# Patient Record
Sex: MALE | Race: WHITE | NOT HISPANIC OR LATINO | Employment: FULL TIME | URBAN - METROPOLITAN AREA
[De-identification: names, ages, dates, MRNs, and addresses within clinical notes are randomized per-mention and may not be internally consistent; named-entity substitution may affect disease eponyms.]

---

## 2017-01-03 ENCOUNTER — APPOINTMENT (OUTPATIENT)
Dept: PHYSICAL THERAPY | Facility: CLINIC | Age: 67
End: 2017-01-03
Payer: COMMERCIAL

## 2017-01-03 PROCEDURE — 97140 MANUAL THERAPY 1/> REGIONS: CPT

## 2017-01-03 PROCEDURE — 97110 THERAPEUTIC EXERCISES: CPT

## 2017-01-10 ENCOUNTER — APPOINTMENT (OUTPATIENT)
Dept: PHYSICAL THERAPY | Facility: CLINIC | Age: 67
End: 2017-01-10
Payer: COMMERCIAL

## 2017-01-10 PROCEDURE — 97110 THERAPEUTIC EXERCISES: CPT

## 2017-01-10 PROCEDURE — 97140 MANUAL THERAPY 1/> REGIONS: CPT

## 2017-01-12 ENCOUNTER — APPOINTMENT (OUTPATIENT)
Dept: PHYSICAL THERAPY | Facility: CLINIC | Age: 67
End: 2017-01-12
Payer: COMMERCIAL

## 2017-01-12 PROCEDURE — 97140 MANUAL THERAPY 1/> REGIONS: CPT

## 2017-01-12 PROCEDURE — 97110 THERAPEUTIC EXERCISES: CPT

## 2017-01-17 ENCOUNTER — APPOINTMENT (OUTPATIENT)
Dept: PHYSICAL THERAPY | Facility: CLINIC | Age: 67
End: 2017-01-17
Payer: COMMERCIAL

## 2017-01-19 ENCOUNTER — APPOINTMENT (OUTPATIENT)
Dept: PHYSICAL THERAPY | Facility: CLINIC | Age: 67
End: 2017-01-19
Payer: COMMERCIAL

## 2017-01-19 PROCEDURE — 97110 THERAPEUTIC EXERCISES: CPT

## 2017-01-19 PROCEDURE — 97140 MANUAL THERAPY 1/> REGIONS: CPT

## 2017-01-24 ENCOUNTER — APPOINTMENT (OUTPATIENT)
Dept: PHYSICAL THERAPY | Facility: CLINIC | Age: 67
End: 2017-01-24
Payer: COMMERCIAL

## 2017-01-26 ENCOUNTER — APPOINTMENT (OUTPATIENT)
Dept: PHYSICAL THERAPY | Facility: CLINIC | Age: 67
End: 2017-01-26
Payer: COMMERCIAL

## 2017-01-31 ENCOUNTER — APPOINTMENT (OUTPATIENT)
Dept: PHYSICAL THERAPY | Facility: CLINIC | Age: 67
End: 2017-01-31
Payer: COMMERCIAL

## 2017-01-31 ENCOUNTER — GENERIC CONVERSION - ENCOUNTER (OUTPATIENT)
Dept: OTHER | Facility: OTHER | Age: 67
End: 2017-01-31

## 2017-01-31 PROCEDURE — 97110 THERAPEUTIC EXERCISES: CPT

## 2017-01-31 PROCEDURE — 97140 MANUAL THERAPY 1/> REGIONS: CPT

## 2017-02-09 ENCOUNTER — APPOINTMENT (OUTPATIENT)
Dept: PHYSICAL THERAPY | Facility: CLINIC | Age: 67
End: 2017-02-09
Payer: COMMERCIAL

## 2017-02-14 ENCOUNTER — APPOINTMENT (OUTPATIENT)
Dept: PHYSICAL THERAPY | Facility: CLINIC | Age: 67
End: 2017-02-14
Payer: COMMERCIAL

## 2017-02-16 ENCOUNTER — APPOINTMENT (OUTPATIENT)
Dept: PHYSICAL THERAPY | Facility: CLINIC | Age: 67
End: 2017-02-16
Payer: COMMERCIAL

## 2017-02-16 PROCEDURE — 97110 THERAPEUTIC EXERCISES: CPT

## 2017-02-16 PROCEDURE — 97140 MANUAL THERAPY 1/> REGIONS: CPT

## 2017-02-21 ENCOUNTER — APPOINTMENT (OUTPATIENT)
Dept: PHYSICAL THERAPY | Facility: CLINIC | Age: 67
End: 2017-02-21
Payer: COMMERCIAL

## 2017-02-23 ENCOUNTER — APPOINTMENT (OUTPATIENT)
Dept: PHYSICAL THERAPY | Facility: CLINIC | Age: 67
End: 2017-02-23
Payer: COMMERCIAL

## 2017-02-23 PROCEDURE — 97110 THERAPEUTIC EXERCISES: CPT

## 2017-02-23 PROCEDURE — 97140 MANUAL THERAPY 1/> REGIONS: CPT

## 2017-02-28 ENCOUNTER — APPOINTMENT (OUTPATIENT)
Dept: PHYSICAL THERAPY | Facility: CLINIC | Age: 67
End: 2017-02-28
Payer: COMMERCIAL

## 2017-02-28 PROCEDURE — 97110 THERAPEUTIC EXERCISES: CPT

## 2017-02-28 PROCEDURE — 97140 MANUAL THERAPY 1/> REGIONS: CPT

## 2017-03-23 ENCOUNTER — ALLSCRIPTS OFFICE VISIT (OUTPATIENT)
Dept: OTHER | Facility: OTHER | Age: 67
End: 2017-03-23

## 2017-03-23 DIAGNOSIS — M75.21 BICIPITAL TENDINITIS OF RIGHT SHOULDER: ICD-10-CM

## 2017-03-28 ENCOUNTER — TRANSCRIBE ORDERS (OUTPATIENT)
Dept: MRI IMAGING | Facility: CLINIC | Age: 67
End: 2017-03-28

## 2017-04-05 ENCOUNTER — HOSPITAL ENCOUNTER (OUTPATIENT)
Dept: MRI IMAGING | Facility: CLINIC | Age: 67
Discharge: HOME | End: 2017-04-05
Payer: COMMERCIAL

## 2017-04-05 DIAGNOSIS — M75.21 BICIPITAL TENDINITIS OF RIGHT SHOULDER: ICD-10-CM

## 2017-04-05 PROCEDURE — 73221 MRI JOINT UPR EXTREM W/O DYE: CPT

## 2017-04-20 ENCOUNTER — ALLSCRIPTS OFFICE VISIT (OUTPATIENT)
Dept: OTHER | Facility: OTHER | Age: 67
End: 2017-04-20

## 2017-04-27 ENCOUNTER — APPOINTMENT (OUTPATIENT)
Dept: PHYSICAL THERAPY | Facility: CLINIC | Age: 67
End: 2017-04-27
Payer: COMMERCIAL

## 2017-04-27 ENCOUNTER — GENERIC CONVERSION - ENCOUNTER (OUTPATIENT)
Dept: OTHER | Facility: OTHER | Age: 67
End: 2017-04-27

## 2017-04-27 PROCEDURE — 97161 PT EVAL LOW COMPLEX 20 MIN: CPT

## 2017-04-27 PROCEDURE — 97110 THERAPEUTIC EXERCISES: CPT

## 2017-05-04 ENCOUNTER — APPOINTMENT (OUTPATIENT)
Dept: PHYSICAL THERAPY | Facility: CLINIC | Age: 67
End: 2017-05-04
Payer: COMMERCIAL

## 2017-05-04 PROCEDURE — 97110 THERAPEUTIC EXERCISES: CPT

## 2017-05-04 PROCEDURE — 97140 MANUAL THERAPY 1/> REGIONS: CPT

## 2017-05-11 ENCOUNTER — APPOINTMENT (OUTPATIENT)
Dept: PHYSICAL THERAPY | Facility: CLINIC | Age: 67
End: 2017-05-11
Payer: COMMERCIAL

## 2017-05-18 ENCOUNTER — APPOINTMENT (OUTPATIENT)
Dept: PHYSICAL THERAPY | Facility: CLINIC | Age: 67
End: 2017-05-18
Payer: COMMERCIAL

## 2017-05-18 PROCEDURE — 97140 MANUAL THERAPY 1/> REGIONS: CPT

## 2017-05-18 PROCEDURE — 97110 THERAPEUTIC EXERCISES: CPT

## 2017-05-25 ENCOUNTER — APPOINTMENT (OUTPATIENT)
Dept: PHYSICAL THERAPY | Facility: CLINIC | Age: 67
End: 2017-05-25
Payer: COMMERCIAL

## 2017-07-10 ENCOUNTER — ALLSCRIPTS OFFICE VISIT (OUTPATIENT)
Dept: OTHER | Facility: OTHER | Age: 67
End: 2017-07-10

## 2017-10-23 ENCOUNTER — ALLSCRIPTS OFFICE VISIT (OUTPATIENT)
Dept: OTHER | Facility: OTHER | Age: 67
End: 2017-10-23
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
## 2018-01-12 VITALS
DIASTOLIC BLOOD PRESSURE: 90 MMHG | HEIGHT: 73 IN | SYSTOLIC BLOOD PRESSURE: 151 MMHG | HEART RATE: 72 BPM | BODY MASS INDEX: 24.93 KG/M2 | WEIGHT: 188.13 LBS

## 2018-01-12 VITALS
BODY MASS INDEX: 24.92 KG/M2 | WEIGHT: 188 LBS | SYSTOLIC BLOOD PRESSURE: 167 MMHG | DIASTOLIC BLOOD PRESSURE: 64 MMHG | HEIGHT: 73 IN | HEART RATE: 82 BPM

## 2018-01-13 VITALS
BODY MASS INDEX: 25.05 KG/M2 | HEIGHT: 73 IN | HEART RATE: 69 BPM | RESPIRATION RATE: 14 BRPM | DIASTOLIC BLOOD PRESSURE: 80 MMHG | OXYGEN SATURATION: 97 % | SYSTOLIC BLOOD PRESSURE: 124 MMHG | WEIGHT: 189 LBS

## 2018-01-14 VITALS
OXYGEN SATURATION: 98 % | HEIGHT: 73 IN | WEIGHT: 189.13 LBS | HEART RATE: 85 BPM | DIASTOLIC BLOOD PRESSURE: 82 MMHG | RESPIRATION RATE: 15 BRPM | SYSTOLIC BLOOD PRESSURE: 130 MMHG | BODY MASS INDEX: 25.07 KG/M2

## 2018-01-17 NOTE — RESULT NOTES
Verified Results  * MRI SHOULDER RIGHT WO CONTRAST [id number] 07:22PM Markie Holloway Order Number: [id number]    - Patient Instructions: To schedule this appointment, please contact Central Scheduling at [phone]   Order Number: [id number]    - Patient Instructions: To schedule this appointment, please contact Central Scheduling at [phone]  Test Name Result Flag Reference   MRI SHOULDER RIGHT WO CONTRAST (Report)     MRI RIGHT SHOULDER     INDICATION: Extreme right shoulder pain and decreased range of motion  Prior trauma  COMPARISON: None  TECHNIQUE:  The following MR sequences were obtained of the right shoulder: Localizer, axial GRE/PD fat sat, oblique coronal T2 fat sat, oblique sagittal T1/T2 fat sat  Images were acquired on a 1 5 Lurdes unit  Gadolinium was not used  FINDINGS:     SUBCUTANEOUS TISSUES: Normal     JOINT EFFUSION: Moderate effusion  ACROMION PROCESS: Broad-based subacromial spur evident  ROTATOR CUFF: Subscapularis, supraspinatus and infraspinatus tendinosis with a full-thickness complete supraspinatus tendon tear with retraction to the level of the acromion process measuring up to 2 4 cm  Slightly diminutive appearance of the    supraspinatus muscle without fatty muscular infiltration indicate chronicity  SUBACROMIAL/SUBDELTOID BURSA: Normal       LONG HEAD OF BICEPS TENDON: Proximal biceps tendinosis without tear  GLENOID LABRUM: Intact  GLENOHUMERAL JOINT: Intact  ACROMIOCLAVICULAR JOINT: Mild degenerative change noted  BONE MARROW SIGNAL: Normal        IMPRESSION:   1  Prominent subacromial spur with subscapularis, supraspinatus and infraspinatus tendinosis including a full-thickness complete supraspinatus tendon tear exhibiting tendon retraction to the level of the acromion process  No findings to indicate    chronicity at this time  2  Mild proximal biceps tendinosis without tear  3  Intact glenoid labrum  Workstation performed: [id number]     Signed by:   Candelario Correa MD   9/6/16       Plan  Chronic right shoulder pain    · 1 - Durga CHEN, Daniela Nicholas (Orthopedic Surgery) Physician Referral  Consult  Status: Active   Requested for: [id number]  Care Summary provided  : Yes

## 2018-02-05 ENCOUNTER — TELEPHONE (OUTPATIENT)
Dept: FAMILY MEDICINE CLINIC | Facility: CLINIC | Age: 68
End: 2018-02-05

## 2018-02-05 ENCOUNTER — HOSPITAL ENCOUNTER (OUTPATIENT)
Dept: RADIOLOGY | Facility: HOSPITAL | Age: 68
Discharge: HOME | End: 2018-02-05
Attending: FAMILY MEDICINE
Payer: COMMERCIAL

## 2018-02-05 ENCOUNTER — OFFICE VISIT (OUTPATIENT)
Dept: FAMILY MEDICINE CLINIC | Facility: CLINIC | Age: 68
End: 2018-02-05
Payer: COMMERCIAL

## 2018-02-05 VITALS
SYSTOLIC BLOOD PRESSURE: 142 MMHG | HEART RATE: 77 BPM | OXYGEN SATURATION: 98 % | DIASTOLIC BLOOD PRESSURE: 78 MMHG | BODY MASS INDEX: 25.45 KG/M2 | WEIGHT: 192 LBS | HEIGHT: 73 IN

## 2018-02-05 DIAGNOSIS — S20.211A CONTUSION OF RIGHT CHEST WALL, INITIAL ENCOUNTER: Primary | ICD-10-CM

## 2018-02-05 PROCEDURE — 3008F BODY MASS INDEX DOCD: CPT | Performed by: FAMILY MEDICINE

## 2018-02-05 PROCEDURE — 1160F RVW MEDS BY RX/DR IN RCRD: CPT | Performed by: FAMILY MEDICINE

## 2018-02-05 PROCEDURE — 71101 X-RAY EXAM UNILAT RIBS/CHEST: CPT

## 2018-02-05 PROCEDURE — 99213 OFFICE O/P EST LOW 20 MIN: CPT | Performed by: FAMILY MEDICINE

## 2018-02-05 RX ORDER — MELOXICAM 15 MG/1
1 TABLET ORAL DAILY
COMMUNITY
Start: 2016-09-14 | End: 2018-04-06 | Stop reason: SDUPTHER

## 2018-02-05 RX ORDER — PROMETHAZINE HYDROCHLORIDE 25 MG/1
25 TABLET ORAL EVERY 6 HOURS PRN
Qty: 30 TABLET | Refills: 0 | Status: SHIPPED | OUTPATIENT
Start: 2018-02-05 | End: 2019-01-03

## 2018-02-05 RX ORDER — OXYCODONE HYDROCHLORIDE AND ACETAMINOPHEN 5; 325 MG/1; MG/1
1-2 TABLET ORAL EVERY 4 HOURS PRN
Qty: 50 TABLET | Refills: 0 | Status: SHIPPED | OUTPATIENT
Start: 2018-02-05 | End: 2018-03-01 | Stop reason: SDUPTHER

## 2018-02-05 NOTE — PROGRESS NOTES
Assessment/Plan:           Problem List Items Addressed This Visit     Contusion of right chest wall - Primary    Relevant Medications    oxyCODONE-acetaminophen (PERCOCET) 5-325 mg per tablet    promethazine (PHENERGAN) 25 mg tablet    Other Relevant Orders    XR ribs 2 vw right            Subjective:      Patient ID: Jodene Severance  is a 78 y o  male  Patient comes in 2 days after falling in the snow with his  falling on top of him injuring his right chest         The following portions of the patient's history were reviewed and updated as appropriate: allergies, current medications, past family history, past medical history, past social history, past surgical history and problem list     Review of Systems   Constitutional: Negative  HENT: Negative  Respiratory: Positive for shortness of breath  Cardiovascular: Positive for chest pain  Objective:     Physical Exam   Constitutional: He is oriented to person, place, and time  He appears well-developed and well-nourished  HENT:   Head: Normocephalic and atraumatic  Eyes: EOM are normal  Pupils are equal, round, and reactive to light  Neck: Neck supple  Cardiovascular: Normal rate, regular rhythm and normal heart sounds  Tenderness right chest wall   Pulmonary/Chest: Effort normal and breath sounds normal  Tenderness:   Right chest wall  Neurological: He is oriented to person, place, and time  Psychiatric: He has a normal mood and affect  Nursing note and vitals reviewed

## 2018-02-06 ENCOUNTER — TELEPHONE (OUTPATIENT)
Dept: FAMILY MEDICINE CLINIC | Facility: CLINIC | Age: 68
End: 2018-02-06

## 2018-02-06 DIAGNOSIS — S20.211A CONTUSION OF RIGHT CHEST WALL, INITIAL ENCOUNTER: Primary | ICD-10-CM

## 2018-02-06 RX ORDER — IBUPROFEN 800 MG/1
800 TABLET ORAL EVERY 8 HOURS PRN
Qty: 90 TABLET | Refills: 0 | Status: SHIPPED | OUTPATIENT
Start: 2018-02-06 | End: 2018-03-19 | Stop reason: SDUPTHER

## 2018-02-09 ENCOUNTER — TELEPHONE (OUTPATIENT)
Dept: FAMILY MEDICINE CLINIC | Facility: CLINIC | Age: 68
End: 2018-02-09

## 2018-03-01 DIAGNOSIS — S20.211A CONTUSION OF RIGHT CHEST WALL, INITIAL ENCOUNTER: ICD-10-CM

## 2018-03-01 RX ORDER — OXYCODONE HYDROCHLORIDE AND ACETAMINOPHEN 5; 325 MG/1; MG/1
1-2 TABLET ORAL EVERY 4 HOURS PRN
Qty: 50 TABLET | Refills: 0 | Status: SHIPPED | OUTPATIENT
Start: 2018-03-01 | End: 2019-01-11

## 2018-03-19 DIAGNOSIS — S20.211A CONTUSION OF RIGHT CHEST WALL, INITIAL ENCOUNTER: ICD-10-CM

## 2018-03-19 RX ORDER — IBUPROFEN 800 MG/1
800 TABLET ORAL EVERY 8 HOURS PRN
Qty: 90 TABLET | Refills: 0 | Status: SHIPPED | OUTPATIENT
Start: 2018-03-19 | End: 2019-03-19 | Stop reason: ALTCHOICE

## 2018-04-06 DIAGNOSIS — M25.50 ARTHRALGIA, UNSPECIFIED JOINT: Primary | ICD-10-CM

## 2018-04-06 RX ORDER — MELOXICAM 15 MG/1
15 TABLET ORAL DAILY
Qty: 30 TABLET | Refills: 3 | Status: SHIPPED | OUTPATIENT
Start: 2018-04-06 | End: 2018-08-08 | Stop reason: SDUPTHER

## 2018-04-16 ENCOUNTER — TELEPHONE (OUTPATIENT)
Dept: FAMILY MEDICINE CLINIC | Facility: CLINIC | Age: 68
End: 2018-04-16

## 2018-04-16 DIAGNOSIS — G89.29 CHRONIC HIP PAIN, UNSPECIFIED LATERALITY: Primary | ICD-10-CM

## 2018-04-16 DIAGNOSIS — M25.559 CHRONIC HIP PAIN, UNSPECIFIED LATERALITY: Primary | ICD-10-CM

## 2018-04-16 RX ORDER — METHYLPREDNISOLONE 4 MG/1
TABLET ORAL
Qty: 21 TABLET | Refills: 0 | Status: SHIPPED | OUTPATIENT
Start: 2018-04-16 | End: 2019-01-03

## 2018-04-16 NOTE — TELEPHONE ENCOUNTER
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

## 2018-08-08 DIAGNOSIS — M25.50 ARTHRALGIA, UNSPECIFIED JOINT: ICD-10-CM

## 2018-08-09 RX ORDER — MELOXICAM 15 MG/1
15 TABLET ORAL DAILY
Qty: 30 TABLET | Refills: 3 | Status: SHIPPED | OUTPATIENT
Start: 2018-08-09 | End: 2018-10-23 | Stop reason: SDUPTHER

## 2018-10-23 DIAGNOSIS — M25.50 ARTHRALGIA, UNSPECIFIED JOINT: ICD-10-CM

## 2018-10-23 RX ORDER — MELOXICAM 15 MG/1
15 TABLET ORAL DAILY
Qty: 30 TABLET | Refills: 0 | Status: SHIPPED | OUTPATIENT
Start: 2018-10-23 | End: 2018-11-19 | Stop reason: SDUPTHER

## 2018-11-19 DIAGNOSIS — M25.50 ARTHRALGIA, UNSPECIFIED JOINT: ICD-10-CM

## 2018-11-19 RX ORDER — MELOXICAM 15 MG/1
15 TABLET ORAL DAILY
Qty: 90 TABLET | Refills: 0 | Status: SHIPPED | OUTPATIENT
Start: 2018-11-19 | End: 2019-02-21 | Stop reason: SDUPTHER

## 2019-01-03 ENCOUNTER — OFFICE VISIT (OUTPATIENT)
Dept: FAMILY MEDICINE CLINIC | Facility: CLINIC | Age: 69
End: 2019-01-03
Payer: COMMERCIAL

## 2019-01-03 VITALS
WEIGHT: 184 LBS | DIASTOLIC BLOOD PRESSURE: 70 MMHG | HEART RATE: 71 BPM | SYSTOLIC BLOOD PRESSURE: 136 MMHG | HEIGHT: 73 IN | BODY MASS INDEX: 24.39 KG/M2 | TEMPERATURE: 98.3 F | OXYGEN SATURATION: 94 % | RESPIRATION RATE: 17 BRPM

## 2019-01-03 DIAGNOSIS — L98.9 SKIN LESION: Primary | ICD-10-CM

## 2019-01-03 PROCEDURE — 99213 OFFICE O/P EST LOW 20 MIN: CPT | Performed by: NURSE PRACTITIONER

## 2019-01-03 RX ORDER — CYCLOBENZAPRINE HCL 10 MG
10 TABLET ORAL
Refills: 1 | COMMUNITY
Start: 2018-12-31 | End: 2019-03-19 | Stop reason: ALTCHOICE

## 2019-01-03 NOTE — ASSESSMENT & PLAN NOTE
Provided referral to dermatologist   Appointment made with [facility] for January 8th  Patient made aware of appointment time and location

## 2019-01-03 NOTE — PROGRESS NOTES
Assessment/Plan:    Skin lesion  Provided referral to dermatologist   Appointment made with [facility] for January 8th  Patient made aware of appointment time and location  Diagnoses and all orders for this visit:    Skin lesion  -     Ambulatory referral to Dermatology; Future    Other orders  -     cyclobenzaprine (FLEXERIL) 10 mg tablet; Take 10 mg by mouth daily at bedtime          Subjective:      Patient ID: Lor Araujo  is a 80 y o  male  [facility] reporting a nonhealing lesion on his nose for the past 2 months  He reports it initially was pearly in appearance  He applied Neosporin and tried to pop it  Since this time, there has been a scab formation which will fall off in bleed, but never fully heal   Denies family history of skin cancer or personal history of skin cancer  The following portions of the patient's history were reviewed and updated as appropriate: He   Patient Active Problem List    Diagnosis Date Noted    Skin lesion 01/03/2019    Contusion of right chest wall 02/05/2018     Current Outpatient Prescriptions   Medication Sig Dispense Refill    cyclobenzaprine (FLEXERIL) 10 mg tablet Take 10 mg by mouth daily at bedtime  1    ibuprofen (MOTRIN) 800 mg tablet TAKE 1 TABLET (800 MG TOTAL) BY MOUTH EVERY 8 (EIGHT) HOURS AS NEEDED FOR MILD PAIN OR MODERATE PAIN 90 tablet 0    meloxicam (MOBIC) 15 mg tablet Take 1 tablet (15 mg total) by mouth daily 90 tablet 0    oxyCODONE-acetaminophen (PERCOCET) 5-325 mg per tablet Take 1-2 tablets by mouth every 4 (four) hours as needed for moderate pain Earliest Fill Date: 3/1/18 Max Daily Amount: 12 tablets 50 tablet 0     No current facility-administered medications for this visit  He is allergic to tetracyclines & related and demerol [meperidine]       Review of Systems   Constitutional: Negative  Respiratory: Negative  Cardiovascular: Negative      Skin:        Skin lesion   Psychiatric/Behavioral: Negative  /70   Pulse 71   Temp 98 3 °F (36 8 °C)   Resp 17   Ht 6' 1" (1 854 m)   Wt 83 5 kg (184 lb)   SpO2 94%   BMI 24 28 kg/m²     Objective:     Physical Exam   Constitutional: He is oriented to person, place, and time  He appears well-developed and well-nourished  HENT:   Head: Normocephalic and atraumatic  Eyes: Conjunctivae are normal    Neck: Neck supple  Cardiovascular: Normal rate, regular rhythm and normal heart sounds  No murmur heard  Pulmonary/Chest: Effort normal and breath sounds normal  No respiratory distress  He has no wheezes  He has no rales  He exhibits no tenderness  Neurological: He is alert and oriented to person, place, and time  Skin:   Presence of a 4 mm rounded lesion with a pearly base located on the tip of nose  Presence of scabbing and dried blood surrounding lesion  Psychiatric: He has a normal mood and affect  His behavior is normal  Judgment and thought content normal    Nursing note and vitals reviewed

## 2019-01-11 VITALS
DIASTOLIC BLOOD PRESSURE: 76 MMHG | SYSTOLIC BLOOD PRESSURE: 165 MMHG | HEIGHT: 73 IN | WEIGHT: 176 LBS | HEART RATE: 94 BPM | BODY MASS INDEX: 23.33 KG/M2

## 2019-01-11 DIAGNOSIS — M75.121 COMPLETE TEAR OF RIGHT ROTATOR CUFF: Primary | ICD-10-CM

## 2019-01-11 PROCEDURE — 99214 OFFICE O/P EST MOD 30 MIN: CPT | Performed by: ORTHOPAEDIC SURGERY

## 2019-01-11 NOTE — PROGRESS NOTES
Patient Name:  Shamika Rae  MRN:  [id number]    Assessment     1  Complete tear of right rotator cuff  Ambulatory referral to Physical Therapy       Plan     1  Reviewed treatment options, including arthroscopic rotator cuff repair  Patient wants to try a course of nonsurgical treatment, which worked well for him in the past   We discussed the potential that his tear is irreparable based on the degree of retraction and chronicity  Wants to reserve surgery as a last resort  2  Unable to work until next evaluation  3  Referred to physical therapy  4  NSAIDs as needed  Avoid opiate medication  Return in about 1 month (around 2/11/2019)  Chief Complaint     Right shoulder pain      History of the Present Illness     Elenita Gonzales Sr  is a 80 y o  male with right shoulder pain after an injury at work on 12/31/18  He states that he slipped and fell onto his right shoulder  He had significant pain and weakness after the injury with inability to actively raise his arm away from his body  He reports moderate improvement since the injury occurred but continues to have pain and weakness with reaching overhead localized to the anterolateral aspect of his shoulder  He initially had pain in the trapezius and neck region that has since resolved  He reports previous problem with his right shoulder approximately 2 years ago treated with physical therapy and 2 steroid injections  MRI in April 2017 showed a rotator cuff tear  A more recent MRI after his work injury also showed this rotator cuff tear  Physical Exam     /76   Pulse 94   Ht 6' 1" (1 854 m)   Wt 79 8 kg (176 lb)   BMI 23 22 kg/m²     Right shoulder:  Nontender to palpation  No gross deformity  Full range of motion  Impingement signs are positive  Empty can test is positive  Speed's test is positive  Cross-body adduction test is negative  4/5 strength forward flexion  Eyes:  Anicteric sclerae  Neck:  Supple    Lungs: Unlabored breathing  Cardiovascular:  Capillary refill is less than 2 seconds  Skin:  Intact without erythema  Neurologic:  Sensation intact to light touch  Psychiatric:  Mood and affect are appropriate  Data Review     I have personally reviewed pertinent films in PACS, and my interpretation follows:    MRI right shoulder 1/4/19:  Complete tear of the supraspinatus tendon with retraction to the humeral head apex unchanged from 4/5/17  Radiology report describes moderate supraspinatus muscle atrophy, which is less apparent on my read  Moderate acromioclavicular joint degenerative changes  Mild glenohumeral joint degenerative changes  History reviewed  No pertinent past medical history  Past Surgical History:   Procedure Laterality Date    WRIST SURGERY         Allergies   Allergen Reactions    Tetracyclines & Related     Demerol [Meperidine] Vomiting       Current Outpatient Prescriptions on File Prior to Visit   Medication Sig Dispense Refill    cyclobenzaprine (FLEXERIL) 10 mg tablet Take 10 mg by mouth daily at bedtime  1    ibuprofen (MOTRIN) 800 mg tablet TAKE 1 TABLET (800 MG TOTAL) BY MOUTH EVERY 8 (EIGHT) HOURS AS NEEDED FOR MILD PAIN OR MODERATE PAIN 90 tablet 0    meloxicam (MOBIC) 15 mg tablet Take 1 tablet (15 mg total) by mouth daily 90 tablet 0    [DISCONTINUED] oxyCODONE-acetaminophen (PERCOCET) 5-325 mg per tablet Take 1-2 tablets by mouth every 4 (four) hours as needed for moderate pain Earliest Fill Date: 3/1/18 Max Daily Amount: 12 tablets 50 tablet 0     No current facility-administered medications on file prior to visit  Social History   Substance Use Topics    Smoking status: Never Smoker    Smokeless tobacco: Never Used    Alcohol use Not on file      Comment: special occasions       Family History   Problem Relation Age of Onset    Diabetes Mother     Cancer Other          Review of Systems     As stated in the HPI   All other systems were reviewed and are negative

## 2019-01-11 NOTE — LETTER
January 11, 2019     Patient: Gemma Moore Sr  YOB: 1950   Date of Visit: 1/11/2019       To Whom it May Concern:    Urban Castellon is under my professional care  He was seen in my office on 1/11/2019  He is unable to work until the next office evaluation in 1 month  If you have any questions or concerns, please don't hesitate to call           Sincerely,          Bernabe Ramirez MD        CC: No Recipients

## 2019-01-16 ENCOUNTER — EVALUATION (OUTPATIENT)
Dept: PHYSICAL THERAPY | Facility: CLINIC | Age: 69
End: 2019-01-16
Payer: OTHER MISCELLANEOUS

## 2019-01-16 DIAGNOSIS — M75.121 COMPLETE TEAR OF RIGHT ROTATOR CUFF: ICD-10-CM

## 2019-01-16 PROCEDURE — 97161 PT EVAL LOW COMPLEX 20 MIN: CPT | Performed by: PHYSICAL THERAPIST

## 2019-01-16 PROCEDURE — 97110 THERAPEUTIC EXERCISES: CPT | Performed by: PHYSICAL THERAPIST

## 2019-01-16 NOTE — PROGRESS NOTES
PT Evaluation     Today's date: 2019  Patient name: Morgan Ge  : 1950  MRN: [id number]  Referring provider: Lottie Seymour MD  Dx:   Encounter Diagnosis     ICD-10-CM    1  Complete tear of right rotator cuff M75 121 Ambulatory referral to Physical Therapy                  Assessment  Assessment details: Ana Maria Oconnor Sr  is a 80 y o  male who presents with pain, decreased strength, and decreased ROM  Due to these impairments, patient has difficulty performing a/iadls, recreational activities, and is currently unable to work  Patient's clinical presentation is consistent with their referring diagnosis of right RTC tear  Patient would benefit from skilled physical therapy to address their aforementioned impairments, improve their level of function and to improve their overall quality of life  Impairments: abnormal or restricted ROM, impaired physical strength and pain with function    Symptom irritability: moderateUnderstanding of Dx/Px/POC: good   Prognosis: good    Goals  Short term goals  to be achieved in 4 weeks:    Decrease pain 20-50%  Increase strength by 1/2 grade  Improve range of motion by 25%  Long term goals  to be achieved by discharge:    Overhead reaching is improved to maximal level of function  Lifting is improved to maximal level of function  IADL performance in related activities is improved to maximal level of function  Performance in related work activities is improved to maximal level of function       Plan  Planned therapy interventions: manual therapy, neuromuscular re-education, patient education, strengthening, stretching, therapeutic exercise and home exercise program  Frequency: 3x week  Duration in visits: 18  Duration in weeks: 6  Plan of Care beginning date: 2019  Plan of Care expiration date: 2019  Treatment plan discussed with: patient        Subjective Evaluation    History of Present Illness  Date of onset: 2018  Mechanism of injury: Patient refers to PT with c/o pain in his right shoulder which began on 18 when he slipped on ice and fell outside of his truck while working  Patient sustained initial right RTC from [address] in 2017  Patient received an MRI of his right shoulder in 2017 which revealed complete tear of the Supraspinatus with torn tendon edge retracted to the level of the Humeral head apex and moderate AC joint OA  Patient states he received an MRI of his right shoulder on 19; patient unsure of specifics of test; report not available at this time  Patient works full time as a ; is currently out of work secondary to right shoulder injury  Patient states pain is decreasing and ROM is improving since the injury on   Pain  Current pain ratin  At best pain ratin  At worst pain ratin  Quality: sharp and dull ache  Relieving factors: heat and medications (BioFreeze)  Exacerbated by: Washing, dressing, reaching    Progression: improved          Objective     Active Range of Motion     Right Shoulder   Flexion: 90 degrees   Abduction: 90 degrees   External rotation BTH: T1   Internal rotation BTB: T11     Passive Range of Motion     Right Shoulder   Flexion: 150 degrees   Abduction: 145 degrees   External rotation 45°: 65 degrees   Internal rotation 45°: 75 degrees     Strength/Myotome Testing     Right Shoulder     Planes of Motion   Flexion: 3- (pain)   Abduction: 3- (pain)   External rotation at 0°: 4- (pain)   Internal rotation at 0°: 4+     Isolated Muscles   Lower trapezius: 4- (pain)   Middle trapezius: 4- (pain)       Flowsheet Rows      Most Recent Value   PT/OT G-Codes   Current Score  45   Projected Score  62   FOTO information reviewed  Yes   Assessment Type  Evaluation          Precautions: Right RTC tear, Right hip OA    Daily Treatment Diary     Manual              Right shoulder PROM Exercise Diary  1/16            Pulleys             TB ER             TB IR             TB shoulder ext             TB rows             Sup wand flex             Sup wand ER             Sidelying ER             Prone T's             Prone I's             Prone Y's                                                                                                                                      Modalities              CP prn

## 2019-01-18 ENCOUNTER — OFFICE VISIT (OUTPATIENT)
Dept: PHYSICAL THERAPY | Facility: CLINIC | Age: 69
End: 2019-01-18
Payer: OTHER MISCELLANEOUS

## 2019-01-18 DIAGNOSIS — M75.121 COMPLETE TEAR OF RIGHT ROTATOR CUFF: Primary | ICD-10-CM

## 2019-01-18 PROCEDURE — 97140 MANUAL THERAPY 1/> REGIONS: CPT | Performed by: PHYSICAL THERAPIST

## 2019-01-18 PROCEDURE — 97110 THERAPEUTIC EXERCISES: CPT | Performed by: PHYSICAL THERAPIST

## 2019-01-18 NOTE — PROGRESS NOTES
Daily Note      Today's date: 2019  Patient name: Delmi Royal  : 1950  MRN: [id number]  Referring provider: Isha Bueno MD  Dx:   Encounter Diagnosis     ICD-10-CM    1  Complete tear of right rotator cuff M75 121                   Subjective: Patient stated moderate difficulty with HEP activities  Objective: See treatment diary below  Precautions: Right RTC tear, Right hip OA     Daily Treatment Diary      Manual                     Right shoulder PROM    KK                                                                                                                         Exercise Diary                     Pulleys    5 min                   TB ER    GTB 25x                   TB IR    GTB                    TB shoulder ext    nv                   TB rows    GTB 30x                   Sup wand flex   10x10"                   Sup wand ER   10x10"                   Sidelying ER    2x10                   Prone T's                       Prone I's                       Prone Y's                                                                                                                                                                                                                                                     Modalities                        CP prn                                                                               Assessment: Patient demonstrated moderate difficulty with supine wand flex and sidelying shoulder ER; moderate pain with manual shoulder PROM  Plan: Continue per plan of care

## 2019-01-21 ENCOUNTER — OFFICE VISIT (OUTPATIENT)
Dept: PHYSICAL THERAPY | Facility: CLINIC | Age: 69
End: 2019-01-21
Payer: OTHER MISCELLANEOUS

## 2019-01-21 DIAGNOSIS — M75.121 COMPLETE TEAR OF RIGHT ROTATOR CUFF: Primary | ICD-10-CM

## 2019-01-21 PROCEDURE — 97140 MANUAL THERAPY 1/> REGIONS: CPT

## 2019-01-21 PROCEDURE — 97110 THERAPEUTIC EXERCISES: CPT

## 2019-01-21 PROCEDURE — 97112 NEUROMUSCULAR REEDUCATION: CPT

## 2019-01-21 NOTE — PROGRESS NOTES
Daily Note     Today's date: 2019  Patient name: Anna Banks  : 1950  MRN: [id number]  Referring provider: Author Clarisa MD  Dx:   Encounter Diagnosis     ICD-10-CM    1  Complete tear of right rotator cuff M75 121                   Subjective: Pt c/o 5/10 pain in R shoulder today  He states that his pain varies between 4-8  Objective: See treatment diary below  Precautions: Right RTC tear, Right hip OA     Daily Treatment Diary      Manual                   Right shoulder PROM    KK  SA                                                                                                                       Exercise Diary                   Pulleys    5 min  5'                 TB ER    GTB 25x  GTB 25x                 TB IR    GTB   GTB 25x                 TB shoulder ext    nv  GTB 30x                 TB rows    GTB 30x  GTB 30x                 Sup wand flex   10x10"  10" 10x                 Sup wand ER   10x10"  10" 10x                 Sidelying ER    2x10  2x10                 Prone T's      10x                 Prone I's      10x                 Prone Y's      10x                                                                                                                                                                                                                                               Modalities                        CP prn  10'                                                                           Assessment: Pt had little to no pain post treatment today despite noting some ms soreness  He was educated on DOMS due to increase in intensity  He noted that the ice helps to decrease his soreness  He was advised to continue with HEP including ROM to decrease tension in R shoulder  Plan: Progress treatment as tolerated

## 2019-01-23 ENCOUNTER — OFFICE VISIT (OUTPATIENT)
Dept: PHYSICAL THERAPY | Facility: CLINIC | Age: 69
End: 2019-01-23
Payer: OTHER MISCELLANEOUS

## 2019-01-23 DIAGNOSIS — M75.121 COMPLETE TEAR OF RIGHT ROTATOR CUFF: Primary | ICD-10-CM

## 2019-01-23 PROCEDURE — 97110 THERAPEUTIC EXERCISES: CPT

## 2019-01-23 PROCEDURE — 97140 MANUAL THERAPY 1/> REGIONS: CPT

## 2019-01-23 PROCEDURE — 97112 NEUROMUSCULAR REEDUCATION: CPT

## 2019-01-23 NOTE — PROGRESS NOTES
Daily Note     Today's date: 2019  Patient name: Julian Hopkins  : 1950  MRN: [id number]  Referring provider: Kimberli Flores MD  Dx:   Encounter Diagnosis     ICD-10-CM    1  Complete tear of right rotator cuff M75 121                   Subjective: Pt c/o 6/10 pain in his R shoulder today  He states that his pain comes and goes and the evening is the worst        Objective: See treatment diary below  Precautions: Right RTC tear, Right hip OA     Daily Treatment Diary      Manual                 Right shoulder PROM    KK  SA  SA                                                                                                                     Exercise Diary                 Pulleys    5 min  5'  5'               TB ER    GTB 25x  GTB 25x  GTB 25x               TB IR    GTB   GTB 25x  GTB 25x               TB shoulder ext    nv  GTB 30x  GTB 30x               TB rows    GTB 30x  GTB 30x  GTB 30x               Sup wand flex   10x10"  10" 10x  10" 10x               Sup wand ER   10x10"  10" 10x  10" 10x               Sidelying ER    2x10  2x10  2x10               Prone T's      10x  10x               Prone I's      10x  10x               Prone Y's      10x  10x                                                                                                                                                                                                                                             Modalities                      CP prn  10'  10'                                                                         Assessment: Pt had no pain post treatment today  He noted feeling better after manual  He was educated on HEP including prone Supriya to improve ROM and decrease pain  Plan: Progress treatment as tolerated

## 2019-01-25 ENCOUNTER — OFFICE VISIT (OUTPATIENT)
Dept: PHYSICAL THERAPY | Facility: CLINIC | Age: 69
End: 2019-01-25
Payer: OTHER MISCELLANEOUS

## 2019-01-25 DIAGNOSIS — M75.121 COMPLETE TEAR OF RIGHT ROTATOR CUFF: Primary | ICD-10-CM

## 2019-01-25 PROCEDURE — 97110 THERAPEUTIC EXERCISES: CPT | Performed by: PHYSICAL THERAPIST

## 2019-01-25 PROCEDURE — 97140 MANUAL THERAPY 1/> REGIONS: CPT | Performed by: PHYSICAL THERAPIST

## 2019-01-25 PROCEDURE — 97112 NEUROMUSCULAR REEDUCATION: CPT | Performed by: PHYSICAL THERAPIST

## 2019-01-25 PROCEDURE — 97010 HOT OR COLD PACKS THERAPY: CPT | Performed by: PHYSICAL THERAPIST

## 2019-01-25 NOTE — PROGRESS NOTES
Daily Note     Today's date: 2019  Patient name: Mary Sadler  : 1950  MRN: [id number]  Referring provider: Edmar Wynn MD  Dx:   Encounter Diagnosis     ICD-10-CM    1  Complete tear of right rotator cuff M75 121                   Subjective: Patient states he was not able to complete his HEP yesterday secondary to increased pain in his shoulder  Objective: See treatment diary below  Precautions: Right RTC tear, Right hip OA     Daily Treatment Diary      Manual               Right shoulder PROM    KK  SA  SA  KK                                                                                                                   Exercise Diary               Pulleys    5 min  5'  5'  5 min             TB ER    GTB 25x  GTB 25x  GTB 25x  GTB 30x             TB IR    GTB   GTB 25x  GTB 25x  GTB 30x             TB shoulder ext    nv  GTB 30x  GTB 30x  GTB 30x             TB rows    GTB 30x  GTB 30x  GTB 30x  GTB 30x             Sup wand flex   10x10"  10" 10x  10" 10x  10" 10x            Sup wand ER   10x10"  10" 10x  10" 10x  10" 10x             Sidelying ER    2x10  2x10  2x10  2x10             Prone T's      10x  10x  2x10             Prone I's      10x  10x  2x10             Prone Y's      10x  10x  2x10                                                                                                                                                                                                                                           Modalities                    CP prn  10'  10'  10'                                                                         Assessment: Patient demonstrated greatest difficulty with sidelying ER and supine wand flexion; minimal pain with manual shoulder PROM  Plan: Continue per plan of care

## 2019-01-28 ENCOUNTER — OFFICE VISIT (OUTPATIENT)
Dept: PHYSICAL THERAPY | Facility: CLINIC | Age: 69
End: 2019-01-28
Payer: OTHER MISCELLANEOUS

## 2019-01-28 DIAGNOSIS — M75.121 COMPLETE TEAR OF RIGHT ROTATOR CUFF: Primary | ICD-10-CM

## 2019-01-28 PROCEDURE — 97140 MANUAL THERAPY 1/> REGIONS: CPT | Performed by: PHYSICAL THERAPIST

## 2019-01-28 PROCEDURE — 97110 THERAPEUTIC EXERCISES: CPT | Performed by: PHYSICAL THERAPIST

## 2019-01-28 NOTE — PROGRESS NOTES
Daily Note     Today's date: 2019  Patient name: Francis Suarez  : 1950  MRN: [id number]  Referring provider: Lynn Escalera MD  Dx:   Encounter Diagnosis     ICD-10-CM    1  Complete tear of right rotator cuff M75 121                   Subjective: Patient stated moderate stiffness in his shoulder prior to treatment session        Objective: See treatment diary below  Precautions: Right RTC tear, Right hip OA     Daily Treatment Diary      Manual             Right shoulder PROM    KK  SA  SA  KK  KK                                                                                                                 Exercise Diary             Pulleys    5 min  5'  5'  5 min  5 min           TB ER    GTB 25x  GTB 25x  GTB 25x  GTB 30x  GTB 30x           TB IR    GTB   GTB 25x  GTB 25x  GTB 30x  GTB 30x           TB shoulder ext    nv  GTB 30x  GTB 30x  GTB 30x  GTB 30x           TB rows    GTB 30x  GTB 30x  GTB 30x  GTB 30x  GTB 30x           Sup wand flex   10x10"  10" 10x  10" 10x  10" 10x 10" 10x           Sup wand ER   10x10"  10" 10x  10" 10x  10" 10x  10" 10x           Sidelying ER    2x10  2x10  2x10  2x10  3x10           Prone T's      10x  10x  2x10  3x10           Prone I's      10x  10x  2x10  3x10           Prone Y's      10x  10x  2x10  3x10           Stand shld flex            2x10                                                                                                                                                                                                                 Modalities                    CP prn  10'  10'  10'                                                                         Assessment: Patient required verbal cueing to avoid UT compensation during standing shoulder flexion; patient performed increased reps with exercises as listed without significant c/o pain; moderate pain with manual shoulder PROM  Plan: Continue per plan of care

## 2019-01-30 ENCOUNTER — APPOINTMENT (OUTPATIENT)
Dept: PHYSICAL THERAPY | Facility: CLINIC | Age: 69
End: 2019-01-30
Payer: OTHER MISCELLANEOUS

## 2019-02-01 ENCOUNTER — OFFICE VISIT (OUTPATIENT)
Dept: PHYSICAL THERAPY | Facility: CLINIC | Age: 69
End: 2019-02-01
Payer: OTHER MISCELLANEOUS

## 2019-02-01 DIAGNOSIS — M75.121 COMPLETE TEAR OF RIGHT ROTATOR CUFF: Primary | ICD-10-CM

## 2019-02-01 PROCEDURE — 97140 MANUAL THERAPY 1/> REGIONS: CPT | Performed by: PHYSICAL THERAPIST

## 2019-02-01 PROCEDURE — 97010 HOT OR COLD PACKS THERAPY: CPT | Performed by: PHYSICAL THERAPIST

## 2019-02-01 PROCEDURE — 97112 NEUROMUSCULAR REEDUCATION: CPT | Performed by: PHYSICAL THERAPIST

## 2019-02-01 PROCEDURE — 97110 THERAPEUTIC EXERCISES: CPT | Performed by: PHYSICAL THERAPIST

## 2019-02-01 NOTE — PROGRESS NOTES
Daily Note     Today's date: 2019  Patient name: Sukh Kearns  : 1950  MRN: [id number]  Referring provider: Antonio Steen MD  Dx:   Encounter Diagnosis     ICD-10-CM    1  Complete tear of right rotator cuff M75 121                   Subjective: Patient stated moderate stiffness in his shoulder prior to treatment session        Objective: See treatment diary below  Precautions: Right RTC tear, Right hip OA     Daily Treatment Diary      Manual           Right shoulder PROM    KK  SA  SA  KK  KK  KK                                                                                                               Exercise Diary           Pulleys    5 min  5'  5'  5 min  5 min  5 min         TB ER    GTB 25x  GTB 25x  GTB 25x  GTB 30x  GTB 30x  BTB 30x         TB IR    GTB   GTB 25x  GTB 25x  GTB 30x  GTB 30x  BTB 30x         TB shoulder ext    nv  GTB 30x  GTB 30x  GTB 30x  GTB 30x  BTB 30x         TB rows    GTB 30x  GTB 30x  GTB 30x  GTB 30x  GTB 30x  BTB 30x        Sup wand flex   10x10"  10" 10x  10" 10x  10" 10x 10" 10x  10" 10x          Sup wand ER   10x10"  10" 10x  10" 10x  10" 10x  10" 10x  10" 10x          Sidelying ER    2x10  2x10  2x10  2x10  3x10  2x10        Prone T's      10x  10x  2x10  3x10  3x10         Prone I's      10x  10x  2x10  3x10  3x10         Prone Y's      10x  10x  2x10  3x10  3x10         Stand shld flex            2x10  2x10                                                                                                                                                                                                               Modalities                    CP prn  10'  10'  10'                                                                         Assessment:  Patient demonstrated moderate difficulty with progression to BTB with TB ER exercise; patient continues to demonstrate moderate difficulty with sidelying ER; moderate pain with manual PROM  Plan: Continue per plan of care

## 2019-02-04 ENCOUNTER — EVALUATION (OUTPATIENT)
Dept: PHYSICAL THERAPY | Facility: CLINIC | Age: 69
End: 2019-02-04
Payer: OTHER MISCELLANEOUS

## 2019-02-04 DIAGNOSIS — M75.121 COMPLETE TEAR OF RIGHT ROTATOR CUFF: Primary | ICD-10-CM

## 2019-02-04 PROCEDURE — 97140 MANUAL THERAPY 1/> REGIONS: CPT | Performed by: PHYSICAL THERAPIST

## 2019-02-04 PROCEDURE — 97112 NEUROMUSCULAR REEDUCATION: CPT | Performed by: PHYSICAL THERAPIST

## 2019-02-04 PROCEDURE — 97010 HOT OR COLD PACKS THERAPY: CPT | Performed by: PHYSICAL THERAPIST

## 2019-02-04 PROCEDURE — 97110 THERAPEUTIC EXERCISES: CPT | Performed by: PHYSICAL THERAPIST

## 2019-02-04 NOTE — PROGRESS NOTES
PT Re-Evaluation     Today's date: 2019  Patient name: Merlene Chavarria  : 1950  MRN: [id number]  Referring provider: Vern Ramirez MD  Dx:   Encounter Diagnosis     ICD-10-CM    1  Complete tear of right rotator cuff M75 121                   Assessment  Assessment details: Patient demonstrates improved ROM, improved strength, and improved functional ability with his right UE since beginning PT treatment  Patient would benefit from continued skilled PT services to address remaining deficits and to facilitate return to prior level of function  Thank you for the referral of your patient  Impairments: abnormal or restricted ROM, impaired physical strength and pain with function  Understanding of Dx/Px/POC: good   Prognosis: good    Goals  Short term goals - to be achieved in 4 weeks:    Decrease pain 20-50% - not met   Increase strength by 1/2 grade - met   Improve range of motion by 25% - partially met  Long term goals - to be achieved by discharge:    Overhead reaching is improved to maximal level of function - partially met   Lifting is improved to maximal level of function - partially met    IADL performance in related activities is improved to maximal level of function - partially met    Performance in related work activities is improved to maximal level of function - not met (Patient out of work)    Plan  Planned therapy interventions: manual therapy, neuromuscular re-education, patient education, stretching, strengthening, therapeutic activities, therapeutic exercise and home exercise program  Frequency: 2-3 times per week  Duration in visits: 18  Duration in weeks: 6  Plan of Care beginning date: 2019  Plan of Care expiration date: 3/18/2019  Treatment plan discussed with: patient        Subjective Evaluation    History of Present Illness  Mechanism of injury: Patient states he feels that his shoulder is approximately 50-60% improved since beginning PT treatment    Patient states continued pain with reaching overhead  Patient states some improvement with performance of ADL's and IADL's; but has continued pain and difficulty with functional activities      Pain  Current pain ratin  At best pain ratin  At worst pain ratin          Objective     General Comments:      Shoulder Comments   Active Range of Motion     Right Shoulder   Flexion: 125 degrees   Abduction: 125 degrees   External rotation BTH: T1   Internal rotation BTB: T9     Passive Range of Motion      Right Shoulder   Flexion: 155 degrees   Abduction: 150 degrees   External rotation 45°: 65 degrees   Internal rotation 45°: 75 degrees      Strength/Myotome Testing     Right Shoulder      Planes of Motion   Flexion: 3+ (pain)   Abduction: 3+ (pain)   External rotation at 0°: 4 (pain)   Internal rotation at 0°: 5      Isolated Muscles   Lower trapezius: 4 (pain)   Middle trapezius: 4 (pain)       Flowsheet Rows      Most Recent Value   PT/OT G-Codes   Current Score  48   Projected Score  62   FOTO information reviewed  Yes   Assessment Type  Re-evaluation       Precautions: Right RTC tear, Right hip OA     Daily Treatment Diary      Manual    2  2/4       Right shoulder PROM    KK  SA  SA  KK  KK  KK  KK                               Re-eval                KK                                                             Exercise Diary    2/1  2/4       Pulleys    5 min  5'  5'  5 min  5 min  5 min  np       TB ER    GTB 25x  GTB 25x  GTB 25x  GTB 30x  GTB 30x  BTB 30x  BTB 30x       TB IR    GTB   GTB 25x  GTB 25x  GTB 30x  GTB 30x  BTB 30x  BTB 30x       TB shoulder ext    nv  GTB 30x  GTB 30x  GTB 30x  GTB 30x  BTB 30x  BTB 30x       TB rows    GTB 30x  GTB 30x  GTB 30x  GTB 30x  GTB 30x  BTB 30x  BTB 30x       Sup wand flex   10x10"  10" 10x  10" 10x  10" 10x 10" 10x  10" 10x   np       Sup wand ER   10x10"  10" 10x  10" 10x  10" 10x  10" 10x  10" 10x  np       Sidelying ER    2x10  2x10  2x10  2x10  3x10  2x10  np       Prone T's      10x  10x  2x10  3x10  3x10  np       Prone I's      10x  10x  2x10  3x10  3x10  np       Prone Y's      10x  10x  2x10  3x10  3x10  np       Stand shld flex            2x10  2x10  np       UBE                4'f/4'b lvl 4       Doorway ER str                 4x20"                                                                                                                                                             Modalities   1/21 1/23 1/25  2/4               CP prn  10'  10'  10'  10'

## 2019-02-06 ENCOUNTER — OFFICE VISIT (OUTPATIENT)
Dept: PHYSICAL THERAPY | Facility: CLINIC | Age: 69
End: 2019-02-06
Payer: OTHER MISCELLANEOUS

## 2019-02-06 DIAGNOSIS — M75.121 COMPLETE TEAR OF RIGHT ROTATOR CUFF: Primary | ICD-10-CM

## 2019-02-06 PROCEDURE — 97140 MANUAL THERAPY 1/> REGIONS: CPT

## 2019-02-06 PROCEDURE — 97112 NEUROMUSCULAR REEDUCATION: CPT

## 2019-02-06 PROCEDURE — 97110 THERAPEUTIC EXERCISES: CPT

## 2019-02-06 NOTE — PROGRESS NOTES
Daily Note     Today's date: 2019  Patient name: Morgan Ge  : 1950  MRN: [id number]  Referring provider: Lottie Seymour MD  Dx:   Encounter Diagnosis     ICD-10-CM    1  Complete tear of right rotator cuff M75 121                   Subjective: Pt c/o 4/10 pain in R shoulder today  He states that he over did it yesterday         Objective: See treatment diary below   Precautions: Right RTC tear, Right hip OA     Daily Treatment Diary      Manual    2/  2/6     Right shoulder PROM    KK  SA  SA  KK  KK  KK  KK  SA                             Re-eval                KK                                                             Exercise Diary    2  2/6     Pulleys    5 min  5'  5'  5 min  5 min  5 min  np  5'     TB ER    GTB 25x  GTB 25x  GTB 25x  GTB 30x  GTB 30x  BTB 30x  BTB 30x  BTB 30x     TB IR    GTB   GTB 25x  GTB 25x  GTB 30x  GTB 30x  BTB 30x  BTB 30x  BTB 30x     TB shoulder ext    nv  GTB 30x  GTB 30x  GTB 30x  GTB 30x  BTB 30x  BTB 30x  BTB 30x     TB rows    GTB 30x  GTB 30x  GTB 30x  GTB 30x  GTB 30x  BTB 30x  BTB 30x  BTB 30x     Sup wand flex   10x10"  10" 10x  10" 10x  10" 10x 10" 10x  10" 10x   np  10" 10x     Sup wand ER   10x10"  10" 10x  10" 10x  10" 10x  10" 10x  10" 10x   np  10" 10x     Sidelying ER    2x10  2x10  2x10  2x10  3x10  2x10  np  2x10     Prone T's      10x  10x  2x10  3x10  3x10  np  3x10     Prone I's      10x  10x  2x10  3x10  3x10  np  3x10     Prone Y's      10x  10x  2x10  3x10  3x10  np  3x10     Stand shld flex            2x10  2x10  np  2x10     UBE                4'f/4'b lvl 4  NP     Doorway ER str                 4x20"  20" 4x                                                                                                                                                           Modalities     2/  2/6             CP prn  10'  10'  10'  10'  10'                                                                     Assessment: Pt had no pain post treatment today  He noted feeling as if his ROM is improving and the Supriya are helping with his strength  He required vcs to perform doorway stretch correctly  He was advised to continue with HEP including R shoulder AROM flexion  Plan: Progress treatment as tolerated

## 2019-02-08 ENCOUNTER — OFFICE VISIT (OUTPATIENT)
Dept: PHYSICAL THERAPY | Facility: CLINIC | Age: 69
End: 2019-02-08
Payer: OTHER MISCELLANEOUS

## 2019-02-08 ENCOUNTER — OFFICE VISIT (OUTPATIENT)
Dept: OBGYN CLINIC | Facility: CLINIC | Age: 69
End: 2019-02-08
Payer: OTHER MISCELLANEOUS

## 2019-02-08 VITALS — SYSTOLIC BLOOD PRESSURE: 156 MMHG | DIASTOLIC BLOOD PRESSURE: 76 MMHG | HEART RATE: 85 BPM

## 2019-02-08 DIAGNOSIS — M75.121 COMPLETE TEAR OF RIGHT ROTATOR CUFF: Primary | ICD-10-CM

## 2019-02-08 PROCEDURE — 97112 NEUROMUSCULAR REEDUCATION: CPT

## 2019-02-08 PROCEDURE — 97140 MANUAL THERAPY 1/> REGIONS: CPT

## 2019-02-08 PROCEDURE — 20610 DRAIN/INJ JOINT/BURSA W/O US: CPT | Performed by: ORTHOPAEDIC SURGERY

## 2019-02-08 PROCEDURE — 99213 OFFICE O/P EST LOW 20 MIN: CPT | Performed by: ORTHOPAEDIC SURGERY

## 2019-02-08 PROCEDURE — 97110 THERAPEUTIC EXERCISES: CPT

## 2019-02-08 RX ORDER — METHYLPREDNISOLONE ACETATE 40 MG/ML
1 INJECTION, SUSPENSION INTRA-ARTICULAR; INTRALESIONAL; INTRAMUSCULAR; SOFT TISSUE
Status: COMPLETED | OUTPATIENT
Start: 2019-02-08 | End: 2019-02-08

## 2019-02-08 RX ORDER — OXYCODONE HYDROCHLORIDE 5 MG/1
5 TABLET ORAL DAILY PRN
Qty: 20 TABLET | Refills: 0 | Status: SHIPPED | OUTPATIENT
Start: 2019-02-08 | End: 2019-04-12 | Stop reason: SDUPTHER

## 2019-02-08 RX ADMIN — METHYLPREDNISOLONE ACETATE 1 ML: 40 INJECTION, SUSPENSION INTRA-ARTICULAR; INTRALESIONAL; INTRAMUSCULAR; SOFT TISSUE at 15:04

## 2019-02-08 NOTE — PROGRESS NOTES
Daily Note     Today's date: 2019  Patient name: Negar Yousif  : 1950  MRN: [id number]  Referring provider: Mikey Meyers MD  Dx:   Encounter Diagnosis     ICD-10-CM    1  Complete tear of right rotator cuff M75 121                   Subjective: Pt c/o 5/10 pain in R shoulder today  He states that today is not so bad         Objective: See treatment diary below   Precautions: Right RTC tear, Right hip OA     Daily Treatment Diary      Manual    2/  2  2   Right shoulder PROM    KK  SA  SA  KK  KK  KK  KK  SA  SA                           Re-eval                KK                                                             Exercise Diary    2  2  2   Jewell Beady    5 min  5'  5'  5 min  5 min  5 min  np  5'  5'   TB ER    GTB 25x  GTB 25x  GTB 25x  GTB 30x  GTB 30x  BTB 30x  BTB 30x  BTB 30x  BTB 30x   TB IR    GTB   GTB 25x  GTB 25x  GTB 30x  GTB 30x  BTB 30x  BTB 30x  BTB 30x  BTB 30x   TB shoulder ext    nv  GTB 30x  GTB 30x  GTB 30x  GTB 30x  BTB 30x  BTB 30x  BTB 30x  BTB 30x   TB rows    GTB 30x  GTB 30x  GTB 30x  GTB 30x  GTB 30x  BTB 30x  BTB 30x  BTB 30x  BTB 30x   Sup wand flex   10x10"  10" 10x  10" 10x  10" 10x 10" 10x  10" 10x   np  10" 10x  10" 10x   Sup wand ER   10x10"  10" 10x  10" 10x  10" 10x  10" 10x  10" 10x   np  10" 10x  10" 10x   Sidelying ER    2x10  2x10  2x10  2x10  3x10  2x10  np  2x10  2x10   Prone T's      10x  10x  2x10  3x10  3x10  np  3x10  3x10   Prone I's      10x  10x  2x10  3x10  3x10  np  3x10  3x10   Prone Y's      10x  10x  2x10  3x10  3x10  np  3x10  3x10   Stand shld flex            2x10  2x10  np  2x10  2x10   UBE                4'f/4'b lvl 4  NP  NP   Doorway ER str                 4x20"  20" 4x  20" 4x                                                                                                                                                         Modalities     1/23 1/25 2/4 2/6 2/8           CP prn  10'  10'  10'  10'  10'  10'                                                                  Assessment: Pt had little to no pain post treatment today  hE noted feeling as if his ROM is improving and is he able to move much more as of late  He was educated on HEP including ER doorway stretch to decrease tension outside of therapy  Treatment concluded with CP  Plan: Progress treatment as tolerated

## 2019-02-08 NOTE — PROGRESS NOTES
Assessment:   1  Complete tear of right rotator cuff  oxyCODONE (ROXICODONE) 5 mg immediate release tablet         Plan:   1  Continue PT as prescribed  2  Provided refill of oxycodone 5 mg at patient request to be used only as needed for moderate to severe pain  3  Administered a corticosteroid injection into the R subacromial bursa today (see procedure note below  4  Patient states there is no modified duty available at his job  He will therefore remain out of work until his next evaluation  5  Follow up in 6 weeks for reevaluation     Subjective:  Soo Yao is a 80year old male who presents to clinic for follow up of complete rotator cuff tear of the right shoulder  Patient is progressing well with therapy and feels that his range of motion and strength are improving  He does have residual pain and weakness, especially with external rotation  He would like to have a steroid injection today as he feels it would help him with physical therapy  Objective:  Right Shoulder Examination:   Right shoulder:  Deformity:  None  Tenderness to palpation:  None  Range of motion:   FF:  160 active, 170 passive with pain   ER in abduction:  80 with pain   IR in abduction:  70  Neer impingement sign:  Postive  Root impingement sign:  Negative  Empty can test:  Positive  Speed's test:  Positive  Cross-body adduction test:  Negative  Strength   FF:  4-/5   ER:  4/5    Constitutional:  Well-developed and well-nourished  Eyes:  Anicteric sclerae  Lungs:  Unlabored breathing  Psychiatric:  Mood and affect are appropriate          Large joint arthrocentesis  Date/Time: 2/8/2019 3:04 PM  Consent given by: patient  Site marked: site marked  Timeout: Immediately prior to procedure a time out was called to verify the correct patient, procedure, equipment, support staff and site/side marked as required   Supporting Documentation  Indications: pain   Procedure Details  Location: shoulder - R subacromial bursa  Needle size: 25 G  Approach: anterolateral  Medications administered: 1 mL methylPREDNISolone acetate 40 mg/mL    Patient tolerance: patient tolerated the procedure well with no immediate complications  Dressing:  Sterile dressing applied

## 2019-02-11 ENCOUNTER — OFFICE VISIT (OUTPATIENT)
Dept: PHYSICAL THERAPY | Facility: CLINIC | Age: 69
End: 2019-02-11
Payer: OTHER MISCELLANEOUS

## 2019-02-11 DIAGNOSIS — M75.121 COMPLETE TEAR OF RIGHT ROTATOR CUFF: Primary | ICD-10-CM

## 2019-02-11 PROCEDURE — 97110 THERAPEUTIC EXERCISES: CPT | Performed by: PHYSICAL THERAPIST

## 2019-02-11 PROCEDURE — 97010 HOT OR COLD PACKS THERAPY: CPT | Performed by: PHYSICAL THERAPIST

## 2019-02-11 PROCEDURE — 97112 NEUROMUSCULAR REEDUCATION: CPT | Performed by: PHYSICAL THERAPIST

## 2019-02-11 PROCEDURE — 97140 MANUAL THERAPY 1/> REGIONS: CPT | Performed by: PHYSICAL THERAPIST

## 2019-02-11 NOTE — PROGRESS NOTES
Daily Note     Today's date: 2019  Patient name: Rolando Johnson  : 1950  MRN: [id number]  Referring provider: Christine Wyatt MD  Dx:   Encounter Diagnosis     ICD-10-CM    1  Complete tear of right rotator cuff M75 121                   Subjective: Patient stated his shoulder was sore prior to treatment session secondary to receiving injection on   Objective: See treatment diary below   Precautions: Right RTC tear, Right hip OA     Daily Treatment Diary      Manual    2   Right shoulder PROM  KK  KK  SA  SA  KK  KK  KK  KK  SA  SA                           Re-eval                KK                                                             Exercise Diary     Pulleys  np  5 min  5'  5'  5 min  5 min  5 min  np  5'  5'   TB ER  BTB 30x  GTB 25x  GTB 25x  GTB 25x  GTB 30x  GTB 30x  BTB 30x  BTB 30x  BTB 30x  BTB 30x   TB IR  BTB 30x  GTB   GTB 25x  GTB 25x  GTB 30x  GTB 30x  BTB 30x  BTB 30x  BTB 30x  BTB 30x   TB shoulder ext  BTB 30x  nv  GTB 30x  GTB 30x  GTB 30x  GTB 30x  BTB 30x  BTB 30x  BTB 30x  BTB 30x   TB rows  BTB 30x  GTB 30x  GTB 30x  GTB 30x  GTB 30x  GTB 30x  BTB 30x  BTB 30x  BTB 30x  BTB 30x   Sup wand flex  10x10" 10x10"  10" 10x  10" 10x  10" 10x 10" 10x  10" 10x   np  10" 10x  10" 10x   Sup wand ER  10x10" 10x10"  10" 10x  10" 10x  10" 10x  10" 10x  10" 10x   np  10" 10x  10" 10x   Sidelying ER  1# 2x10  2x10  2x10  2x10  2x10  3x10  2x10  np  2x10  2x10   Prone T's  1# 2x10   10x  10x  2x10  3x10  3x10  np  3x10  3x10   Prone I's  1# 2x10    10x  10x  2x10  3x10  3x10  np  3x10  3x10   Prone Y's  1# 2x10   10x  10x  2x10  3x10  3x10  np  3x10  3x10   Stand shld flex  2x10          2x10  2x10  np  2x10  2x10   UBE 4'f/4' b              4'f/4'b lvl 4  NP  NP   Doorway ER str   20" x 4             4x20"  20" 4x  20" 4x                                                                                                                                                         Modalities   1/21 1/23 1/25 2/4 2/6 2/8           CP prn  10'  10'  10'  10'  10'  10'                                                                  Assessment: Patient performed progression to 1 lb  weight with exercises as listed without c/o pain; minimal pain with manual shoulder PROM  Plan: Progress treatment as tolerated

## 2019-02-13 ENCOUNTER — OFFICE VISIT (OUTPATIENT)
Dept: PHYSICAL THERAPY | Facility: CLINIC | Age: 69
End: 2019-02-13
Payer: OTHER MISCELLANEOUS

## 2019-02-13 DIAGNOSIS — M75.121 COMPLETE TEAR OF RIGHT ROTATOR CUFF: Primary | ICD-10-CM

## 2019-02-13 PROCEDURE — 97112 NEUROMUSCULAR REEDUCATION: CPT | Performed by: PHYSICAL THERAPIST

## 2019-02-13 PROCEDURE — 97010 HOT OR COLD PACKS THERAPY: CPT | Performed by: PHYSICAL THERAPIST

## 2019-02-13 PROCEDURE — 97140 MANUAL THERAPY 1/> REGIONS: CPT | Performed by: PHYSICAL THERAPIST

## 2019-02-13 PROCEDURE — 97110 THERAPEUTIC EXERCISES: CPT | Performed by: PHYSICAL THERAPIST

## 2019-02-13 NOTE — PROGRESS NOTES
Daily Note     Today's date: 2019  Patient name: Negar Yousif  : 1950  MRN: [id number]  Referring provider: Mikey Meyers MD  Dx:   Encounter Diagnosis     ICD-10-CM    1  Complete tear of right rotator cuff M75 121                   Subjective: Patient stated moderate soreness in his shoulder prior to treatment session attributed to shoveling snow this morning  Objective: See treatment diary below   Precautions: Right RTC tear, Right hip OA     Daily Treatment Diary      Manual    2  2  2   Right shoulder PROM  KK  KK  SA  SA  KK  KK  KK  KK  SA  SA                           Re-eval                KK                                                             Exercise Diary    2  28   Pulleys  np  5 min  5'  5'  5 min  5 min  5 min  np  5'  5'   TB ER  BTB 30x  BTB 30x  GTB 25x  GTB 25x  GTB 30x  GTB 30x  BTB 30x  BTB 30x  BTB 30x  BTB 30x   TB IR  BTB 30x   BTB 30x  GTB 25x  GTB 25x  GTB 30x  GTB 30x  BTB 30x  BTB 30x  BTB 30x  BTB 30x   TB shoulder ext  BTB 30x  BTB 30x  GTB 30x  GTB 30x  GTB 30x  GTB 30x  BTB 30x  BTB 30x  BTB 30x  BTB 30x   TB rows  BTB 30x  BTB 30x  GTB 30x  GTB 30x  GTB 30x  GTB 30x  BTB 30x  BTB 30x  BTB 30x  BTB 30x   Sup wand flex  10x10" np  10" 10x  10" 10x  10" 10x 10" 10x  10" 10x   np  10" 10x  10" 10x   Sup wand ER  10x10" np  10" 10x  10" 10x  10" 10x  10" 10x  10" 10x   np  10" 10x  10" 10x   Sidelying ER  1# 2x10 1# 2x10  2x10  2x10  2x10  3x10  2x10  np  2x10  2x10   Prone T's  1# 2x10 1# 2x10  10x  10x  2x10  3x10  3x10  np  3x10  3x10   Prone I's  1# 2x10 1# 3x10  10x  10x  2x10  3x10  3x10  np  3x10  3x10   Prone Y's  1# 2x10 1# 2x10  10x  10x  2x10  3x10  3x10  np  3x10  3x10   Stand shld flex  2x10  2x10       2x10  2x10  np  2x10  2x10   UBE 4'f/4' b  np            4'f/4'b lvl 4  NP  NP   Doorway ER str   20" x 4 np            4x20"  20" 4x  20" 4x                                                                                                                                                         Modalities   1/21 1/23 1/25 2/4 2/6 2/8           CP prn  10'  10'  10'  10'  10'  10'                                                                  Assessment: Patient unable to perform doorway ER stretch this visit secondary to pain; exercises np as listed secondary to pain from snow shoveling; moderate pain with manual shoulder PROM  Plan: Progress treatment as tolerated

## 2019-02-15 ENCOUNTER — OFFICE VISIT (OUTPATIENT)
Dept: PHYSICAL THERAPY | Facility: CLINIC | Age: 69
End: 2019-02-15
Payer: OTHER MISCELLANEOUS

## 2019-02-15 DIAGNOSIS — M75.121 COMPLETE TEAR OF RIGHT ROTATOR CUFF: Primary | ICD-10-CM

## 2019-02-15 PROCEDURE — 97110 THERAPEUTIC EXERCISES: CPT | Performed by: PHYSICAL THERAPIST

## 2019-02-15 PROCEDURE — 97140 MANUAL THERAPY 1/> REGIONS: CPT | Performed by: PHYSICAL THERAPIST

## 2019-02-15 PROCEDURE — 97112 NEUROMUSCULAR REEDUCATION: CPT | Performed by: PHYSICAL THERAPIST

## 2019-02-15 PROCEDURE — 97010 HOT OR COLD PACKS THERAPY: CPT | Performed by: PHYSICAL THERAPIST

## 2019-02-15 NOTE — PROGRESS NOTES
Daily Note     Today's date: 2/15/2019  Patient name: Cecilia Mukherjee  : 1950  MRN: [id number]  Referring provider: Laura Jackson MD  Dx:   Encounter Diagnosis     ICD-10-CM    1  Complete tear of right rotator cuff M75 121                   Subjective: Patient stated decreased soreness in his shoulder this visit as compared to last treatment session  Objective: See treatment diary below   Precautions: Right RTC tear, Right hip OA     Daily Treatment Diary      Manual  2/11  2/13  2/15  1/23  1/25  1/28  2/1  2  2  2   Right shoulder PROM  KK  KK  KK  SA  KK  KK  KK  KK  SA  SA                           Re-eval                KK                                                             Exercise Diary  2/11  2/15  1/21  1/23  1/25  1/28  2/1  2/4  2/6  28   Pulleys  np  5 min  5'  5'  5 min  5 min  5 min  np  5'  5'   TB ER  BTB 30x  BTB 30x  GTB 25x  GTB 25x  GTB 30x  GTB 30x  BTB 30x  BTB 30x  BTB 30x  BTB 30x   TB IR  BTB 30x   BTB 30x  GTB 25x  GTB 25x  GTB 30x  GTB 30x  BTB 30x  BTB 30x  BTB 30x  BTB 30x   TB shoulder ext  BTB 30x  BTB 30x  GTB 30x  GTB 30x  GTB 30x  GTB 30x  BTB 30x  BTB 30x  BTB 30x  BTB 30x   TB rows  BTB 30x  BTB 30x  GTB 30x  GTB 30x  GTB 30x  GTB 30x  BTB 30x  BTB 30x  BTB 30x  BTB 30x   Sup wand flex  10x10" 10x10"  10" 10x  10" 10x  10" 10x 10" 10x  10" 10x   np  10" 10x  10" 10x   Sup wand ER  10x10" 10x10"  10" 10x  10" 10x  10" 10x  10" 10x  10" 10x   np  10" 10x  10" 10x   Sidelying ER  1# 2x10 np  2x10  2x10  2x10  3x10  2x10  np  2x10  2x10   Prone T's  1# 2x10 np  10x  10x  2x10  3x10  3x10  np  3x10  3x10   Prone I's  1# 2x10 np  10x  10x  2x10  3x10  3x10  np  3x10  3x10   Prone Y's  1# 2x10 np  10x  10x  2x10  3x10  3x10  np  3x10  3x10   Stand shld flex  2x10  2x10       2x10  2x10  np  2x10  2x10   UBE 4'f/4' b 4'f/4'b            4'f/4'b lvl 4  NP  NP   Doorway ER str   20" x 4 20"x4            4x20"  20" 4x  20" 4x                                                                                                                                                         Modalities   1/21  1/23  1/25  2/4  2/6  2/8  2/15         CP prn  10'  10'  10'  10'  10'  10'  10'                                                                Assessment: Patient able to resume exercises as listed this visit; patient declined performing exercises as listed secondary to soreness in shoulder; minimal pain with manual shoulder PROM  Plan: Progress treatment as tolerated

## 2019-02-18 ENCOUNTER — OFFICE VISIT (OUTPATIENT)
Dept: PHYSICAL THERAPY | Facility: CLINIC | Age: 69
End: 2019-02-18
Payer: OTHER MISCELLANEOUS

## 2019-02-18 DIAGNOSIS — M75.121 COMPLETE TEAR OF RIGHT ROTATOR CUFF: Primary | ICD-10-CM

## 2019-02-18 PROCEDURE — 97112 NEUROMUSCULAR REEDUCATION: CPT | Performed by: PHYSICAL THERAPIST

## 2019-02-18 PROCEDURE — 97140 MANUAL THERAPY 1/> REGIONS: CPT | Performed by: PHYSICAL THERAPIST

## 2019-02-18 PROCEDURE — 97010 HOT OR COLD PACKS THERAPY: CPT | Performed by: PHYSICAL THERAPIST

## 2019-02-18 PROCEDURE — 97110 THERAPEUTIC EXERCISES: CPT | Performed by: PHYSICAL THERAPIST

## 2019-02-18 NOTE — PROGRESS NOTES
Daily Note     Today's date: 2019  Patient name: Jimi Waggoner  : 1950  MRN: [id number]  Referring provider: Adrianna Walter MD  Dx:   Encounter Diagnosis     ICD-10-CM    1  Complete tear of right rotator cuff M75 121                   Subjective: Patient stated no significant pain prior to treatment session this visit  Objective: See treatment diary below   Precautions: Right RTC tear, Right hip OA     Daily Treatment Diary      Manual  2/11  2/13  2/15  2/18  1/25  1/28  2/1  2/4  2/6  2   Right shoulder PROM  KK  KK  KK  KK  KK  KK  KK  KK  SA  SA                           Re-eval                KK                                                             Exercise Diary  2/11  2/15  2/18  1/23  1/25  1/28  2/1  2/4  2/6  2/8   Pulleys  np  5 min  5'  5'  5 min  5 min  5 min  np  5'  5'   TB ER  BTB 30x  BTB 30x  BTB 30x  GTB 25x  GTB 30x  GTB 30x  BTB 30x  BTB 30x  BTB 30x  BTB 30x   TB IR  BTB 30x   BTB 30x BTB 30x  GTB 25x  GTB 30x  GTB 30x  BTB 30x  BTB 30x  BTB 30x  BTB 30x   TB shoulder ext  BTB 30x  BTB 30x BTB 30x  GTB 30x  GTB 30x  GTB 30x  BTB 30x  BTB 30x  BTB 30x  BTB 30x   TB rows  BTB 30x  BTB 30x BTB 30x  GTB 30x  GTB 30x  GTB 30x  BTB 30x  BTB 30x  BTB 30x  BTB 30x   Sup wand flex  10x10" 10x10"  10" 10x  10" 10x  10" 10x 10" 10x  10" 10x   np  10" 10x  10" 10x   Sup wand ER  10x10" 10x10"  10" 10x  10" 10x  10" 10x  10" 10x  10" 10x   np  10" 10x  10" 10x   Sidelying ER  1# 2x10 np 2# 2x10  2x10  2x10  3x10  2x10  np  2x10  2x10   Prone T's  1# 2x10 np  2# 2x10  10x  2x10  3x10  3x10  np  3x10  3x10   Prone I's  1# 2x10 np 2# 2x10  10x  2x10  3x10  3x10  np  3x10  3x10   Prone Y's  1# 2x10 np  2# 2x10  10x  2x10  3x10  3x10  np  3x10  3x10   Stand shld flex  2x10  2x10 2x10      2x10  2x10  np  2x10  2x10   UBE 4'f/4' b 4'f/4'b 4'f/4'b          4'f/4'b lvl 4  NP  NP   Doorway ER str   20" x 4 20"x4  20"x4        4x20"  20" 4x  20" 4x                                                                                                                                                         Modalities   1/21  1/23  1/25  2/4  2/6  2/8  2/15  2/18       CP prn  10'  10'  10'  10'  10'  10'  10'  10'                                                              Assessment: Patient performed progression to 2 lb  with exercises as listed without c/o pain; moderate pain with manual shoulder PROM  Plan: Progress treatment as tolerated

## 2019-02-20 ENCOUNTER — APPOINTMENT (OUTPATIENT)
Dept: PHYSICAL THERAPY | Facility: CLINIC | Age: 69
End: 2019-02-20
Payer: OTHER MISCELLANEOUS

## 2019-02-21 DIAGNOSIS — M25.50 ARTHRALGIA, UNSPECIFIED JOINT: ICD-10-CM

## 2019-02-21 RX ORDER — MELOXICAM 15 MG/1
15 TABLET ORAL DAILY
Qty: 90 TABLET | Refills: 0 | Status: SHIPPED | OUTPATIENT
Start: 2019-02-21 | End: 2019-05-20 | Stop reason: SDUPTHER

## 2019-02-22 ENCOUNTER — OFFICE VISIT (OUTPATIENT)
Dept: PHYSICAL THERAPY | Facility: CLINIC | Age: 69
End: 2019-02-22
Payer: OTHER MISCELLANEOUS

## 2019-02-22 DIAGNOSIS — M75.121 COMPLETE TEAR OF RIGHT ROTATOR CUFF: Primary | ICD-10-CM

## 2019-02-22 PROCEDURE — 97110 THERAPEUTIC EXERCISES: CPT | Performed by: PHYSICAL THERAPIST

## 2019-02-22 PROCEDURE — 97140 MANUAL THERAPY 1/> REGIONS: CPT | Performed by: PHYSICAL THERAPIST

## 2019-02-22 NOTE — PROGRESS NOTES
Daily Note     Today's date: 2019  Patient name: Citlali Bull  : 1950  MRN: [id number]  Referring provider: Idalia Bernal MD  Dx:   Encounter Diagnosis     ICD-10-CM    1  Complete tear of right rotator cuff M75 121                   Subjective: Reports having some pain today, which he believes is from being sick and doing his exercises for a few days  Objective: See treatment diary below   Precautions: Right RTC tear, Right hip OA     Daily Treatment Diary      Manual  2/11  2/13  2/15  2/18 2/22    2/4  2/6  2/8   Right shoulder PROM  KK  KK  KK  KK DD    KK  SA  SA                        Re-eval             KK                                                          Exercise Diary  2/11  2/15  2/18 2/22    2/1  2/4  2/6  2/8   Pulleys  np  5 min  5' 5'    5 min  np  5'  5'   TB ER  BTB 30x  BTB 30x  BTB 30x  BTB 30x    BTB 30x  BTB 30x  BTB 30x  BTB 30x   TB IR  BTB 30x   BTB 30x BTB 30x BTB 30x    BTB 30x  BTB 30x  BTB 30x  BTB 30x   TB shoulder ext  BTB 30x  BTB 30x BTB 30x PTB 30x    BTB 30x  BTB 30x  BTB 30x  BTB 30x   TB rows  BTB 30x  BTB 30x BTB 30x BTB 30x    BTB 30x  BTB 30x  BTB 30x  BTB 30x   Sup wand flex  10x10" 10x10"  10" 10x 10" 10x    10" 10x   np  10" 10x  10" 10x   Sup wand ER  10x10" 10x10"  10" 10x 10" 10x    10" 10x   np  10" 10x  10" 10x   Sidelying ER  1# 2x10 np 2# 2x10 2# 2x10    2x10  np  2x10  2x10   Prone T's  1# 2x10 np  2# 2x10 2# 2x10    3x10  np  3x10  3x10   Prone I's  1# 2x10 np 2# 2x10 2# 2x10    3x10  np  3x10  3x10   Prone Y's  1# 2x10 np  2# 2x10 2# 2x10    3x10  np  3x10  3x10   Stand shld flex  2x10  2x10 2x10 2x10    2x10  np  2x10  2x10   UBE 4'f/4' b 4'f/4'b 4'f/4'b  4'f/4'b      4'f/4'b lvl 4  NP  NP   Doorway ER str   20" x 4 20"x4  20"x4 20"x4      4x20"  20" 4x  20" 4x                                                                                                                                          Modalities     2/6  2/8  2/15  2/18       CP prn  10'  10'  10'  10'  10'  10'  10'  10'                                                          Pt was 1 on 1 from 1 - 951    Assessment: Pt performed well during session  Able to finish exercises with no complaints of pain increase  Able to tolerate purple TB for rows today with only mod fatigue after  Occasional verbal cues needed to perform exercises properly  Would benefit from continued PT  Plan: Progress treatment as tolerated

## 2019-02-25 ENCOUNTER — OFFICE VISIT (OUTPATIENT)
Dept: PHYSICAL THERAPY | Facility: CLINIC | Age: 69
End: 2019-02-25
Payer: OTHER MISCELLANEOUS

## 2019-02-25 DIAGNOSIS — M75.121 COMPLETE TEAR OF RIGHT ROTATOR CUFF: Primary | ICD-10-CM

## 2019-02-25 PROCEDURE — 97010 HOT OR COLD PACKS THERAPY: CPT

## 2019-02-25 PROCEDURE — 97140 MANUAL THERAPY 1/> REGIONS: CPT

## 2019-02-25 PROCEDURE — 97110 THERAPEUTIC EXERCISES: CPT

## 2019-02-25 PROCEDURE — 97112 NEUROMUSCULAR REEDUCATION: CPT

## 2019-02-25 NOTE — PROGRESS NOTES
Daily Note     Today's date: 2019  Patient name: Ashley Johnson  : 1950  MRN: [id number]  Referring provider: Sanjay Rod MD  Dx:   Encounter Diagnosis     ICD-10-CM    1  Complete tear of right rotator cuff M75 121                   Subjective: Pt reports that he is in some pain today 4/10 at times throughout the day  Notes that he is feeling better compared to last week and has been compliant with his HEP  Objective: See treatment diary below   Precautions: Right RTC tear, Right hip OA     Daily Treatment Diary      Manual  2/11  2/13  2/15  2/18 2/22 2/25   2/4  2/6  2/8   Right shoulder PROM  KK  KK  KK  KK DD MM   KK  SA  SA                        Re-eval             KK                                                          Exercise Diary  2/11  2/15  2/18 2/22 2/25   2/1  2/4  2/6  2/8   Pulleys  np  5 min  5' 5' 5'   5 min  np  5'  5'   TB ER  BTB 30x  BTB 30x  BTB 30x  BTB 30x BTB 30x   BTB 30x  BTB 30x  BTB 30x  BTB 30x   TB IR  BTB 30x   BTB 30x BTB 30x BTB 30x BTB 30x   BTB 30x  BTB 30x  BTB 30x  BTB 30x   TB shoulder ext  BTB 30x  BTB 30x BTB 30x PTB 30x PTB 30x   BTB 30x  BTB 30x  BTB 30x  BTB 30x   TB rows  BTB 30x  BTB 30x BTB 30x BTB 30x BTB 30x   BTB 30x  BTB 30x  BTB 30x  BTB 30x   Sup wand flex  10x10" 10x10"  10" 10x 10" 10x 10"x10   10" 10x   np  10" 10x  10" 10x   Sup wand ER  10x10" 10x10"  10" 10x 10" 10x 10"x10   10" 10x   np  10" 10x  10" 10x   Sidelying ER  1# 2x10 np 2# 2x10 2# 2x10 2# 2x10   2x10  np  2x10  2x10   Prone T's  1# 2x10 np  2# 2x10 2# 2x10 2# 2x10   3x10  np  3x10  3x10   Prone I's  1# 2x10 np 2# 2x10 2# 2x10 2# 2x10   3x10  np  3x10  3x10   Prone Y's  1# 2x10 np  2# 2x10 2# 2x10 2# 2x10   3x10  np  3x10  3x10   Stand shld flex  2x10  2x10 2x10 2x10 2x10   2x10  np  2x10  2x10   UBE 4'f/4' b 4'f/4'b 4'f/4'b  4'f/4'b 4'f/4'b     4'f/4'b lvl 4  NP  NP   Doorway ER str   20" x 4 20"x4  20"x4 20"x4 20"x4     4x20"  20" 4x  20" 4x                                                                                                                                        Modalities   1/21  1/23  1/25  2/4  2/6  2/8  2/15  2/18  2/25     CP prn  10'  10'  10'  10'  10'  10'  10'  10'  10'                                                          Assessment: Pt continued to perform all TE in a pain free range  Continued to focus on ROM, and strengthening  Minimal discomfort with doorway stretch into ER  Pt demonstrated both competence and compliance with all other TE performed  Plan: Progress treatment as tolerated

## 2019-02-27 ENCOUNTER — OFFICE VISIT (OUTPATIENT)
Dept: PHYSICAL THERAPY | Facility: CLINIC | Age: 69
End: 2019-02-27
Payer: OTHER MISCELLANEOUS

## 2019-02-27 DIAGNOSIS — M75.121 COMPLETE TEAR OF RIGHT ROTATOR CUFF: Primary | ICD-10-CM

## 2019-02-27 PROCEDURE — 97110 THERAPEUTIC EXERCISES: CPT | Performed by: PHYSICAL THERAPIST

## 2019-02-27 PROCEDURE — 97140 MANUAL THERAPY 1/> REGIONS: CPT | Performed by: PHYSICAL THERAPIST

## 2019-02-27 NOTE — PROGRESS NOTES
Daily Note     Today's date: 2019  Patient name: Jax Hamilton  : 1950  MRN: [id number]  Referring provider: Ish Azul MD  Dx:   Encounter Diagnosis     ICD-10-CM    1  Complete tear of right rotator cuff M75 121                   Subjective: Reports continued pain, but feels his ROM has improved  Arrived late for appointment but was accommodated  Objective: See treatment diary below   Precautions: Right RTC tear, Right hip OA     Daily Treatment Diary      Manual  2/11  2/13  2/15  2/18 2/22 2/25 2/27  2/  2  2   Right shoulder PROM  KK  KK  KK  KK DD MM DD  KK  SA  SA                        Re-eval             KK                                                          Exercise Diary  2/11  2/15  2/18 2/22 2/25 2/27   2/4  2/6  2/8   Pulleys  np  5 min  5' 5' 5' 5'   np  5'  5'   TB ER  BTB 30x  BTB 30x  BTB 30x  BTB 30x BTB 30x BTB 30x   BTB 30x  BTB 30x  BTB 30x   TB IR  BTB 30x   BTB 30x BTB 30x BTB 30x BTB 30x BTB 30x   BTB 30x  BTB 30x  BTB 30x   TB shoulder ext  BTB 30x  BTB 30x BTB 30x PTB 30x PTB 30x PTB 30x   BTB 30x  BTB 30x  BTB 30x   TB rows  BTB 30x  BTB 30x BTB 30x BTB 30x BTB 30x PTB 30x   BTB 30x  BTB 30x  BTB 30x   Sup wand flex  10x10" 10x10"  10" 10x 10" 10x 10"x10 D/c   np  10" 10x  10" 10x   Sup wand ER  10x10" 10x10"  10" 10x 10" 10x 10"x10 10"x10   np  10" 10x  10" 10x   Sidelying ER  1# 2x10 np 2# 2x10 2# 2x10 2# 2x10 2# 2x10   np  2x10  2x10   Prone T's  1# 2x10 np  2# 2x10 2# 2x10 2# 2x10 2# 2x10   np  3x10  3x10   Prone I's  1# 2x10 np 2# 2x10 2# 2x10 2# 2x10 2# 2x10   np  3x10  3x10   Prone Y's  1# 2x10 np  2# 2x10 2# 2x10 2# 2x10 2# 2x10   np  3x10  3x10   Stand shld flex  2x10  2x10 2x10 2x10 2x10 2x10   np  2x10  2x10   UBE 4'f/4' b 4'f/4'b 4'f/4'b  4'f/4'b 4'f/4'b 4'f/4'b   4'f/4'b lvl 4  NP  NP   Doorway ER str   20" x 4 20"x4  20"x4 20"x4 20"x4 20"x4   4x20"  20" 4x  20" 4x                                                                                                                                       Modalities   1/21  1/23  1/25  2/4  2/6  2/8  2/15  2/18  2/25  2/27   CP prn  10'  10'  10'  10'  10'  10'  10'  10'  10'  10'                                                        Pt 1 on 1 from 950 to 1028    Assessment: Pt performed well during session  Able to perform exercises with minimal fatigue, but continues to be limited by pain with arm overhead or when increasing resistance  Occasional cues needed for technique  Would benefit from continued PT  Plan: Progress treatment as tolerated

## 2019-03-01 ENCOUNTER — OFFICE VISIT (OUTPATIENT)
Dept: PHYSICAL THERAPY | Facility: CLINIC | Age: 69
End: 2019-03-01
Payer: OTHER MISCELLANEOUS

## 2019-03-01 DIAGNOSIS — M75.121 COMPLETE TEAR OF RIGHT ROTATOR CUFF: Primary | ICD-10-CM

## 2019-03-01 PROCEDURE — 97140 MANUAL THERAPY 1/> REGIONS: CPT | Performed by: PHYSICAL THERAPIST

## 2019-03-01 PROCEDURE — 97110 THERAPEUTIC EXERCISES: CPT | Performed by: PHYSICAL THERAPIST

## 2019-03-01 NOTE — PROGRESS NOTES
Daily Note     Today's date: 3/1/2019  Patient name: Ashley Johnson  : 1950  MRN: [id number]  Referring provider: Sanjay Rod MD  Dx:   Encounter Diagnosis     ICD-10-CM    1  Complete tear of right rotator cuff M75 121                   Subjective: Pt arrived late for appt today, but was accommodated and agreed to do shorter session today  Reports feeling well with no changes in pain     Objective: See treatment diary below   Precautions: Right RTC tear, Right hip OA     Daily Treatment Diary      Manual  2/11  2/13  2/15  2/18 2/22 2/25 2/27 3/1     Right shoulder PROM  KK  KK  KK  KK DD MM DD DD      Shoulder inf mobs            DD     Re-eval                                                                 Exercise Diary  2/11  2/15  2/18 2/22 2/25 2/27 3/1      Pulleys  np  5 min  5' 5' 5' 5' 5'      TB ER  BTB 30x  BTB 30x  BTB 30x  BTB 30x BTB 30x BTB 30x Held for today      TB IR  BTB 30x   BTB 30x BTB 30x BTB 30x BTB 30x BTB 30x BTB 30x      TB shoulder ext  BTB 30x  BTB 30x BTB 30x PTB 30x PTB 30x PTB 30x PTB 30x      TB rows  BTB 30x  BTB 30x BTB 30x BTB 30x BTB 30x PTB 30x Held for today      Sup wand flex  10x10" 10x10"  10" 10x 10" 10x 10"x10 D/c       Sup wand ER  10x10" 10x10"  10" 10x 10" 10x 10"x10 10"x10 10"x10      Sidelying ER  1# 2x10 np 2# 2x10 2# 2x10 2# 2x10 2# 2x10       Prone T's  1# 2x10 np  2# 2x10 2# 2x10 2# 2x10 2# 2x10 2# 2x10      Prone I's  1# 2x10 np 2# 2x10 2# 2x10 2# 2x10 2# 2x10 2# 2x10      Prone Y's  1# 2x10 np  2# 2x10 2# 2x10 2# 2x10 2# 2x10 2# 2x10      Stand shld flex  2x10  2x10 2x10 2x10 2x10 2x10 2x10      UBE 4'f/4' b 4'f/4'b 4'f/4'b  4'f/4'b 4'f/4'b 4'f/4'b       Doorway ER str   20" x 4 20"x4  20"x4 20"x4 20"x4 20"x4                          Body blade                  Stand scap                     ER/IR at 80                                                                       Modalities  3/            CP prn 10'                                                               Pt 1 on 1 from 1003 to 1030  Assessment: Pt was performing well with exercises, but at end of session while doing shoulder ER with band reported feeling a click in his shoulder and having pain  Exercise was being performed with proper technique with no change in resistance used  Rest of exercises were held due to this sudden increase in pain  Pt was given ice and instructed not to perform any other exercises today  Told he may continue HEP if his pain improves, but if persistent or worse should hold HEP until he can call or come for next visit  Would benefit from continued PT  Plan: Progress treatment as tolerated

## 2019-03-04 ENCOUNTER — APPOINTMENT (OUTPATIENT)
Dept: PHYSICAL THERAPY | Facility: CLINIC | Age: 69
End: 2019-03-04
Payer: OTHER MISCELLANEOUS

## 2019-03-05 ENCOUNTER — EVALUATION (OUTPATIENT)
Dept: PHYSICAL THERAPY | Facility: CLINIC | Age: 69
End: 2019-03-05
Payer: OTHER MISCELLANEOUS

## 2019-03-05 DIAGNOSIS — M75.121 COMPLETE TEAR OF RIGHT ROTATOR CUFF: Primary | ICD-10-CM

## 2019-03-05 PROCEDURE — 97110 THERAPEUTIC EXERCISES: CPT | Performed by: PHYSICAL THERAPIST

## 2019-03-05 PROCEDURE — 97164 PT RE-EVAL EST PLAN CARE: CPT | Performed by: PHYSICAL THERAPIST

## 2019-03-05 NOTE — PROGRESS NOTES
PT Re-Evaluation     Today's date: 3/5/2019  Patient name: Fuad Maldonado  : 1950  MRN: [id number]  Referring provider: Brandy Natarajan MD  Dx:   Encounter Diagnosis     ICD-10-CM    1  Complete tear of right rotator cuff M75 121                   Assessment  Assessment details: Pt has demonstrated consistent improvement over past 4 weeks despite recent set back which resulted in greater than normal shoulder pain and tightness today  Special tests today were NOT indicative of significant rotator cuff tear  Pt appears to be experiencing increased irritation to biceps tendon or supraspinatus muscle  Reviewed with pt proper technique and safety when performing exercises, however has been consistently demonstrating good safe technique with exercises and has been able to perform exercises w/o difficulty previously with no recent changes to exercise type, volume, or intensity for past several sessions  Will slowly reintegrate pt into exercises as tolerated and continue progressing while continuing to monitor pain  Despite recent increase in pain, demonstrated improved AROM today compared to previous assessment along with small improvements in strength  Pt expected to recover to baseline strength and pain levels and then continue progressing with skilled PT     Impairments: abnormal or restricted ROM, impaired physical strength and pain with function  Understanding of Dx/Px/POC: good   Prognosis: good    Goals  Short term goals - to be achieved in 4 weeks:    Decrease pain 20-50% - not met   Increase strength by 1/2 grade - met   Improve range of motion by 25% - partially met  Long term goals - to be achieved by discharge:    Overhead reaching is improved to maximal level of function - partially met   Lifting is improved to maximal level of function - partially met    IADL performance in related activities is improved to maximal level of function - partially met    Performance in related work activities is improved to maximal level of function - not met (Patient out of work)    Plan  Plan details: Continue POC for 4 more weeks  Refer back to ortho if pain and function do not continue to improve  Decrease intensity of exercise intensity NV and progress as tolerated  Planned therapy interventions: manual therapy, neuromuscular re-education, patient education, stretching, strengthening, therapeutic activities, therapeutic exercise and home exercise program  Frequency: 2-3 times per week  Duration in weeks: 4  Plan of Care beginning date: 2019  Plan of Care expiration date: 5/3/2019  Treatment plan discussed with: patient        Subjective Evaluation    History of Present Illness  Mechanism of injury: Since last session pt reports having some increased pain and difficulty lifting arm overhead  Iced his shoulder for a few day and used OTC pain medications which he states were helpful  Still not feels he has returned to his baseline  As instructed, pt did not perform HEP for past few days due to pain  States his pain has been steadily improving along with his AROM over head in the past few days  Before having set back this past Friday, pt reports progressing well with PT noticiting great improvement with pain, strength, and ROM     Pain  Current pain ratin  At best pain ratin  At worst pain ratin          Posture: slight forward head posture     TTP: Biceps groove       Shoulder MMT's  Right  Left   Forward Flexion 3+ pain 5   Extension 4+    Abduction 4 pain  5   Ext Rot 4 5   Int Rot 5 5        Mid trap 4 5   Lower trap 4 5        Elbow flex 5 5   Elbow ext 5 5     Shoulder ROM Right  Left   Flexion 170 180   Extension     Abduction 170 180   Ext Rot 65 90   Int Rot 70 70       Scapular Kinematics: Slight scapular winging on R side     Drop arm test: neg   Lateral rotation Lag sign: neg    Speed's test: pos  Yergason's test: neg     Precautions: Right RTC tear, Right hip OA     Daily Treatment Diary    Manual  2/11  2/13  2/15  2/18 2/22 2/25 2/27 3/1     Right shoulder PROM  KK  KK  KK  KK DD MM DD DD      Shoulder inf mobs            DD     Re-eval                                                                 Exercise Diary  2/11  2/15  2/18 2/22 2/25 2/27 3/1 3/4     Pulleys  np  5 min  5' 5' 5' 5' 5' 5'     TB ER  BTB 30x  BTB 30x  BTB 30x  BTB 30x BTB 30x BTB 30x Held for today Lighter band NV     TB IR  BTB 30x   BTB 30x BTB 30x BTB 30x BTB 30x BTB 30x BTB 30x Lighter band NV     TB shoulder ext  BTB 30x  BTB 30x BTB 30x PTB 30x PTB 30x PTB 30x PTB 30x Lighter band NV     TB rows  BTB 30x  BTB 30x BTB 30x BTB 30x BTB 30x PTB 30x Held for today Lighter band NV     Sup wand flex  10x10" 10x10"  10" 10x 10" 10x 10"x10 D/c       Sup wand ER  10x10" 10x10"  10" 10x 10" 10x 10"x10 10"x10 10"x10      Sidelying ER  1# 2x10 np 2# 2x10 2# 2x10 2# 2x10 2# 2x10       Prone T's  1# 2x10 np  2# 2x10 2# 2x10 2# 2x10 2# 2x10 2# 2x10 Less # NV     Prone I's  1# 2x10 np 2# 2x10 2# 2x10 2# 2x10 2# 2x10 2# 2x10 Less # NV     Prone Y's  1# 2x10 np  2# 2x10 2# 2x10 2# 2x10 2# 2x10 2# 2x10 Less # NV     Stand shld flex  2x10  2x10 2x10 2x10 2x10 2x10 2x10      UBE 4'f/4' b 4'f/4'b 4'f/4'b  4'f/4'b 4'f/4'b 4'f/4'b  4'f/4'b     Doorway ER str  20" x 4 20"x4  20"x4 20"x4 20"x4 20"x4                          Body blade            hold      Stand scap                     ER/IR at 90             hold                                                           Modalities  3/1            CP prn 10'                                                             *spent time reviewing HEP and performing exercises with good technique

## 2019-03-06 ENCOUNTER — OFFICE VISIT (OUTPATIENT)
Dept: PHYSICAL THERAPY | Facility: CLINIC | Age: 69
End: 2019-03-06
Payer: OTHER MISCELLANEOUS

## 2019-03-06 DIAGNOSIS — M75.121 COMPLETE TEAR OF RIGHT ROTATOR CUFF: Primary | ICD-10-CM

## 2019-03-06 PROCEDURE — 97010 HOT OR COLD PACKS THERAPY: CPT

## 2019-03-06 PROCEDURE — 97110 THERAPEUTIC EXERCISES: CPT

## 2019-03-06 PROCEDURE — 97140 MANUAL THERAPY 1/> REGIONS: CPT

## 2019-03-06 NOTE — PROGRESS NOTES
Daily Note     Today's date: 3/6/2019  Patient name: Jax Hamilton  : 1950  MRN: [id number]  Referring provider: Ish Azul MD  Dx:   Encounter Diagnosis     ICD-10-CM    1  Complete tear of right rotator cuff M75 121                   Subjective: Patient reported a 4/10 on R RTC today  Objective: See treatPrecautions: Right RTC tear, Right hip OA     Daily Treatment Diary      Manual  2/11  2/13  2/15  2/18 2/22 2/25 2/27 3/1 3/    Right shoulder PROM  KK  KK  KK  KK DD MM DD DD SC     Shoulder inf mobs            DD nt    Re-eval                                                                 Exercise Diary  2/11  2/15  2/18 2/22 2/25 2/27 3/1 3 3/6    Pulleys  np  5 min  5' 5' 5' 5' 5' 5' 5'    TB ER  BTB 30x  BTB 30x  BTB 30x  BTB 30x BTB 30x BTB 30x Held for today Lighter band NV BTB  30x    TB IR  BTB 30x   BTB 30x BTB 30x BTB 30x BTB 30x BTB 30x BTB 30x Lighter band NV BTB 30x    TB shoulder ext  BTB 30x  BTB 30x BTB 30x PTB 30x PTB 30x PTB 30x PTB 30x Lighter band NV PTB 30x     TB rows  BTB 30x  BTB 30x BTB 30x BTB 30x BTB 30x PTB 30x Held for today Lighter band NV PTB  30x    Sup wand flex  10x10" 10x10"  10" 10x 10" 10x 10"x10 D/c       Sup wand ER  10x10" 10x10"  10" 10x 10" 10x 10"x10 10"x10 10"x10  nt    Sidelying ER  1# 2x10 np 2# 2x10 2# 2x10 2# 2x10 2# 2x10   1# 2x10    Prone T's  1# 2x10 np  2# 2x10 2# 2x10 2# 2x10 2# 2x10 2# 2x10 Less # NV 2# 2x10    Prone I's  1# 2x10 np 2# 2x10 2# 2x10 2# 2x10 2# 2x10 2# 2x10 Less # NV 2# 2x10    Prone Y's  1# 2x10 np  2# 2x10 2# 2x10 2# 2x10 2# 2x10 2# 2x10 Less # NV 2# 2x10    Stand shld flex  2x10  2x10 2x10 2x10 2x10 2x10 2x10  2x10    UBE 4'f/4' b 4'f/4'b 4'f/4'b  4'f/4'b 4'f/4'b 4'f/4'b  4'f/4'b 4'f/4'b    Doorway ER str   20" x 4 20"x4  20"x4 20"x4 20"x4 20"x4   20" x 4                       Body blade            hold      Stand scap                     ER/IR at 90             hold                                                           Modalities  3/1 3/6           CP prn 10' 9'                                                                Assessment: Tolerated treatment well  Patient demonstrated fatigue post treatment, exhibited good technique with therapeutic exercises and would benefit from continued PT, Some R RTC soreness post treatment session  Pt reported at home he felt good with the PTB with / and Rows but lowered it down for IR and ER to the BTB  Pt determined to trial the 2# with prone exercises and refused to lower to 1# during todays session to see if he would feel better and not have another set back  Denied increased pain with all exercises performed today  Plan: Continue per plan of care

## 2019-03-08 ENCOUNTER — OFFICE VISIT (OUTPATIENT)
Dept: PHYSICAL THERAPY | Facility: CLINIC | Age: 69
End: 2019-03-08
Payer: OTHER MISCELLANEOUS

## 2019-03-08 DIAGNOSIS — M75.121 COMPLETE TEAR OF RIGHT ROTATOR CUFF: Primary | ICD-10-CM

## 2019-03-08 PROCEDURE — 97140 MANUAL THERAPY 1/> REGIONS: CPT | Performed by: PHYSICAL THERAPIST

## 2019-03-08 PROCEDURE — 97110 THERAPEUTIC EXERCISES: CPT | Performed by: PHYSICAL THERAPIST

## 2019-03-08 NOTE — PROGRESS NOTES
Daily Note     Today's date: 3/8/2019  Patient name: Lucy Atkins  : 1950  MRN: [id number]  Referring provider: Casey Degroot MD  Dx:   Encounter Diagnosis     ICD-10-CM    1  Complete tear of right rotator cuff M75 121                   Subjective: Patient reported a 5/10 on R RTC today  States he felt more pain than usual yesterday, is not sure why though  Arrived 5min late for appointment today         Objective: See treatPrecautions: Right RTC tear, Right hip OA     Daily Treatment Diary      Manual  2/11  2/13  2/15  2/18 2/22 2/25 2/27 3/1 3/ 3   Right shoulder PROM  KK  KK  KK  KK DD MM DD DD SC DD    Shoulder inf mobs            DD nt DD, G3   Re-eval                                                                 Exercise Diary  2/11  2/15  2/18 2/22 2/25 2/27 3/1 3 3 38   Pulleys  np  5 min  5' 5' 5' 5' 5' 5' 5' 5'   TB ER  BTB 30x  BTB 30x  BTB 30x  BTB 30x BTB 30x BTB 30x Held for today Lighter band NV BTB  30x GTB, 30x   TB IR  BTB 30x   BTB 30x BTB 30x BTB 30x BTB 30x BTB 30x BTB 30x Lighter band NV BTB 30x GTB, 30x   TB shoulder ext  BTB 30x  BTB 30x BTB 30x PTB 30x PTB 30x PTB 30x PTB 30x Lighter band NV PTB 30x  BTB, 30x   TB rows  BTB 30x  BTB 30x BTB 30x BTB 30x BTB 30x PTB 30x Held for today Lighter band NV PTB  30x BTB, 30x   Sup wand flex  10x10" 10x10"  10" 10x 10" 10x 10"x10 D/c       Sup wand ER  10x10" 10x10"  10" 10x 10" 10x 10"x10 10"x10 10"x10  nt 10"x10   Sidelying ER  1# 2x10 np 2# 2x10 2# 2x10 2# 2x10 2# 2x10   1# 2x10 1# 2x10   Prone T's  1# 2x10 np  2# 2x10 2# 2x10 2# 2x10 2# 2x10 2# 2x10 Less # NV 2# 2x10 1#, 2x10   Prone I's  1# 2x10 np 2# 2x10 2# 2x10 2# 2x10 2# 2x10 2# 2x10 Less # NV 2# 2x10 1#, 2x10   Prone Y's  1# 2x10 np  2# 2x10 2# 2x10 2# 2x10 2# 2x10 2# 2x10 Less # NV 2# 2x10 1#, 2x10   Stand shld flex  2x10  2x10 2x10 2x10 2x10 2x10 2x10  2x10 With cane 2x10   UBE 4'f/4' b 4'f/4'b 4'f/4'b  4'f/4'b 4'f/4'b 4'f/4'b  4'f/4'b 4'f/4'b 4'f/4'b   Doorway ER str  20" x 4 20"x4  20"x4 20"x4 20"x4 20"x4   20" x 4 Hold                      Body blade            hold      Stand scap                     ER/IR at 90             hold                                                           Modalities  3/1 3/6 3/8          CP prn 10' 9'                                                              Pt one on one from 950 to 1035, perform UBE and ice during time not 1 on 1  Assessment: Tolerated treatment well  Decreased intensity of exercises in response to recent increase in pain  Performed well with exercises with only minor pain with flexion with cane, and prone Y's  Had pt perform UBE with decreased speed today  Reported feeling good at end of session with no significant pain increase, demonstrated good technique  Instructed pt to perform HEP with TB levels used in PT today until NV  Would benefit from continued PT  Plan: Continue per plan of care  Only progress exercises intensity if pt demonstrates good response to session with no significant increase in pain following day

## 2019-03-11 ENCOUNTER — OFFICE VISIT (OUTPATIENT)
Dept: PHYSICAL THERAPY | Facility: CLINIC | Age: 69
End: 2019-03-11
Payer: OTHER MISCELLANEOUS

## 2019-03-11 DIAGNOSIS — M75.121 COMPLETE TEAR OF RIGHT ROTATOR CUFF: Primary | ICD-10-CM

## 2019-03-11 PROCEDURE — 97110 THERAPEUTIC EXERCISES: CPT | Performed by: PHYSICAL THERAPIST

## 2019-03-11 PROCEDURE — 97140 MANUAL THERAPY 1/> REGIONS: CPT | Performed by: PHYSICAL THERAPIST

## 2019-03-11 PROCEDURE — 97112 NEUROMUSCULAR REEDUCATION: CPT | Performed by: PHYSICAL THERAPIST

## 2019-03-11 NOTE — PROGRESS NOTES
Daily Note     Today's date: 3/11/2019  Patient name: Jax Hamilton  : 1950  MRN: [id number]  Referring provider: Ish Azul MD  Dx:   Encounter Diagnosis     ICD-10-CM    1  Complete tear of right rotator cuff M75 121                   Subjective: Patient states his shoulder hurts at night when lying down  Feels that his shoulder pain and ROM have regressed slightly recently about 2 weeks ago  Objective: See treatment diary below    Precautions: Right RTC tear, Right hip OA     Daily Treatment Diary      Manual  3/11  2/13  2/15  2/18 2/22 2/25 2/27 3/1 3/6 3/8   Right shoulder PROM JF    Full PROM  KK  KK  KK DD MM DD DD SC DD    Shoulder inf mobs hold             DD nt DD, G3   Re-eval                                                                             Exercise Diary  3/11  2/15  2/18 2/22 2/25 2/27 3/1 3/4 3/6 3/8   Pulleys 5'  5 min  5' 5' 5' 5' 5' 5' 5' 5'   TB ER  BTB 30x  BTB 30x  BTB 30x  BTB 30x BTB 30x BTB 30x Held for today Lighter band NV BTB  30x GTB, 30x   TB IR  BTB 30x   BTB 30x BTB 30x BTB 30x BTB 30x BTB 30x BTB 30x Lighter band NV BTB 30x GTB, 30x   TB shoulder ext  PTB 30x pt  BTB 30x BTB 30x PTB 30x PTB 30x PTB 30x PTB 30x Lighter band NV PTB 30x  BTB, 30x   TB rows  PTB 30x  BTB 30x BTB 30x BTB 30x BTB 30x PTB 30x Held for today Lighter band NV PTB  30x BTB, 30x   Sup wand flex   10x10"  10" 10x 10" 10x 10"x10 D/c           Sup wand ER   10x10"  10" 10x 10" 10x 10"x10 10"x10 10"x10   nt 10"x10   Sidelying ER  2# 2x10 np 2# 2x10 2# 2x10 2# 2x10 2# 2x10     1# 2x10 1# 2x10   Prone T's  2# 2x10 np  2# 2x10 2# 2x10 2# 2x10 2# 2x10 2# 2x10 Less # NV 2# 2x10 1#, 2x10   Prone I's  2# 2x10 np 2# 2x10 2# 2x10 2# 2x10 2# 2x10 2# 2x10 Less # NV 2# 2x10 1#, 2x10   Prone Y's  2# 2x10 np  2# 2x10 2# 2x10 2# 2x10 2# 2x10 2# 2x10 Less # NV 2# 2x10 1#, 2x10   Stand shld flex  2x10  2x10 2x10 2x10 2x10 2x10 2x10   2x10 With cane 2x10   UBE 4'f/4' b 4'f/4'b 4'f/4'b  4'f/4'b 4'f/4'b 4'f/4'b   4'f/4'b 4'f/4'b 4'f/4'b   Doorway ER str  hold 20"x4  20"x4 20"x4 20"x4 20"x4     20" x 4 Hold                            Body blade               hold        Stand scap                        ER/IR at 80                hold                                                              Modalities  3/1 3/6 3/8  3/11               CP prn 10' 9'    10'                                                                                   Assessment: Tolerated treatment well  Patient would benefit from continued PT  Full PROM right shoulder  Pain with ER PROM decreased with posterior relocation of humeral head  Patient requested to increase resistance with TE today  No pain during or after session  Plan: Continue per plan of care

## 2019-03-13 ENCOUNTER — OFFICE VISIT (OUTPATIENT)
Dept: PHYSICAL THERAPY | Facility: CLINIC | Age: 69
End: 2019-03-13
Payer: OTHER MISCELLANEOUS

## 2019-03-13 DIAGNOSIS — M75.121 COMPLETE TEAR OF RIGHT ROTATOR CUFF: Primary | ICD-10-CM

## 2019-03-13 PROCEDURE — 97110 THERAPEUTIC EXERCISES: CPT | Performed by: PHYSICAL THERAPIST

## 2019-03-13 PROCEDURE — 97010 HOT OR COLD PACKS THERAPY: CPT | Performed by: PHYSICAL THERAPIST

## 2019-03-13 NOTE — PROGRESS NOTES
Daily Note     Today's date: 3/13/2019  Patient name: Vinicius Phillips  : 1950  MRN: [id number]  Referring provider: Michelle Coffey MD  Dx:   Encounter Diagnosis     ICD-10-CM    1  Complete tear of right rotator cuff M75 121                   Subjective: Patient reports no increase in pain following previous session  Pt reports he has been compliant with HEP  Objective: See treatment diary below      Assessment: Pt was able to demonstrate AROM this date very close to left upper extremity; however, continues to present with poor scapulohumeral rhythm  Initiated gentle biceps stretch in doorway due to reports of pain along length of bicep  Pt reported mild increase in discomfort following biceps stretch  Plan: Progress treatment as tolerated  Consider adding in scapular PNF and RTC RS and AI  Precautions: Right RTC tear, Right hip OA     Daily Treatment Diary      Manual  3/11 3-13   2/22 2/25 2/27 3/1 3/6 3/8   Right shoulder PROM JF    Full PROM NP- full ROM   DD MM DD DD SC DD    Shoulder inf mobs hold             DD nt DD, G3   Re-eval                                                                             Exercise Diary  3/11 3-13   2/25 2/27 3/1 3/4 3/6 3/8   Pulleys 5' 5'   5' 5' 5' 5' 5' 5'   TB ER  BTB 30x BTB 30 x   BTB 30x BTB 30x Held for today Lighter band NV BTB  30x GTB, 30x   TB IR  BTB 30x BTB 30 x   BTB 30x BTB 30x BTB 30x Lighter band NV BTB 30x GTB, 30x   TB shoulder ext  PTB 30x pt PTB 30 x   PTB 30x PTB 30x PTB 30x Lighter band NV PTB 30x  BTB, 30x   TB rows  PTB 30x PTB 30 x   BTB 30x PTB 30x Held for today Lighter band NV PTB  30x BTB, 30x   Sup wand flex      10"x10 D/c           Sup wand ER      10"x10 10"x10 10"x10   nt 10"x10   Sidelying ER  2# 2x10    2# 2x10 2# 2x10     1# 2x10 1# 2x10   Prone T's  2# 2x10 2# 2 x 10   2# 2x10 2# 2x10 2# 2x10 Less # NV 2# 2x10 1#, 2x10   Prone I's  2# 2x10 2# 3 x 10   2# 2x10 2# 2x10 2# 2x10 Less # NV 2# 2x10 1#, 2x10   Prone Y's  2# 2x10 2# 2 x 10   2# 2x10 2# 2x10 2# 2x10 Less # NV 2# 2x10 1#, 2x10   Stand shld flex  2x10    2x10 2x10 2x10   2x10 With cane 2x10   UBE 4'f/4' b 4'f/4' b   4'f/4'b 4'f/4'b   4'f/4'b 4'f/4'b 4'f/4'b   Doorway ER str  hold    20"x4 20"x4     20" x 4 Hold    Doorway biceps stretch    3 x 30"                  Body blade             hold        Stand scap                        ER/IR at 80                hold                                                              Modalities  3/11 3-13                 CP prn 10' 10'

## 2019-03-15 ENCOUNTER — OFFICE VISIT (OUTPATIENT)
Dept: PHYSICAL THERAPY | Facility: CLINIC | Age: 69
End: 2019-03-15
Payer: OTHER MISCELLANEOUS

## 2019-03-15 DIAGNOSIS — M75.121 COMPLETE TEAR OF RIGHT ROTATOR CUFF: Primary | ICD-10-CM

## 2019-03-15 PROCEDURE — 97110 THERAPEUTIC EXERCISES: CPT | Performed by: PHYSICAL THERAPIST

## 2019-03-15 NOTE — PROGRESS NOTES
Daily Note     Today's date: 3/15/2019  Patient name: Eri Rogers  : 1950  MRN: [id number]  Referring provider: Zenobia Coombs MD  Dx:   Encounter Diagnosis     ICD-10-CM    1  Complete tear of right rotator cuff M75 121                   Subjective: Patient reports no increase in pain following previous session  Pt states he feels as though his muscles were worked more last session during each exercise  Objective: See treatment diary below      Assessment: Progressed resistance in shoulder TB exercises this date with shoulder IR with no pain  TB resistance reduced with ER due to pt not being able to move through full available ROM with previous resistance level  Pt required minimal verbal cues to perform TB exercises maintaining wrist in neutral and avoiding extension  Plan: Progress treatment as tolerated        Precautions: Right RTC tear, Right hip OA     Daily Treatment Diary      Manual  3/11 3-13  3-15   2/22 2/25 2/27 3/1 3/6 3/8   Right shoulder PROM JF   Full PROM NP- full ROM     DD MM DD DD SC DD    Shoulder inf mobs hold             DD nt DD, G3   Re-eval                        AI/RS in 45, 90, 145 and 90/90                                                     Exercise Diary  3/11 3-13  3-15   2/25 2/27 3/1 3/4 3/6 3/8   Pulleys 5' 5'  5'   5' 5' 5' 5' 5' 5'   TB ER  BTB 30x BTB 30 x GTB 3 x 10   BTB 30x BTB 30x Held for today Lighter band NV BTB  30x GTB, 30x   TB IR  BTB 30x BTB 30 x  PTB 30 x   BTB 30x BTB 30x BTB 30x Lighter band NV BTB 30x GTB, 30x   TB shoulder ext  PTB 30x pt PTB 30 x  PTB 30 x   PTB 30x PTB 30x PTB 30x Lighter band NV PTB 30x  BTB, 30x   TB rows  PTB 30x PTB 30 x  PTB 30 x   BTB 30x PTB 30x Held for today Lighter band NV PTB  30x BTB, 30x   Sup wand flex         10"x10 D/c           Sup wand ER         10"x10 10"x10 10"x10   nt 10"x10   Sidelying ER  2# 2x10       2# 2x10 2# 2x10     1# 2x10 1# 2x10   Prone T's  2# 2x10 2# 2 x 10  2# 2 x 10   2# 2x10 2# 2x10 2# 2x10 Less # NV 2# 2x10 1#, 2x10   Prone I's  2# 2x10 2# 3 x 10  2# 3 x 10   2# 2x10 2# 2x10 2# 2x10 Less # NV 2# 2x10 1#, 2x10   Prone Y's  2# 2x10 2# 2 x 10  2# 3 x 10   2# 2x10 2# 2x10 2# 2x10 Less # NV 2# 2x10 1#, 2x10   Supine serratus punches   NV          MB on wall CW/CCW   NV                       Stand shld flex  2x10    2 x 10   2x10 2x10 2x10   2x10 With cane 2x10   Stand shld scap   2 x 10          UBE 4'f/4' b 4'f/4' b  4'f/4' b   4'f/4'b 4'f/4'b   4'f/4'b 4'f/4'b 4'f/4'b    Doorway biceps stretch    3 x 30"  3 x 30"                                                                       Modalities  3/11 3-13                   CP prn 10' 10'

## 2019-03-18 ENCOUNTER — OFFICE VISIT (OUTPATIENT)
Dept: PHYSICAL THERAPY | Facility: CLINIC | Age: 69
End: 2019-03-18
Payer: OTHER MISCELLANEOUS

## 2019-03-18 DIAGNOSIS — M75.121 COMPLETE TEAR OF RIGHT ROTATOR CUFF: Primary | ICD-10-CM

## 2019-03-18 PROCEDURE — 97140 MANUAL THERAPY 1/> REGIONS: CPT

## 2019-03-18 PROCEDURE — 97530 THERAPEUTIC ACTIVITIES: CPT

## 2019-03-18 PROCEDURE — 97110 THERAPEUTIC EXERCISES: CPT

## 2019-03-18 NOTE — PROGRESS NOTES
Daily Note     Today's date: 3/18/2019  Patient name: Cecilia Mukherjee  : 1950  MRN: [id number]  Referring provider: Laura Jackson MD  Dx:   Encounter Diagnosis     ICD-10-CM    1  Complete tear of right rotator cuff M75 121                   Subjective: Pt c/o 4/10 pain in R shoulder today  He states that his pain is always around a 3         Objective: See treatment diary below  Precautions: Right RTC tear, Right hip OA     Daily Treatment Diary      Manual  3/11 3-13  3-15  3/18 2/22 2/25 2/27 3/1 3/6 3/8   Right shoulder PROM JF   Full PROM NP- full ROM    SA DD MM DD DD SC DD    Shoulder inf mobs hold             DD nt DD, G3   Re-eval                        AI/RS in 45, 90, 145 and 90/90                                                     Exercise Diary  3/11 3-13  3-15  3/18 2/25 2/27 3/1 3/4 3/6 3/8   Pulleys 5' 5'  5'  5' 5' 5' 5' 5' 5' 5'   TB ER  BTB 30x BTB 30 x GTB 3 x 10  BTB 30x BTB 30x BTB 30x Held for today Lighter band NV BTB  30x GTB, 30x   TB IR  BTB 30x BTB 30 x  PTB 30 x  PTB 30x BTB 30x BTB 30x BTB 30x Lighter band NV BTB 30x GTB, 30x   TB shoulder ext  PTB 30x pt PTB 30 x  PTB 30 x  PTB 30x PTB 30x PTB 30x PTB 30x Lighter band NV PTB 30x  BTB, 30x   TB rows  PTB 30x PTB 30 x  PTB 30 x  PTB 30x BTB 30x PTB 30x Held for today Lighter band NV PTB  30x BTB, 30x   Sup wand flex         10"x10 D/c           Sup wand ER         10"x10 10"x10 10"x10   nt 10"x10   Sidelying ER  2# 2x10      2# 20x 2# 2x10 2# 2x10     1# 2x10 1# 2x10   Prone T's  2# 2x10 2# 2 x 10  2# 2 x 10  2# 20x 2# 2x10 2# 2x10 2# 2x10 Less # NV 2# 2x10 1#, 2x10   Prone I's  2# 2x10 2# 3 x 10  2# 3 x 10  2# 30x 2# 2x10 2# 2x10 2# 2x10 Less # NV 2# 2x10 1#, 2x10   Prone Y's  2# 2x10 2# 2 x 10  2# 3 x 10  2# 30x 2# 2x10 2# 2x10 2# 2x10 Less # NV 2# 2x10 1#, 2x10   Supine serratus punches     NV  2# 30x               MB on wall CW/CCW     NV  yellow 20x                                       Stand shld flex  2x10    2 x 10  20x 2x10 2x10 2x10   2x10 With cane 2x10   Stand shld scap     2 x 10  20x               UBE 4'f/4' b 4'f/4' b  4'f/4' b  4'f/4'b 4'f/4'b 4'f/4'b   4'f/4'b 4'f/4'b 4'f/4'b    Doorway biceps stretch    3 x 30"  3 x 30"  30" 3x                                                                     Modalities  3/11 3-13                   CP prn 10' 10'                                                                          Assessment: Pt had no change in pain throughout nor post treatment today despite noting feeling less tension  His ROM is improving despite continuing to have discomfort during PROM  He tolerated increase in intensity well without an increase in pain  He was advised to continue with HEP including AROM to decrease pain with ROM  Plan: Progress treatment as tolerated

## 2019-03-19 ENCOUNTER — OFFICE VISIT (OUTPATIENT)
Dept: FAMILY MEDICINE CLINIC | Facility: CLINIC | Age: 69
End: 2019-03-19
Payer: COMMERCIAL

## 2019-03-19 ENCOUNTER — TELEPHONE (OUTPATIENT)
Dept: FAMILY MEDICINE CLINIC | Facility: CLINIC | Age: 69
End: 2019-03-19

## 2019-03-19 VITALS
TEMPERATURE: 98.5 F | SYSTOLIC BLOOD PRESSURE: 162 MMHG | HEART RATE: 98 BPM | OXYGEN SATURATION: 99 % | BODY MASS INDEX: 24.18 KG/M2 | HEIGHT: 73 IN | DIASTOLIC BLOOD PRESSURE: 84 MMHG | WEIGHT: 182.4 LBS

## 2019-03-19 DIAGNOSIS — R73.01 IMPAIRED FASTING GLUCOSE: ICD-10-CM

## 2019-03-19 DIAGNOSIS — I15.8 OTHER SECONDARY HYPERTENSION: ICD-10-CM

## 2019-03-19 DIAGNOSIS — M75.121 COMPLETE TEAR OF RIGHT ROTATOR CUFF: Primary | ICD-10-CM

## 2019-03-19 PROCEDURE — 99214 OFFICE O/P EST MOD 30 MIN: CPT | Performed by: NURSE PRACTITIONER

## 2019-03-19 RX ORDER — AMLODIPINE BESYLATE 5 MG/1
5 TABLET ORAL DAILY
Qty: 30 TABLET | Refills: 0 | Status: SHIPPED | OUTPATIENT
Start: 2019-03-19 | End: 2019-03-27 | Stop reason: ALTCHOICE

## 2019-03-19 RX ORDER — METHOCARBAMOL 500 MG/1
500 TABLET, FILM COATED ORAL 4 TIMES DAILY
Qty: 30 TABLET | Refills: 0 | Status: SHIPPED | OUTPATIENT
Start: 2019-03-19 | End: 2019-03-27 | Stop reason: SDUPTHER

## 2019-03-19 NOTE — TELEPHONE ENCOUNTER
Patient had an appointment and upon checking out  He requested if you can please fill out another handicap placard for him and his wife Sarah Mcgovern  He stated that he had lost the original forms that were given to him

## 2019-03-19 NOTE — PROGRESS NOTES
Assessment/Plan:    Impaired fasting glucose  Will check a fasting glucose level    Other secondary hypertension  Norvasc 5 mg daily monitor blood pressure when resting maintain low-salt diet    Complete tear of right rotator cuff  Continue physical therapy treatment and follow-up with Orthopedics  Take Robaxin as needed  Use heat and ice as needed  Problem List Items Addressed This Visit        Endocrine    Impaired fasting glucose     Will check a fasting glucose level            Cardiovascular and Mediastinum    Other secondary hypertension     Norvasc 5 mg daily monitor blood pressure when resting maintain low-salt diet         Relevant Medications    amLODIPine (NORVASC) 5 mg tablet    Other Relevant Orders    CBC and differential    Lipid panel    Comprehensive metabolic panel    TSH, 3rd generation with Free T4 reflex    Microalbumin / creatinine urine ratio       Musculoskeletal and Integument    Complete tear of right rotator cuff - Primary     Continue physical therapy treatment and follow-up with Orthopedics  Take Robaxin as needed  Use heat and ice as needed  Relevant Medications    methocarbamol (ROBAXIN) 500 mg tablet            Subjective:      Patient ID: Jamey Woodard  is a 80 y o  male  Patient is here with complaints of elevated blood pressure readings  States he has multiple stresses and when he checks his pressures sometimes it is 170/80 I neck changes with each arm  Patient is currently a   He said he is also taking care of a 80year-old p r n     And his wife is also ill  His also have scheduled to have a skin biopsy o off a lesion on his nose on Tuesday  Has a rotator cuff tear that has been problematic for him  He is currently getting physical therapy  Pain sometimes is very difficult to manage  He takes oxycodone as needed he also uses Mobic, heat and ice  Patient states he is unable to sleep at times because of the pain        The following portions of the patient's history were reviewed and updated as appropriate: allergies, current medications, past family history, past medical history, past social history, past surgical history and problem list     Review of Systems   Constitutional: Negative  HENT: Negative  Eyes: Negative  Respiratory: Negative  Cardiovascular: Negative  Gastrointestinal: Negative  Endocrine: Negative  Genitourinary: Negative  Musculoskeletal: Negative  Skin: Negative  Allergic/Immunologic: Negative  Neurological: Negative  Hematological: Negative  Psychiatric/Behavioral: Negative  Objective:      /84   Pulse 98   Temp 98 5 °F (36 9 °C) (Tympanic)   Ht 6' 1" (1 854 m)   Wt 82 7 kg (182 lb 6 4 oz)   SpO2 99%   BMI 24 06 kg/m²          Physical Exam   Constitutional: He is oriented to person, place, and time  He appears well-developed and well-nourished  HENT:   Head: Normocephalic and atraumatic  Eyes: Pupils are equal, round, and reactive to light  Neck: Normal range of motion  Cardiovascular: Normal rate and regular rhythm  Pulmonary/Chest: Effort normal and breath sounds normal    Musculoskeletal: Normal range of motion  Neurological: He is oriented to person, place, and time  Skin: Skin is warm and dry  Psychiatric: He has a normal mood and affect  His behavior is normal  Judgment and thought content normal    Nursing note and vitals reviewed          Labs:

## 2019-03-19 NOTE — PATIENT INSTRUCTIONS
Obtain fasting labs  Do not eat anything after midnight   May drink water  Take norvasc daily  Check blood pressure daily, when relaxed  Use Robaxin as needed at night for pain

## 2019-03-20 ENCOUNTER — OFFICE VISIT (OUTPATIENT)
Dept: PHYSICAL THERAPY | Facility: CLINIC | Age: 69
End: 2019-03-20
Payer: OTHER MISCELLANEOUS

## 2019-03-20 DIAGNOSIS — M75.121 COMPLETE TEAR OF RIGHT ROTATOR CUFF: Primary | ICD-10-CM

## 2019-03-20 PROCEDURE — 97112 NEUROMUSCULAR REEDUCATION: CPT | Performed by: PHYSICAL THERAPIST

## 2019-03-20 PROCEDURE — 97010 HOT OR COLD PACKS THERAPY: CPT | Performed by: PHYSICAL THERAPIST

## 2019-03-20 PROCEDURE — 97110 THERAPEUTIC EXERCISES: CPT | Performed by: PHYSICAL THERAPIST

## 2019-03-20 NOTE — ASSESSMENT & PLAN NOTE
Continue physical therapy treatment and follow-up with Orthopedics  Take Robaxin as needed  Use heat and ice as needed

## 2019-03-20 NOTE — PROGRESS NOTES
Daily Note     Today's date: 3/20/2019  Patient name: Robert Rankin  : 1950  MRN: [id number]  Referring provider: Elvis Fajardo MD  Dx:   Encounter Diagnosis     ICD-10-CM    1  Complete tear of right rotator cuff M75 121                   Subjective: Patient reports no increased soreness following previous session  Objective: See treatment diary below      Assessment: Initiated alternating isometrics and rhythmic stabilization manually this date to enhance RTC neuromuscular control and strength  Pt demonstrated most difficulty maintaining stability in overhead positions  Pt was able to perform remainder of exercises with minimal complaints of pain; however, reports of fatigue  Mod verbal and tactile cues required to perform serratus punches with correct form this date  Plan: Progress treatment as tolerated          Precautions: Right RTC tear, Right hip OA     Daily Treatment Diary      Manual  3/11 3-  3-15  3/18 3-20        Right shoulder PROM JF   Full PROM NP- full ROM    SA          Shoulder inf mobs hold               Re-eval                  AI/RS in 45, 90, 145 and 90/90         JG 15" x 2 ea                                      Exercise Diary  3/11 3-  3-15  3/18 3-20        Pulleys 5' 5'  5'  5' 5'        TB ER  BTB 30x BTB 30 x GTB 3 x 10  BTB 30x NV        TB IR  BTB 30x BTB 30 x  PTB 30 x  PTB 30x NV        TB shoulder ext  PTB 30x pt PTB 30 x  PTB 30 x  PTB 30x NV        TB rows  PTB 30x PTB 30 x  PTB 30 x  PTB 30x NV        Sup wand flex                 Sup wand ER                 Sidelying ER  2# 2x10      2# 20x 2# 2 x 10        Prone T's  2# 2x10 2# 2 x 10  2# 2 x 10  2# 20x 2# 2 x 10        Prone I's  2# 2x10 2# 3 x 10  2# 3 x 10  2# 30x 2# 3 x 10        Prone Y's  2# 2x10 2# 2 x 10  2# 3 x 10  2# 30x 2# 3 x 10        Supine serratus punches     NV  2# 30x 2# 2 x 10        MB on wall CW/CCW     NV  yellow 20x yellow 20x                          Stand shld flex  2x10    2 x 10  20x 2 x 10        Stand shld scap     2 x 10  20x 2 x 10        UBE 4'f/4' b 4'f/4' b  4'f/4' b  4'f/4'b 4'f/4'b         Doorway biceps stretch    3 x 30"  3 x 30"  30" 3x 30" 3x                                                                Modalities  3/11 3-13    3-20               CP prn 10' 10'    10'

## 2019-03-22 ENCOUNTER — OFFICE VISIT (OUTPATIENT)
Dept: PHYSICAL THERAPY | Facility: CLINIC | Age: 69
End: 2019-03-22
Payer: OTHER MISCELLANEOUS

## 2019-03-22 ENCOUNTER — APPOINTMENT (OUTPATIENT)
Dept: LAB | Facility: HOSPITAL | Age: 69
End: 2019-03-22
Payer: COMMERCIAL

## 2019-03-22 DIAGNOSIS — M75.121 COMPLETE TEAR OF RIGHT ROTATOR CUFF: Primary | ICD-10-CM

## 2019-03-22 DIAGNOSIS — I15.8 OTHER SECONDARY HYPERTENSION: ICD-10-CM

## 2019-03-22 LAB
ALBUMIN SERPL BCP-MCNC: 3.7 G/DL (ref 3.5–5)
ALP SERPL-CCNC: 158 U/L (ref 46–116)
ALT SERPL W P-5'-P-CCNC: 61 U/L (ref 12–78)
ANION GAP SERPL CALCULATED.3IONS-SCNC: 7 MMOL/L (ref 4–13)
AST SERPL W P-5'-P-CCNC: 25 U/L (ref 5–45)
BASOPHILS # BLD AUTO: 0.05 THOUSANDS/ΜL (ref 0–0.1)
BASOPHILS NFR BLD AUTO: 1 % (ref 0–1)
BILIRUB SERPL-MCNC: 0.9 MG/DL (ref 0.2–1)
BUN SERPL-MCNC: 27 MG/DL (ref 5–25)
CALCIUM SERPL-MCNC: 9 MG/DL (ref 8.3–10.1)
CHLORIDE SERPL-SCNC: 105 MMOL/L (ref 100–108)
CHOLEST SERPL-MCNC: 159 MG/DL (ref 50–200)
CO2 SERPL-SCNC: 28 MMOL/L (ref 21–32)
CREAT SERPL-MCNC: 1.27 MG/DL (ref 0.6–1.3)
CREAT UR-MCNC: 232 MG/DL
EOSINOPHIL # BLD AUTO: 0.14 THOUSAND/ΜL (ref 0–0.61)
EOSINOPHIL NFR BLD AUTO: 2 % (ref 0–6)
ERYTHROCYTE [DISTWIDTH] IN BLOOD BY AUTOMATED COUNT: 13.1 % (ref 11.6–15.1)
GFR SERPL CREATININE-BSD FRML MDRD: 58 ML/MIN/1.73SQ M
GLUCOSE P FAST SERPL-MCNC: 96 MG/DL (ref 65–99)
HCT VFR BLD AUTO: 41.5 % (ref 36.5–49.3)
HDLC SERPL-MCNC: 58 MG/DL (ref 40–60)
HGB BLD-MCNC: 14.4 G/DL (ref 12–17)
IMM GRANULOCYTES # BLD AUTO: 0.03 THOUSAND/UL (ref 0–0.2)
IMM GRANULOCYTES NFR BLD AUTO: 0 % (ref 0–2)
LDLC SERPL CALC-MCNC: 87 MG/DL (ref 0–100)
LYMPHOCYTES # BLD AUTO: 1.49 THOUSANDS/ΜL (ref 0.6–4.47)
LYMPHOCYTES NFR BLD AUTO: 21 % (ref 14–44)
MCH RBC QN AUTO: 32.3 PG (ref 26.8–34.3)
MCHC RBC AUTO-ENTMCNC: 34.7 G/DL (ref 31.4–37.4)
MCV RBC AUTO: 93 FL (ref 82–98)
MICROALBUMIN UR-MCNC: 16.8 MG/L (ref 0–20)
MICROALBUMIN/CREAT 24H UR: 7 MG/G CREATININE (ref 0–30)
MONOCYTES # BLD AUTO: 0.85 THOUSAND/ΜL (ref 0.17–1.22)
MONOCYTES NFR BLD AUTO: 12 % (ref 4–12)
NEUTROPHILS # BLD AUTO: 4.45 THOUSANDS/ΜL (ref 1.85–7.62)
NEUTS SEG NFR BLD AUTO: 64 % (ref 43–75)
NONHDLC SERPL-MCNC: 101 MG/DL
NRBC BLD AUTO-RTO: 0 /100 WBCS
PLATELET # BLD AUTO: 186 THOUSANDS/UL (ref 149–390)
PMV BLD AUTO: 10.2 FL (ref 8.9–12.7)
POTASSIUM SERPL-SCNC: 4 MMOL/L (ref 3.5–5.3)
PROT SERPL-MCNC: 7.3 G/DL (ref 6.4–8.2)
RBC # BLD AUTO: 4.46 MILLION/UL (ref 3.88–5.62)
SODIUM SERPL-SCNC: 140 MMOL/L (ref 136–145)
TRIGL SERPL-MCNC: 71 MG/DL
TSH SERPL DL<=0.05 MIU/L-ACNC: 2.92 UIU/ML (ref 0.36–3.74)
WBC # BLD AUTO: 7.01 THOUSAND/UL (ref 4.31–10.16)

## 2019-03-22 PROCEDURE — 85025 COMPLETE CBC W/AUTO DIFF WBC: CPT

## 2019-03-22 PROCEDURE — 84443 ASSAY THYROID STIM HORMONE: CPT

## 2019-03-22 PROCEDURE — 97110 THERAPEUTIC EXERCISES: CPT | Performed by: PHYSICAL THERAPIST

## 2019-03-22 PROCEDURE — 97010 HOT OR COLD PACKS THERAPY: CPT | Performed by: PHYSICAL THERAPIST

## 2019-03-22 PROCEDURE — 80061 LIPID PANEL: CPT

## 2019-03-22 PROCEDURE — 97112 NEUROMUSCULAR REEDUCATION: CPT | Performed by: PHYSICAL THERAPIST

## 2019-03-22 PROCEDURE — 82043 UR ALBUMIN QUANTITATIVE: CPT | Performed by: NURSE PRACTITIONER

## 2019-03-22 PROCEDURE — 80053 COMPREHEN METABOLIC PANEL: CPT

## 2019-03-22 PROCEDURE — 36415 COLL VENOUS BLD VENIPUNCTURE: CPT

## 2019-03-22 PROCEDURE — 82570 ASSAY OF URINE CREATININE: CPT | Performed by: NURSE PRACTITIONER

## 2019-03-22 NOTE — PROGRESS NOTES
Daily Note     Today's date: 3/22/2019  Patient name: Julian Hopkins  : 1950  MRN: [id number]  Referring provider: Kimberli Flores MD  Dx:   Encounter Diagnosis     ICD-10-CM    1  Complete tear of right rotator cuff M75 121                   Subjective: Patient denies any increase in pain/discomfort following previous session more than usual        Objective: See treatment diary below      Assessment: Resumed full POC this date including theraband  Pt was able to tolerate all exercises this date with minimal complaints of pain/discomfort  Pt required moderate verbal cues to perform MB wall ball correctly and to maintain adan shoulder during AI and RS  Plan: Progress treatment as tolerated          Precautions: Right RTC tear, Right hip OA     Daily Treatment Diary      Manual  3/11 3-13  3-15  3/18 3-20  3-22           Right shoulder PROM JF   Full PROM NP- full ROM    SA                Shoulder inf mobs hold                     Re-eval                        AI/RS in 45, 90, 145 and 90/90         JG 15" x 2 ea  JG 15" x 2 ea                                         Exercise Diary  3/11 3-13  3-15  3/18 3-20  3-22           Pulleys 5' 5'  5'  5' 5'  DC           TB ER  BTB 30x BTB 30 x GTB 3 x 10  BTB 30x NV  PTB 30x           TB IR  BTB 30x BTB 30 x  PTB 30 x  PTB 30x NV  PTB 30x           TB shoulder ext  PTB 30x pt PTB 30 x  PTB 30 x  PTB 30x NV  PTB 30x           TB rows  PTB 30x PTB 30 x  PTB 30 x  PTB 30x NV  PTB 30x           Sup wand flex                       Sup wand ER                       Sidelying ER  2# 2x10      2# 20x 2# 2 x 10   2# 3 x 10           Prone T's  2# 2x10 2# 2 x 10  2# 2 x 10  2# 20x 2# 2 x 10  2# 3 x 10           Prone I's  2# 2x10 2# 3 x 10  2# 3 x 10  2# 30x 2# 3 x 10  2# 3 x 10           Prone Y's  2# 2x10 2# 2 x 10  2# 3 x 10  2# 30x 2# 3 x 10  2# 2 x 10           Supine serratus punches     NV  2# 30x 2# 2 x 10  2# 2 x 10           MB on wall CW/CCW     NV  yellow 20x yellow 20x  yellow 20x                                   Stand shld flex  2x10    2 x 10  20x 2 x 10  NV           Stand shld scap     2 x 10  20x 2 x 10  NV           UBE 4'f/4' b 4'f/4' b  4'f/4' b  4'f/4'b 4'f/4'b  4'f/4'b            Doorway biceps stretch    3 x 30"  3 x 30"  30" 3x 30" 3x  30" 3x                                                                 Modalities  3/11 3-13    3-20  3-22             CP prn 10' 10'    10'  10'

## 2019-03-25 ENCOUNTER — OFFICE VISIT (OUTPATIENT)
Dept: PHYSICAL THERAPY | Facility: CLINIC | Age: 69
End: 2019-03-25
Payer: OTHER MISCELLANEOUS

## 2019-03-25 DIAGNOSIS — M75.121 COMPLETE TEAR OF RIGHT ROTATOR CUFF: Primary | ICD-10-CM

## 2019-03-25 PROCEDURE — 97110 THERAPEUTIC EXERCISES: CPT

## 2019-03-25 PROCEDURE — 97112 NEUROMUSCULAR REEDUCATION: CPT

## 2019-03-25 NOTE — PROGRESS NOTES
Daily Note     Today's date: 3/25/2019  Patient name: Jax Hamilton  : 1950  MRN: [id number]  Referring provider: Ish Azul MD  Dx:   Encounter Diagnosis     ICD-10-CM    1  Complete tear of right rotator cuff M75 121                   Subjective: Pt c/o 4/10 pain in R shoulder today  He states that last night his pain was up to an 8        Objective: See treatment diary below  Precautions: Right RTC tear, Right hip OA     Daily Treatment Diary      Manual  3/11 3-13  3-15  3/18 3-20  3-22           Right shoulder PROM JF   Full PROM NP- full ROM    SA                Shoulder inf mobs hold                     Re-eval                        AI/RS in 45, 90, 145 and 90/90         JG 15" x 2 ea  JG 15" x 2 ea                                         Exercise Diary  3/11 3-13  3-15  3/18 3-20  3-22  3/24  3/25       Pulleys 5' 5'  5'  5' 5'  DC  DC  DC       TB ER  BTB 30x BTB 30 x GTB 3 x 10  BTB 30x NV  PTB 30x  PTB 30x  PTB 30x       TB IR  BTB 30x BTB 30 x  PTB 30 x  PTB 30x NV  PTB 30x  PTB 30x  PTB 30x       TB shoulder ext  PTB 30x pt PTB 30 x  PTB 30 x  PTB 30x NV  PTB 30x  PTB 30x  PTB 30x       TB rows  PTB 30x PTB 30 x  PTB 30 x  PTB 30x NV  PTB 30x  PTB 30x  PTB 30x       Sup wand flex                       Sup wand ER                       Sidelying ER  2# 2x10      2# 20x 2# 2 x 10   2# 3 x 10  2# 30x  2# 30x       Prone T's  2# 2x10 2# 2 x 10  2# 2 x 10  2# 20x 2# 2 x 10  2# 3 x 10  2# 30x  2# 30x       Prone I's  2# 2x10 2# 3 x 10  2# 3 x 10  2# 30x 2# 3 x 10  2# 3 x 10  2# 30x  2# 30x       Prone Y's  2# 2x10 2# 2 x 10  2# 3 x 10  2# 30x 2# 3 x 10  2# 2 x 10  2# 30x  2# 30x       Supine serratus punches     NV  2# 30x 2# 2 x 10  2# 2 x 10  2# 20x  2# 30x       MB on wall CW/CCW     NV  yellow 20x yellow 20x  yellow 20x  yellow 20x  yellow 20x                               Stand shld flex  2x10    2 x 10  20x 2 x 10  NV  20x  20x       Stand shld scap     2 x 10  20x 2 x 10  NV  20x  20x       UBE 4'f/4' b 4'f/4' b  4'f/4' b  4'f/4'b 4'f/4'b  4'f/4'b  4'f/4'b  4'f/4'b        Doorway biceps stretch    3 x 30"  3 x 30"  30" 3x 30" 3x  30" 3x  30" 3x  30" 3x                                                             Modalities  3/11 3-13    3-20  3-22  3/25           CP prn 10' 10'    10'  10'  10'                                                                  Assessment: Pt had no pain post treatment today despite noting some soreness on the top of his R shoulder  He was educated on anatomy and symptoms of a RTC tear  Increase weight at next visit to continue to challenge pt's shoulder and increase strength  Plan: Progress treatment as tolerated

## 2019-03-27 ENCOUNTER — OFFICE VISIT (OUTPATIENT)
Dept: FAMILY MEDICINE CLINIC | Facility: CLINIC | Age: 69
End: 2019-03-27
Payer: COMMERCIAL

## 2019-03-27 ENCOUNTER — APPOINTMENT (OUTPATIENT)
Dept: PHYSICAL THERAPY | Facility: CLINIC | Age: 69
End: 2019-03-27
Payer: OTHER MISCELLANEOUS

## 2019-03-27 VITALS
WEIGHT: 186 LBS | DIASTOLIC BLOOD PRESSURE: 80 MMHG | HEART RATE: 84 BPM | OXYGEN SATURATION: 98 % | SYSTOLIC BLOOD PRESSURE: 122 MMHG | RESPIRATION RATE: 18 BRPM | HEIGHT: 73 IN | BODY MASS INDEX: 24.65 KG/M2 | TEMPERATURE: 97.8 F

## 2019-03-27 DIAGNOSIS — M75.121 COMPLETE TEAR OF RIGHT ROTATOR CUFF: ICD-10-CM

## 2019-03-27 DIAGNOSIS — I15.8 OTHER SECONDARY HYPERTENSION: Primary | ICD-10-CM

## 2019-03-27 PROCEDURE — 99213 OFFICE O/P EST LOW 20 MIN: CPT | Performed by: NURSE PRACTITIONER

## 2019-03-27 PROCEDURE — 1160F RVW MEDS BY RX/DR IN RCRD: CPT | Performed by: NURSE PRACTITIONER

## 2019-03-27 PROCEDURE — 3008F BODY MASS INDEX DOCD: CPT | Performed by: NURSE PRACTITIONER

## 2019-03-27 RX ORDER — AMLODIPINE BESYLATE 5 MG/1
5 TABLET ORAL DAILY
Qty: 2 TABLET | Refills: 3 | Status: SHIPPED | OUTPATIENT
Start: 2019-03-27 | End: 2019-04-03 | Stop reason: DRUGHIGH

## 2019-03-27 RX ORDER — METHOCARBAMOL 500 MG/1
500 TABLET, FILM COATED ORAL 4 TIMES DAILY
Qty: 30 TABLET | Refills: 0 | Status: SHIPPED | OUTPATIENT
Start: 2019-03-27 | End: 2019-04-12 | Stop reason: SDUPTHER

## 2019-03-27 NOTE — ASSESSMENT & PLAN NOTE
Continue with physical therapy and orthopedic follow-up  Continue to use heat and ice as needed    Use Robaxin every 4 hours as needed for pain

## 2019-03-27 NOTE — PATIENT INSTRUCTIONS
Continue to maintain healthy diet  Reviewed labs  Continue all meds as ordered   Continue physical therapy  Follow up with orthopedic

## 2019-03-27 NOTE — ASSESSMENT & PLAN NOTE
Continue taking 5 mg of Norvasc  Continue heart healthy diet  Reviewed cholesterol labs with patient  Patient stated he has worked hard to get his cholesterol down  Was pleased that was within normal limits  Encouraged continued diet and exercise    He continues to report stresses of taking care of ill parent

## 2019-03-27 NOTE — PROGRESS NOTES
Assessment/Plan:    Other secondary hypertension  Continue taking 5 mg of Norvasc  Continue heart healthy diet  Reviewed cholesterol labs with patient  Patient stated he has worked hard to get his cholesterol down  Was pleased that was within normal limits  Encouraged continued diet and exercise  He continues to report stresses of taking care of ill parent      Complete tear of right rotator cuff  Continue with physical therapy and orthopedic follow-up  Continue to use heat and ice as needed  Use Robaxin every 4 hours as needed for pain         Problem List Items Addressed This Visit        Cardiovascular and Mediastinum    Other secondary hypertension - Primary     Continue taking 5 mg of Norvasc  Continue heart healthy diet  Reviewed cholesterol labs with patient  Patient stated he has worked hard to get his cholesterol down  Was pleased that was within normal limits  Encouraged continued diet and exercise  He continues to report stresses of taking care of ill parent           Relevant Medications    amLODIPine (NORVASC) 5 mg tablet       Musculoskeletal and Integument    Complete tear of right rotator cuff     Continue with physical therapy and orthopedic follow-up  Continue to use heat and ice as needed  Use Robaxin every 4 hours as needed for pain         Relevant Medications    methocarbamol (ROBAXIN) 500 mg tablet            Subjective:      Patient ID: Beatriz Sofia  is a 80 y o  male  Patient is here to review his lab work that was done  Has been taking his blood pressure pill daily and has been monitoring his blood pressure  Pressures range from 1 03E to 338X systolic and 07I to mid 89C diastolic  Patient reports feeling well  Had and dermatological procedure done yesterday, a lesion on his nose was removed and grafted  Patient reports procedure was well feels good    Patient is going to be losing his medical insurance, because he has been out sick with a rotator cuff injury, that is workman's comp   His employer told him He will lose his insurance after April 1st   So he is trying to get his visits in as to manage his care      The following portions of the patient's history were reviewed and updated as appropriate: allergies, current medications, past family history, past medical history, past social history, past surgical history and problem list     Review of Systems   Constitutional: Negative  HENT: Negative  Eyes: Negative  Respiratory: Negative  Cardiovascular: Negative  Gastrointestinal: Negative  Endocrine: Negative  Genitourinary: Negative  Musculoskeletal: Negative  Skin: Negative  Lesion removal and skin graft done yesterday  Dressing is dry clean and intact  Allergic/Immunologic: Negative  Neurological: Negative  Hematological: Negative  Psychiatric/Behavioral: Negative  Objective:      /80   Pulse 84   Temp 97 8 °F (36 6 °C)   Resp 18   Ht 6' 1" (1 854 m)   Wt 84 4 kg (186 lb)   SpO2 98%   BMI 24 54 kg/m²          Physical Exam   Constitutional: He is oriented to person, place, and time  He appears well-developed and well-nourished  HENT:   Head: Normocephalic and atraumatic  Eyes: Pupils are equal, round, and reactive to light  Neck: Normal range of motion  Cardiovascular: Normal rate and regular rhythm  Pulmonary/Chest: Effort normal and breath sounds normal    Musculoskeletal: Normal range of motion  He exhibits tenderness (Right rotator cuff, patient is receiving physical therapy, is following up with Orthopedics  )  Neurological: He is oriented to person, place, and time  Skin: Skin is warm and dry  Lesion on nose repaired, dressing dry and intact  Patient is following up with Dermatology  Psychiatric: He has a normal mood and affect  His behavior is normal  Judgment and thought content normal    Nursing note and vitals reviewed          Labs:    Lab Results   Component Value Date WBC 7 01 03/22/2019    HGB 14 4 03/22/2019    HCT 41 5 03/22/2019    MCV 93 03/22/2019     03/22/2019     Lab Results   Component Value Date    K 4 0 03/22/2019     03/22/2019    CO2 28 03/22/2019    BUN 27 (H) 03/22/2019    CREATININE 1 27 03/22/2019    GLUF 96 03/22/2019    CALCIUM 9 0 03/22/2019    AST 25 03/22/2019    ALT 61 03/22/2019    ALKPHOS 158 (H) 03/22/2019    EGFR 58 03/22/2019     Lab Results   Component Value Date    CALCIUM 9 0 03/22/2019    K 4 0 03/22/2019    CO2 28 03/22/2019     03/22/2019    BUN 27 (H) 03/22/2019    CREATININE 1 27 03/22/2019

## 2019-03-28 ENCOUNTER — OFFICE VISIT (OUTPATIENT)
Dept: PHYSICAL THERAPY | Facility: CLINIC | Age: 69
End: 2019-03-28
Payer: OTHER MISCELLANEOUS

## 2019-03-28 DIAGNOSIS — M75.121 COMPLETE TEAR OF RIGHT ROTATOR CUFF: Primary | ICD-10-CM

## 2019-03-28 PROCEDURE — 97110 THERAPEUTIC EXERCISES: CPT

## 2019-03-28 PROCEDURE — 97112 NEUROMUSCULAR REEDUCATION: CPT

## 2019-03-28 NOTE — PROGRESS NOTES
Daily Note     Today's date: 3/28/2019  Patient name: Mariel Allan  : 1950  MRN: [id number]  Referring provider: Addis Caballero MD  Dx:   Encounter Diagnosis     ICD-10-CM    1  Complete tear of right rotator cuff M75 121                   Subjective: Pt c/o 4/10 pain in R shoulder today  He states that his pain is always the same         Objective: See treatment diary below  Precautions: Right RTC tear, Right hip OA     Daily Treatment Diary      Manual  3/11 3-13  3-15  3/18 3-20  3-22           Right shoulder PROM JF   Full PROM NP- full ROM    SA                Shoulder inf mobs hold                     Re-eval                        AI/RS in 45, 90, 145 and 90/90         JG 15" x 2 ea  JG 15" x 2 ea                                         Exercise Diary  3/11 3-13  3-15  3/18 3-20  3-22  3/24  3/25  3/28     Pulleys 5' 5'  5'  5' 5' [address]  DC  DC  DC     TB ER  BTB 30x BTB 30 x GTB 3 x 10  BTB 30x NV  PTB 30x  PTB 30x  PTB 30x  PTB 30x     TB IR  BTB 30x BTB 30 x  PTB 30 x  PTB 30x NV  PTB 30x  PTB 30x  PTB 30x  PTB 30x     TB shoulder ext  PTB 30x pt PTB 30 x  PTB 30 x  PTB 30x NV  PTB 30x  PTB 30x  PTB 30x  PTB 30x     TB rows  PTB 30x PTB 30 x  PTB 30 x  PTB 30x NV  PTB 30x  PTB 30x  PTB 30x  PTB 30x     Sup wand flex                       Sup wand ER                       Sidelying ER  2# 2x10      2# 20x 2# 2 x 10   2# 3 x 10  2# 30x  2# 30x  2# 30x     Prone T's  2# 2x10 2# 2 x 10  2# 2 x 10  2# 20x 2# 2 x 10  2# 3 x 10  2# 30x  2# 30x  2# 30x     Prone I's  2# 2x10 2# 3 x 10  2# 3 x 10  2# 30x 2# 3 x 10  2# 3 x 10  2# 30x  2# 30x  2# 30x     Prone Y's  2# 2x10 2# 2 x 10  2# 3 x 10  2# 30x 2# 3 x 10  2# 2 x 10  2# 30x  2# 30x  2# 30x     Supine serratus punches     NV  2# 30x 2# 2 x 10  2# 2 x 10  2# 20x  2# 30x  2# 30x     MB on wall CW/CCW     NV  yellow 20x yellow 20x  yellow 20x  yellow 20x  yellow 20x  yellow 20x                             Stand shld flex  2x10    2 x 10  20x 2 x 10  NV  20x  20x  20x     Stand shld scap     2 x 10  20x 2 x 10  NV  20x  20x  20x     UBE 4'f/4' b 4'f/4' b  4'f/4' b  4'f/4'b 4'f/4'b  4'f/4'b  4'f/4'b  4'f/4'b  4'f/4'b      Doorway biceps stretch    3 x 30"  3 x 30"  30" 3x 30" 3x  30" 3x  30" 3x  30" 3x  30" 3x                                                           Modalities  3/11 3-13    3-20  3-22  3/25  3/28         CP prn 10' 10'    10'  10'  10'  10'                                                               Assessment: Pt had no change in pain throughout nor post treatment today  He sees his Dr Martha Min and will know how to proceed with treatment after that appointment  He was advised to continue with HEP until instructed otherwise  Plan: Progress treatment as tolerated

## 2019-03-29 ENCOUNTER — OFFICE VISIT (OUTPATIENT)
Dept: OBGYN CLINIC | Facility: OTHER | Age: 69
End: 2019-03-29
Payer: OTHER MISCELLANEOUS

## 2019-03-29 ENCOUNTER — TELEPHONE (OUTPATIENT)
Dept: FAMILY MEDICINE CLINIC | Facility: CLINIC | Age: 69
End: 2019-03-29

## 2019-03-29 ENCOUNTER — EVALUATION (OUTPATIENT)
Dept: PHYSICAL THERAPY | Facility: CLINIC | Age: 69
End: 2019-03-29
Payer: OTHER MISCELLANEOUS

## 2019-03-29 VITALS
DIASTOLIC BLOOD PRESSURE: 74 MMHG | WEIGHT: 184 LBS | SYSTOLIC BLOOD PRESSURE: 170 MMHG | HEIGHT: 73 IN | BODY MASS INDEX: 24.39 KG/M2 | HEART RATE: 102 BPM

## 2019-03-29 DIAGNOSIS — M75.121 COMPLETE TEAR OF RIGHT ROTATOR CUFF: Primary | ICD-10-CM

## 2019-03-29 DIAGNOSIS — M25.559 CHRONIC HIP PAIN, UNSPECIFIED LATERALITY: Primary | ICD-10-CM

## 2019-03-29 DIAGNOSIS — G89.29 CHRONIC HIP PAIN, UNSPECIFIED LATERALITY: Primary | ICD-10-CM

## 2019-03-29 PROCEDURE — 97110 THERAPEUTIC EXERCISES: CPT | Performed by: PHYSICAL THERAPIST

## 2019-03-29 PROCEDURE — 99213 OFFICE O/P EST LOW 20 MIN: CPT | Performed by: ORTHOPAEDIC SURGERY

## 2019-03-29 RX ORDER — METHYLPREDNISOLONE 4 MG/1
TABLET ORAL
Qty: 21 TABLET | Refills: 0 | Status: SHIPPED | OUTPATIENT
Start: 2019-03-29 | End: 2019-04-04

## 2019-03-29 NOTE — LETTER
March 29, 2019     Patient: Jeimy Estrada Sr  YOB: 1950   Date of Visit: 3/29/2019       To Whom it May Concern:    Desiree Emery is under my professional care  He was seen in my office on 3/29/2019  He remains out of work until his next evaluation in four weeks  If you have any questions or concerns, please don't hesitate to call           Sincerely,          Sharon Lin PA-C

## 2019-03-29 NOTE — PROGRESS NOTES
Patient Name:  Hilda Copeland  MRN:  [id number]    Assessment & Plan     Right shoulder rotator cuff tear  1  Patient would like to continue conservative measures at this time and would like to reserve surgery as a last resort  2  He would like to continue an additional four weeks of physical therapy  New referral provided today  3  Out of work until next evaluation  Note provided  4  Follow-up in four weeks for repeat evaluation  Briefly discussed right shoulder arthroscopic rotator cuff repair if significant pain and weakness persists  Subjective     70-year-old male returns to the office today for follow-up regarding his right shoulder rotator cuff tear  He was last seen on 2/8/19  At that time he opted to continue conservative management with physical therapy  He also received a steroid injection into the subacromial space  He does note overall improvement over the past few weeks  He still notes persistent discomfort on the lateral aspect of shoulder worse with overhead reaching as well as reaching behind his back  He also notes pain at night  He notes weakness as well  He denies instability  No numbness or tingling  No fevers or chills  General ROS:  Negative for fever or chills  Neurological ROS:  Negative for numbness or tingling  Objective     /74   Pulse 102   Ht 6' 1" (1 854 m)   Wt 83 5 kg (184 lb)   BMI 24 28 kg/m²     Right shoulder:  No gross deformity  Skin intact  No tenderness to palpation  Passive range of motion includes 160° of forward flexion, 80° of external rotation-abduction, 70° of internal rotation-abduction  Discomfort noted with terminal motion in all planes  Positive Root impingement test   Mildly positive empty can test   Mildly positive speed's test   Mildly positive cross-body adduction test   Sensation intact axillary, median, ulnar and radial nerves  2+ radial pulse  Constitutional:  Well-developed and well-nourished    Eyes: Anicteric sclerae  Lungs:  Unlabored breathing  Cardiovascular:  Capillary refill is less than 2 seconds  Skin:  Intact without erythema  Neurologic:  Sensation intact to light touch  Psychiatric:  Mood and affect are appropriate      Social History     Tobacco Use    Smoking status: Never Smoker    Smokeless tobacco: Never Used   Substance Use Topics    Alcohol use: Not on file     Comment: special occasions    Drug use: No       Scribe Attestation    I,:   Moose Ventura PA-C am acting as a scribe while in the presence of the attending physician :        I,:   Gennaro Ronquillo MD personally performed the services described in this documentation    as scribed in my presence :

## 2019-03-29 NOTE — TELEPHONE ENCOUNTER
Pt called requesting a prescription for a Prednisone pack  He said you give him that for his hip pain  Please advise

## 2019-03-29 NOTE — PROGRESS NOTES
PT Re-Evaluation     Today's date: 3/29/2019  Patient name: Fuad Maldonado  : 1950  MRN: [id number]  Referring provider: Brandy Natarajan MD  Dx:   Encounter Diagnosis     ICD-10-CM    1  Complete tear of right rotator cuff M75 121                   Assessment  Assessment details: Since time of previous re-evaluation, objectively, pt has not made any improvements in strength (or change in pain with testing) or significant changes in ROM  Pt has achieved near full PROM for past few weeks (pain at end range); however, continues to experience pain with AROM  Pt has regained functional AROM; however, experiences pain throughout motion over shoulder height and weakness  At this point in time, pt does not feel confident in his ability to perform all the tasks required 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, doors, and shifting gears)  At this point in time, pt requires only minimal verbal cues to perform exercises with correct form and technique during therapy sessions  Pt has follow-up with MD today to determine next step in care  I recommend pt continue to perform exercises to strengthen; however, I do not feel as though he needs skilled physical therapy services   Pt has been educated in the [facility]  [place]    Impairments: abnormal or restricted ROM, impaired physical strength and pain with function  Barriers to therapy: Hx of small RTC tear in same shoulder  Understanding of Dx/Px/POC: good   Prognosis: good    Goals  Short term goals - to be achieved in 2 weeks:    Decrease pain 20-50% -  met   Increase strength by 1/2 grade - met   Improve range of motion by 25% - partially met    Long term goals - to be achieved by discharge:    Overhead reaching is improved to maximal level of function - partially met   Lifting is improved to maximal level of function - partially met    IADL performance in related activities is improved to maximal level of function - partially met    Performance in related work activities is improved to maximal level of function - not met (Patient out of work)    Plan  Plan details: Continuation based on MD recommendations  At this point in time, I feel pt can safely transition to independent fitness program to continue to address strength limitations  Pt has full thickness RTC tear per MRI; therefore, full pain-free AROM and strength is not anticipated  Patient would benefit from: skilled physical therapy  Planned therapy interventions: manual therapy, neuromuscular re-education, patient education, stretching, strengthening, therapeutic activities, therapeutic exercise and home exercise program  Frequency: MD discretion  Treatment plan discussed with: patient        Subjective Evaluation    History of Present Illness  Mechanism of injury: Since time of previous re-evaluation, functionally pt reports he now experiences less pain throughout the day and more strength and mobility in overhead motions  Pt states he feels as though he is back to 85-90% of premorbid status with activities from waist height and down and back to 65-70% of premorbid status above waist height  Pt states he is a  and does not feel at this point in time, that he has the strength to be able to perform all the tasks required to fulfill his job duties    Pain  Current pain rating: 3  At best pain ratin  At worst pain ratin          Posture: slight forward head posture     TTP: Biceps groove       Shoulder MMT's  Right  Left   Forward Flexion 3+ pain 5   Extension 4+    Abduction 4 pain  5   Ext Rot 4 5   Int Rot 5 5        Mid trap 4 5   Lower trap 4 5        Elbow flex 5 5   Elbow ext 5 5     Shoulder ROM Right (AROM/PROM) Left   Flexion 155/170 180   Extension     Abduction 155/175 180   Ext Rot 60 90   Int Rot 70 (83) 70       Scapular Kinematics: Slight scapular winging on R side     Drop arm test: neg   Lateral rotation Lag sign: neg    Speed's test: pos  Charles's test: neg     Precautions: Right RTC tear, Right hip OA     Daily Treatment Diary      Manual  3/11 3-13  3-15  3/18 3-20  3-22           Right shoulder PROM JF   Full PROM NP- full ROM    SA                Shoulder inf mobs hold                     Re-eval                        AI/RS in 45, 90, 145 and 90/90         JG 15" x 2 ea  JG 15" x 2 ea                                         Exercise Diary  3/11 3-13  3-15  3/18 3-20  3-22  3/24  3/25  3/28  3-29 (RE)   Pulleys 5' 5'  5'  5' 5' [facility]     TB ER  BTB 30x BTB 30 x GTB 3 x 10  BTB 30x NV  PTB 30x  PTB 30x  PTB 30x  PTB 30x  PTB 30x   TB IR  BTB 30x BTB 30 x  PTB 30 x  PTB 30x NV  PTB 30x  PTB 30x  PTB 30x  PTB 30x  PTB 30x   TB shoulder ext  PTB 30x pt PTB 30 x  PTB 30 x  PTB 30x NV  PTB 30x  PTB 30x  PTB 30x  PTB 30x   PTB 30x   TB rows  PTB 30x PTB 30 x  PTB 30 x  PTB 30x NV  PTB 30x  PTB 30x  PTB 30x  PTB 30x   PTB 30x   Sup wand flex                       Sup wand ER                       Sidelying ER  2# 2x10      2# 20x 2# 2 x 10   2# 3 x 10  2# 30x  2# 30x  2# 30x     Prone T's  2# 2x10 2# 2 x 10  2# 2 x 10  2# 20x 2# 2 x 10  2# 3 x 10  2# 30x  2# 30x  2# 30x     Prone I's  2# 2x10 2# 3 x 10  2# 3 x 10  2# 30x 2# 3 x 10  2# 3 x 10  2# 30x  2# 30x  2# 30x     Prone Y's  2# 2x10 2# 2 x 10  2# 3 x 10  2# 30x 2# 3 x 10  2# 2 x 10  2# 30x  2# 30x  2# 30x     Supine serratus punches     NV  2# 30x 2# 2 x 10  2# 2 x 10  2# 20x  2# 30x  2# 30x     MB on wall CW/CCW     NV  yellow 20x yellow 20x  yellow 20x  yellow 20x  yellow 20x  yellow 20x  yellow 20x                           Stand shld flex  2x10    2 x 10  20x 2 x 10  NV  20x  20x  20x  20x   Stand shld scap     2 x 10  20x 2 x 10  NV  20x  20x  20x  20x   UBE 4'f/4' b 4'f/4' b  4'f/4' b  4'f/4'b 4'f/4'b  4'f/4'b  4'f/4'b  4'f/4'b  4'f/4'b  4'f/4'b    Doorway biceps stretch    3 x 30"  3 x 30"  30" 3x 30" 3x  30" 3x  30" 3x  30" 3x  30" 3x  3 x 30"                                                       Modalities  3/11 3-13    3-20  3-22  3/25  3/28         CP prn 10' 10'    10'  10'  10'  10'                                                             * Held on prone and sidelying exercises this date due to recent nose surgery with restrictions to avoid laying down flat and avoid strenuous activity  Performed ROM testing this date with pt in elevated on wedge

## 2019-04-01 ENCOUNTER — TELEPHONE (OUTPATIENT)
Dept: FAMILY MEDICINE CLINIC | Facility: CLINIC | Age: 69
End: 2019-04-01

## 2019-04-02 ENCOUNTER — OFFICE VISIT (OUTPATIENT)
Dept: PHYSICAL THERAPY | Facility: CLINIC | Age: 69
End: 2019-04-02
Payer: OTHER MISCELLANEOUS

## 2019-04-02 DIAGNOSIS — M75.121 COMPLETE TEAR OF RIGHT ROTATOR CUFF, UNSPECIFIED WHETHER TRAUMATIC: Primary | ICD-10-CM

## 2019-04-02 PROCEDURE — 97112 NEUROMUSCULAR REEDUCATION: CPT

## 2019-04-02 PROCEDURE — 97140 MANUAL THERAPY 1/> REGIONS: CPT

## 2019-04-02 PROCEDURE — 97110 THERAPEUTIC EXERCISES: CPT

## 2019-04-03 DIAGNOSIS — I15.8 OTHER SECONDARY HYPERTENSION: Primary | ICD-10-CM

## 2019-04-03 RX ORDER — AMLODIPINE BESYLATE 5 MG/1
10 TABLET ORAL DAILY
Qty: 30 TABLET | Refills: 3 | Status: SHIPPED | OUTPATIENT
Start: 2019-04-03 | End: 2019-04-10 | Stop reason: DRUGHIGH

## 2019-04-05 ENCOUNTER — OFFICE VISIT (OUTPATIENT)
Dept: PHYSICAL THERAPY | Facility: CLINIC | Age: 69
End: 2019-04-05
Payer: OTHER MISCELLANEOUS

## 2019-04-05 DIAGNOSIS — M75.121 COMPLETE TEAR OF RIGHT ROTATOR CUFF, UNSPECIFIED WHETHER TRAUMATIC: Primary | ICD-10-CM

## 2019-04-05 PROCEDURE — 97110 THERAPEUTIC EXERCISES: CPT | Performed by: PHYSICAL THERAPIST

## 2019-04-05 PROCEDURE — 97112 NEUROMUSCULAR REEDUCATION: CPT | Performed by: PHYSICAL THERAPIST

## 2019-04-08 ENCOUNTER — OFFICE VISIT (OUTPATIENT)
Dept: PHYSICAL THERAPY | Facility: CLINIC | Age: 69
End: 2019-04-08
Payer: OTHER MISCELLANEOUS

## 2019-04-08 DIAGNOSIS — M75.121 COMPLETE TEAR OF RIGHT ROTATOR CUFF, UNSPECIFIED WHETHER TRAUMATIC: Primary | ICD-10-CM

## 2019-04-08 PROCEDURE — 97110 THERAPEUTIC EXERCISES: CPT | Performed by: PHYSICAL THERAPIST

## 2019-04-08 PROCEDURE — 97112 NEUROMUSCULAR REEDUCATION: CPT | Performed by: PHYSICAL THERAPIST

## 2019-04-10 ENCOUNTER — OFFICE VISIT (OUTPATIENT)
Dept: PHYSICAL THERAPY | Facility: CLINIC | Age: 69
End: 2019-04-10
Payer: OTHER MISCELLANEOUS

## 2019-04-10 DIAGNOSIS — M75.121 COMPLETE TEAR OF RIGHT ROTATOR CUFF, UNSPECIFIED WHETHER TRAUMATIC: Primary | ICD-10-CM

## 2019-04-10 DIAGNOSIS — I15.8 OTHER SECONDARY HYPERTENSION: ICD-10-CM

## 2019-04-10 PROCEDURE — 97110 THERAPEUTIC EXERCISES: CPT | Performed by: PHYSICAL THERAPIST

## 2019-04-10 PROCEDURE — 97112 NEUROMUSCULAR REEDUCATION: CPT | Performed by: PHYSICAL THERAPIST

## 2019-04-10 RX ORDER — AMLODIPINE BESYLATE 5 MG/1
TABLET ORAL
Qty: 30 TABLET | Refills: 0 | Status: SHIPPED | OUTPATIENT
Start: 2019-04-10 | End: 2019-10-14 | Stop reason: SDUPTHER

## 2019-04-12 ENCOUNTER — OFFICE VISIT (OUTPATIENT)
Dept: PHYSICAL THERAPY | Facility: CLINIC | Age: 69
End: 2019-04-12
Payer: OTHER MISCELLANEOUS

## 2019-04-12 ENCOUNTER — TELEPHONE (OUTPATIENT)
Dept: PAIN MEDICINE | Facility: MEDICAL CENTER | Age: 69
End: 2019-04-12

## 2019-04-12 DIAGNOSIS — M75.121 COMPLETE TEAR OF RIGHT ROTATOR CUFF: ICD-10-CM

## 2019-04-12 DIAGNOSIS — M75.121 COMPLETE TEAR OF RIGHT ROTATOR CUFF, UNSPECIFIED WHETHER TRAUMATIC: Primary | ICD-10-CM

## 2019-04-12 PROCEDURE — 97112 NEUROMUSCULAR REEDUCATION: CPT | Performed by: PHYSICAL THERAPIST

## 2019-04-12 PROCEDURE — 97110 THERAPEUTIC EXERCISES: CPT | Performed by: PHYSICAL THERAPIST

## 2019-04-12 RX ORDER — METHOCARBAMOL 500 MG/1
500 TABLET, FILM COATED ORAL 4 TIMES DAILY
Qty: 30 TABLET | Refills: 0 | Status: SHIPPED | OUTPATIENT
Start: 2019-04-12 | End: 2019-05-16

## 2019-04-12 RX ORDER — OXYCODONE HYDROCHLORIDE 5 MG/1
5 TABLET ORAL DAILY PRN
Qty: 20 TABLET | Refills: 0 | Status: SHIPPED | OUTPATIENT
Start: 2019-04-12 | End: 2019-07-09

## 2019-04-15 ENCOUNTER — OFFICE VISIT (OUTPATIENT)
Dept: PHYSICAL THERAPY | Facility: CLINIC | Age: 69
End: 2019-04-15
Payer: OTHER MISCELLANEOUS

## 2019-04-15 DIAGNOSIS — M75.121 COMPLETE TEAR OF RIGHT ROTATOR CUFF, UNSPECIFIED WHETHER TRAUMATIC: Primary | ICD-10-CM

## 2019-04-15 PROCEDURE — 97112 NEUROMUSCULAR REEDUCATION: CPT

## 2019-04-15 PROCEDURE — 97110 THERAPEUTIC EXERCISES: CPT

## 2019-04-15 PROCEDURE — 97140 MANUAL THERAPY 1/> REGIONS: CPT

## 2019-04-17 ENCOUNTER — OFFICE VISIT (OUTPATIENT)
Dept: PHYSICAL THERAPY | Facility: CLINIC | Age: 69
End: 2019-04-17
Payer: OTHER MISCELLANEOUS

## 2019-04-17 DIAGNOSIS — M75.121 COMPLETE TEAR OF RIGHT ROTATOR CUFF, UNSPECIFIED WHETHER TRAUMATIC: Primary | ICD-10-CM

## 2019-04-17 PROCEDURE — 97112 NEUROMUSCULAR REEDUCATION: CPT | Performed by: PHYSICAL THERAPIST

## 2019-04-17 PROCEDURE — 97110 THERAPEUTIC EXERCISES: CPT | Performed by: PHYSICAL THERAPIST

## 2019-04-19 ENCOUNTER — OFFICE VISIT (OUTPATIENT)
Dept: PHYSICAL THERAPY | Facility: CLINIC | Age: 69
End: 2019-04-19
Payer: OTHER MISCELLANEOUS

## 2019-04-19 DIAGNOSIS — M75.121 COMPLETE TEAR OF RIGHT ROTATOR CUFF, UNSPECIFIED WHETHER TRAUMATIC: Primary | ICD-10-CM

## 2019-04-19 PROCEDURE — 97112 NEUROMUSCULAR REEDUCATION: CPT

## 2019-04-19 PROCEDURE — 97140 MANUAL THERAPY 1/> REGIONS: CPT

## 2019-04-19 PROCEDURE — 97110 THERAPEUTIC EXERCISES: CPT

## 2019-04-22 ENCOUNTER — OFFICE VISIT (OUTPATIENT)
Dept: PHYSICAL THERAPY | Facility: CLINIC | Age: 69
End: 2019-04-22
Payer: OTHER MISCELLANEOUS

## 2019-04-22 DIAGNOSIS — M75.121 COMPLETE TEAR OF RIGHT ROTATOR CUFF, UNSPECIFIED WHETHER TRAUMATIC: Primary | ICD-10-CM

## 2019-04-22 PROCEDURE — 97112 NEUROMUSCULAR REEDUCATION: CPT

## 2019-04-22 PROCEDURE — 97140 MANUAL THERAPY 1/> REGIONS: CPT

## 2019-04-22 PROCEDURE — 97110 THERAPEUTIC EXERCISES: CPT

## 2019-04-24 ENCOUNTER — OFFICE VISIT (OUTPATIENT)
Dept: PHYSICAL THERAPY | Facility: CLINIC | Age: 69
End: 2019-04-24
Payer: OTHER MISCELLANEOUS

## 2019-04-24 DIAGNOSIS — M75.121 COMPLETE TEAR OF RIGHT ROTATOR CUFF, UNSPECIFIED WHETHER TRAUMATIC: Primary | ICD-10-CM

## 2019-04-24 PROCEDURE — 97110 THERAPEUTIC EXERCISES: CPT | Performed by: PHYSICAL THERAPIST

## 2019-04-24 PROCEDURE — 97112 NEUROMUSCULAR REEDUCATION: CPT | Performed by: PHYSICAL THERAPIST

## 2019-04-26 ENCOUNTER — EVALUATION (OUTPATIENT)
Dept: PHYSICAL THERAPY | Facility: CLINIC | Age: 69
End: 2019-04-26
Payer: OTHER MISCELLANEOUS

## 2019-04-26 DIAGNOSIS — M75.121 COMPLETE TEAR OF RIGHT ROTATOR CUFF, UNSPECIFIED WHETHER TRAUMATIC: Primary | ICD-10-CM

## 2019-04-26 PROCEDURE — 97110 THERAPEUTIC EXERCISES: CPT | Performed by: PHYSICAL THERAPIST

## 2019-04-26 PROCEDURE — 97112 NEUROMUSCULAR REEDUCATION: CPT | Performed by: PHYSICAL THERAPIST

## 2019-04-30 ENCOUNTER — TRANSCRIBE ORDERS (OUTPATIENT)
Dept: LAB | Facility: CLINIC | Age: 69
End: 2019-04-30

## 2019-04-30 ENCOUNTER — OFFICE VISIT (OUTPATIENT)
Dept: OBGYN CLINIC | Facility: CLINIC | Age: 69
End: 2019-04-30
Payer: OTHER MISCELLANEOUS

## 2019-04-30 ENCOUNTER — APPOINTMENT (OUTPATIENT)
Dept: LAB | Facility: CLINIC | Age: 69
End: 2019-04-30
Payer: OTHER MISCELLANEOUS

## 2019-04-30 VITALS
WEIGHT: 185 LBS | DIASTOLIC BLOOD PRESSURE: 81 MMHG | SYSTOLIC BLOOD PRESSURE: 160 MMHG | HEART RATE: 76 BPM | HEIGHT: 73 IN | BODY MASS INDEX: 24.52 KG/M2

## 2019-04-30 DIAGNOSIS — S46.011D TRAUMATIC COMPLETE TEAR OF RIGHT ROTATOR CUFF, SUBSEQUENT ENCOUNTER: ICD-10-CM

## 2019-04-30 DIAGNOSIS — S46.011D TRAUMATIC COMPLETE TEAR OF RIGHT ROTATOR CUFF, SUBSEQUENT ENCOUNTER: Primary | ICD-10-CM

## 2019-04-30 PROBLEM — S46.011A TRAUMATIC COMPLETE TEAR OF RIGHT ROTATOR CUFF: Status: ACTIVE | Noted: 2019-04-30

## 2019-04-30 LAB
ANION GAP SERPL CALCULATED.3IONS-SCNC: 11 MMOL/L (ref 4–13)
BUN SERPL-MCNC: 29 MG/DL (ref 5–25)
CALCIUM SERPL-MCNC: 9.1 MG/DL (ref 8.3–10.1)
CHLORIDE SERPL-SCNC: 106 MMOL/L (ref 100–108)
CO2 SERPL-SCNC: 25 MMOL/L (ref 21–32)
CREAT SERPL-MCNC: 1.18 MG/DL (ref 0.6–1.3)
EST. AVERAGE GLUCOSE BLD GHB EST-MCNC: 94 MG/DL
GFR SERPL CREATININE-BSD FRML MDRD: 63 ML/MIN/1.73SQ M
GLUCOSE SERPL-MCNC: 79 MG/DL (ref 65–140)
HBA1C MFR BLD: 4.9 % (ref 4.2–6.3)
POTASSIUM SERPL-SCNC: 4 MMOL/L (ref 3.5–5.3)
SODIUM SERPL-SCNC: 142 MMOL/L (ref 136–145)

## 2019-04-30 PROCEDURE — 83036 HEMOGLOBIN GLYCOSYLATED A1C: CPT

## 2019-04-30 PROCEDURE — 36415 COLL VENOUS BLD VENIPUNCTURE: CPT

## 2019-04-30 PROCEDURE — 93005 ELECTROCARDIOGRAM TRACING: CPT | Performed by: ORTHOPAEDIC SURGERY

## 2019-04-30 PROCEDURE — 99214 OFFICE O/P EST MOD 30 MIN: CPT | Performed by: ORTHOPAEDIC SURGERY

## 2019-04-30 PROCEDURE — 80048 BASIC METABOLIC PNL TOTAL CA: CPT

## 2019-04-30 RX ORDER — CHLORHEXIDINE GLUCONATE 4 G/100ML
SOLUTION TOPICAL DAILY PRN
Status: CANCELLED | OUTPATIENT
Start: 2019-04-30

## 2019-04-30 RX ORDER — CEFAZOLIN SODIUM 2 G/50ML
2000 SOLUTION INTRAVENOUS ONCE
Status: CANCELLED | OUTPATIENT
Start: 2019-04-30 | End: 2019-04-30

## 2019-05-01 LAB
ATRIAL RATE: 72 BPM
P AXIS: 57 DEGREES
PR INTERVAL: 118 MS
QRS AXIS: 8 DEGREES
QRSD INTERVAL: 104 MS
QT INTERVAL: 394 MS
QTC INTERVAL: 431 MS
T WAVE AXIS: 123 DEGREES
VENTRICULAR RATE: 72 BPM

## 2019-05-01 PROCEDURE — 93010 ELECTROCARDIOGRAM REPORT: CPT | Performed by: INTERNAL MEDICINE

## 2019-05-08 ENCOUNTER — ANESTHESIA EVENT (OUTPATIENT)
Dept: PERIOP | Facility: HOSPITAL | Age: 69
End: 2019-05-08

## 2019-05-14 ENCOUNTER — TELEPHONE (OUTPATIENT)
Dept: OBGYN CLINIC | Facility: HOSPITAL | Age: 69
End: 2019-05-14

## 2019-05-16 ENCOUNTER — DOCUMENTATION (OUTPATIENT)
Dept: OBGYN CLINIC | Facility: CLINIC | Age: 69
End: 2019-05-16

## 2019-05-20 DIAGNOSIS — M25.50 ARTHRALGIA, UNSPECIFIED JOINT: ICD-10-CM

## 2019-05-20 RX ORDER — MELOXICAM 15 MG/1
TABLET ORAL
Qty: 90 TABLET | Refills: 0 | Status: SHIPPED | OUTPATIENT
Start: 2019-05-20 | End: 2019-07-09

## 2019-05-21 ENCOUNTER — OFFICE VISIT (OUTPATIENT)
Dept: OBGYN CLINIC | Facility: CLINIC | Age: 69
End: 2019-05-21

## 2019-05-21 VITALS
WEIGHT: 183 LBS | BODY MASS INDEX: 24.25 KG/M2 | HEIGHT: 73 IN | HEART RATE: 108 BPM | SYSTOLIC BLOOD PRESSURE: 188 MMHG | DIASTOLIC BLOOD PRESSURE: 83 MMHG

## 2019-05-21 DIAGNOSIS — S46.011D TRAUMATIC COMPLETE TEAR OF RIGHT ROTATOR CUFF, SUBSEQUENT ENCOUNTER: Primary | ICD-10-CM

## 2019-05-21 PROCEDURE — 99213 OFFICE O/P EST LOW 20 MIN: CPT | Performed by: ORTHOPAEDIC SURGERY

## 2019-05-23 ENCOUNTER — ANESTHESIA (OUTPATIENT)
Dept: PERIOP | Facility: HOSPITAL | Age: 69
End: 2019-05-23

## 2019-05-30 ENCOUNTER — TELEPHONE (OUTPATIENT)
Dept: PHYSICAL THERAPY | Facility: CLINIC | Age: 69
End: 2019-05-30

## 2019-06-07 ENCOUNTER — OFFICE VISIT (OUTPATIENT)
Dept: OBGYN CLINIC | Facility: CLINIC | Age: 69
End: 2019-06-07
Payer: OTHER MISCELLANEOUS

## 2019-06-07 VITALS
BODY MASS INDEX: 24.65 KG/M2 | SYSTOLIC BLOOD PRESSURE: 156 MMHG | DIASTOLIC BLOOD PRESSURE: 88 MMHG | HEART RATE: 76 BPM | HEIGHT: 73 IN | WEIGHT: 186 LBS

## 2019-06-07 DIAGNOSIS — S46.011D TRAUMATIC COMPLETE TEAR OF RIGHT ROTATOR CUFF, SUBSEQUENT ENCOUNTER: Primary | ICD-10-CM

## 2019-06-07 PROCEDURE — 99213 OFFICE O/P EST LOW 20 MIN: CPT | Performed by: ORTHOPAEDIC SURGERY

## 2019-07-09 ENCOUNTER — OFFICE VISIT (OUTPATIENT)
Dept: OBGYN CLINIC | Facility: CLINIC | Age: 69
End: 2019-07-09
Payer: OTHER MISCELLANEOUS

## 2019-07-09 VITALS
BODY MASS INDEX: 24.52 KG/M2 | HEIGHT: 73 IN | HEART RATE: 83 BPM | DIASTOLIC BLOOD PRESSURE: 83 MMHG | SYSTOLIC BLOOD PRESSURE: 144 MMHG | WEIGHT: 185 LBS

## 2019-07-09 DIAGNOSIS — S46.011D TRAUMATIC COMPLETE TEAR OF RIGHT ROTATOR CUFF, SUBSEQUENT ENCOUNTER: Primary | ICD-10-CM

## 2019-07-09 PROCEDURE — 99213 OFFICE O/P EST LOW 20 MIN: CPT | Performed by: ORTHOPAEDIC SURGERY

## 2019-07-09 RX ORDER — INDOMETHACIN 50 MG/1
50 CAPSULE ORAL 2 TIMES DAILY PRN
Qty: 60 CAPSULE | Refills: 0 | Status: SHIPPED | OUTPATIENT
Start: 2019-07-09 | End: 2019-08-09 | Stop reason: SDUPTHER
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
## 2019-08-09 ENCOUNTER — TELEPHONE (OUTPATIENT)
Dept: OBGYN CLINIC | Facility: CLINIC | Age: 69
End: 2019-08-09

## 2019-08-09 DIAGNOSIS — S46.011D TRAUMATIC COMPLETE TEAR OF RIGHT ROTATOR CUFF, SUBSEQUENT ENCOUNTER: Primary | ICD-10-CM

## 2019-08-09 RX ORDER — INDOMETHACIN 50 MG/1
50 CAPSULE ORAL 2 TIMES DAILY PRN
Qty: 60 CAPSULE | Refills: 0 | Status: SHIPPED | OUTPATIENT
Start: 2019-08-09 | End: 2019-10-02 | Stop reason: SDUPTHER

## 2019-08-09 NOTE — TELEPHONE ENCOUNTER
Pt contacted Call Center requested refill of their medication  Medication Name:  indocin      Dosage of Med: 50 mg      Frequency of Med: 1 capsule 2 x day      Remaining Medication:      Pharmacy and Location: [facility] [place] [address]        Pt  Preferred Callback Phone Number:      Thank you

## 2019-08-22 ENCOUNTER — OFFICE VISIT (OUTPATIENT)
Dept: FAMILY MEDICINE CLINIC | Facility: CLINIC | Age: 69
End: 2019-08-22

## 2019-08-22 VITALS
BODY MASS INDEX: 24.25 KG/M2 | OXYGEN SATURATION: 100 % | SYSTOLIC BLOOD PRESSURE: 138 MMHG | WEIGHT: 183 LBS | TEMPERATURE: 99.1 F | DIASTOLIC BLOOD PRESSURE: 64 MMHG | HEIGHT: 73 IN | HEART RATE: 79 BPM

## 2019-08-22 DIAGNOSIS — J32.9 SINUSITIS, UNSPECIFIED CHRONICITY, UNSPECIFIED LOCATION: Primary | ICD-10-CM

## 2019-08-22 PROCEDURE — 99213 OFFICE O/P EST LOW 20 MIN: CPT | Performed by: FAMILY MEDICINE

## 2019-08-22 RX ORDER — FLUTICASONE PROPIONATE 50 MCG
1 SPRAY, SUSPENSION (ML) NASAL DAILY
Qty: 1 BOTTLE | Refills: 3 | Status: SHIPPED | OUTPATIENT
Start: 2019-08-22 | End: 2020-01-29 | Stop reason: ALTCHOICE

## 2019-08-22 RX ORDER — AMOXICILLIN AND CLAVULANATE POTASSIUM 875; 125 MG/1; MG/1
1 TABLET, FILM COATED ORAL EVERY 12 HOURS SCHEDULED
Qty: 20 TABLET | Refills: 0 | Status: SHIPPED | OUTPATIENT
Start: 2019-08-22 | End: 2019-09-01

## 2019-08-22 NOTE — PROGRESS NOTES
Assessment/Plan:         Diagnoses and all orders for this visit:    Sinusitis, unspecified chronicity, unspecified location  -     amoxicillin-clavulanate (AUGMENTIN) 875-125 mg per tablet; Take 1 tablet by mouth every 12 (twelve) hours for 10 days  -     fluticasone (FLONASE) 50 mcg/act nasal spray; 1 spray into each nostril daily        Sinusitis  discussed plan of care , rec preventative measures , if past issues with seasonal allergens rec aggressive treatment , alejandro pot , nasal rinse , if no history of htn / cad  consider otc  decongestant for short term treatment , muccinex otc  consider the use of nasal steroid short term        Subjective:      Patient ID: Boby Damico  is a 80 y o  male  Started last Tuesday   With head congestion chest congestion, sore throat sinus pressure, ears feel  Clogged  Fever rash noted no known number  Has not been taking Mucinex cannot tolerate  Known Ill contact grandchildren  Negative smoker  otc afrin   phelgm discolored , thick   Nasal discharge     Denies shortness of breath difficulty breathing, negative exercise intolerance, negative wheezing      The following portions of the patient's history were reviewed and updated as appropriate:   He has a past medical history of Hypertension  ,  does not have any pertinent problems on file  ,   has a past surgical history that includes Wrist surgery (Right); Mohs surgery (03/26/2019); [place] tooth extraction; and Dental surgery  ,  family history includes Cancer in his other; Diabetes in his mother  ,   reports that he has never smoked  He has never used smokeless tobacco  He reports that he drinks alcohol  He reports that he does not use drugs  ,  is allergic to tetracyclines & related and demerol [meperidine]         Review of Systems   Constitutional: Negative for appetite change, chills, fever and unexpected weight change     HENT: Positive for congestion, ear pain, postnasal drip, sinus pressure, sinus pain and sore throat  Negative for dental problem, hearing loss, rhinorrhea, sneezing, tinnitus and voice change  Eyes: Negative for visual disturbance  Respiratory: Positive for cough  Negative for apnea, chest tightness, shortness of breath and wheezing  Cardiovascular: Negative for chest pain, palpitations and leg swelling  Gastrointestinal: Negative for abdominal pain, blood in stool, constipation, diarrhea, nausea and vomiting  Endocrine: Negative for cold intolerance, heat intolerance, polydipsia, polyphagia and polyuria  Genitourinary: Negative for decreased urine volume, difficulty urinating, dysuria, frequency and hematuria  Musculoskeletal: Negative for arthralgias, back pain, gait problem, joint swelling and myalgias  Skin: Negative for color change, rash and wound  Allergic/Immunologic: Negative for environmental allergies and food allergies  Neurological: Negative for dizziness, syncope, weakness, light-headedness, numbness and headaches  Hematological: Negative for adenopathy  Does not bruise/bleed easily  Psychiatric/Behavioral: Negative for sleep disturbance and suicidal ideas  The patient is not nervous/anxious  Objective:  Vitals:    08/22/19 1445   BP: 138/64   Pulse: 79   Temp: 99 1 °F (37 3 °C)   SpO2: 100%      Physical Exam   Constitutional: He is oriented to person, place, and time  He appears well-developed and well-nourished  No distress  HENT:   Head: Normocephalic and atraumatic  Right Ear: Tympanic membrane and external ear normal    Left Ear: Tympanic membrane and external ear normal    Nose: Mucosal edema and rhinorrhea present  Right sinus exhibits maxillary sinus tenderness  Left sinus exhibits maxillary sinus tenderness  Mouth/Throat: Uvula is midline, oropharynx is clear and moist and mucous membranes are normal  Oropharyngeal exudate: Slightly erythematous with heavy postnasal drip  Eyes: Conjunctivae are normal  No scleral icterus     Neck: Normal range of motion  Neck supple  Cardiovascular: Normal rate, regular rhythm and normal heart sounds  Pulmonary/Chest: Effort normal and breath sounds normal  He has no wheezes ( negative forced expiratory wheeze)  Musculoskeletal: Normal range of motion  Lymphadenopathy:     He has no cervical adenopathy  Neurological: He is oriented to person, place, and time  Skin: Skin is warm and dry  No rash noted

## 2019-08-24 DIAGNOSIS — M25.50 ARTHRALGIA, UNSPECIFIED JOINT: ICD-10-CM

## 2019-08-24 RX ORDER — MELOXICAM 15 MG/1
TABLET ORAL
Qty: 90 TABLET | Refills: 0 | Status: SHIPPED | OUTPATIENT
Start: 2019-08-24 | End: 2020-01-29 | Stop reason: ALTCHOICE

## 2019-10-02 DIAGNOSIS — S46.011D TRAUMATIC COMPLETE TEAR OF RIGHT ROTATOR CUFF, SUBSEQUENT ENCOUNTER: ICD-10-CM

## 2019-10-03 RX ORDER — INDOMETHACIN 50 MG/1
50 CAPSULE ORAL 2 TIMES DAILY PRN
Qty: 60 CAPSULE | Refills: 0 | Status: SHIPPED | OUTPATIENT
Start: 2019-10-03 | End: 2019-11-01 | Stop reason: SDUPTHER

## 2019-10-14 ENCOUNTER — OFFICE VISIT (OUTPATIENT)
Dept: FAMILY MEDICINE CLINIC | Facility: CLINIC | Age: 69
End: 2019-10-14

## 2019-10-14 VITALS
OXYGEN SATURATION: 98 % | SYSTOLIC BLOOD PRESSURE: 132 MMHG | BODY MASS INDEX: 24.7 KG/M2 | DIASTOLIC BLOOD PRESSURE: 74 MMHG | WEIGHT: 186.4 LBS | HEIGHT: 73 IN | TEMPERATURE: 98 F | HEART RATE: 72 BPM

## 2019-10-14 DIAGNOSIS — S46.011D TRAUMATIC COMPLETE TEAR OF RIGHT ROTATOR CUFF, SUBSEQUENT ENCOUNTER: ICD-10-CM

## 2019-10-14 DIAGNOSIS — I10 ESSENTIAL HYPERTENSION: Primary | ICD-10-CM

## 2019-10-14 DIAGNOSIS — N52.01 ERECTILE DYSFUNCTION DUE TO ARTERIAL INSUFFICIENCY: ICD-10-CM

## 2019-10-14 PROCEDURE — 99212 OFFICE O/P EST SF 10 MIN: CPT | Performed by: FAMILY MEDICINE

## 2019-10-14 RX ORDER — SILDENAFIL CITRATE 20 MG/1
TABLET ORAL
Qty: 30 TABLET | Refills: 1 | Status: SHIPPED | OUTPATIENT
Start: 2019-10-14 | End: 2019-10-24 | Stop reason: SDUPTHER

## 2019-10-14 RX ORDER — METHYLPREDNISOLONE 4 MG/1
TABLET ORAL
Qty: 21 TABLET | Refills: 0 | Status: SHIPPED | OUTPATIENT
Start: 2019-10-14 | End: 2019-10-20

## 2019-10-14 RX ORDER — AMLODIPINE BESYLATE 5 MG/1
5 TABLET ORAL DAILY
Qty: 30 TABLET | Refills: 3 | Status: SHIPPED | OUTPATIENT
Start: 2019-10-14 | End: 2020-01-27

## 2019-10-14 RX ORDER — SILDENAFIL CITRATE 20 MG/1
TABLET ORAL
Qty: 30 TABLET | Refills: 1 | Status: SHIPPED | OUTPATIENT
Start: 2019-10-14 | End: 2019-10-14 | Stop reason: SDUPTHER

## 2019-10-14 NOTE — PROGRESS NOTES
Assessment/Plan:       Diagnoses and all orders for this visit:    Essential hypertension  -     amLODIPine (NORVASC) 5 mg tablet; Take 1 tablet (5 mg total) by mouth daily    Erectile dysfunction due to arterial insufficiency  -     Discontinue: sildenafil (REVATIO) 20 mg tablet; Three tablets daily, as needed for erectile dysfunction  -     sildenafil (REVATIO) 20 mg tablet; Three tablets daily, as needed for erectile dysfunction    Traumatic complete tear of right rotator cuff, subsequent encounter  -     methylPREDNISolone 4 MG tablet therapy pack; Use as directed on package            Subjective:      Patient ID: Unknown Abhishek  is a 80 y o  male  Patient comes in today complaining of erectile dysfunction, he states that he is able to obtain an erection, however it is not hard enough to obtain penetration he denies any pain, he denies any testicular symptoms  He states that he still is able to have an ejaculation  He does need refills of his prednisone, he has this for his shoulder pain  He states that his shoulder has been bothering him recently      The following portions of the patient's history were reviewed and updated as appropriate:   He has a past medical history of Hypertension  ,  does not have any pertinent problems on file  ,   has a past surgical history that includes Wrist surgery (Right); Mohs surgery (03/26/2019); [place] tooth extraction; and Dental surgery  ,  family history includes Cancer in his other; Diabetes in his mother  ,   reports that he has never smoked  He has never used smokeless tobacco  He reports that he drinks alcohol  He reports that he does not use drugs  ,  is allergic to tetracyclines & related and demerol [meperidine]     Current Outpatient Medications   Medication Sig Dispense Refill    amLODIPine (NORVASC) 5 mg tablet Take 1 tablet (5 mg total) by mouth daily 30 tablet 3    fluticasone (FLONASE) 50 mcg/act nasal spray 1 spray into each nostril daily 1 Bottle 3    indomethacin (INDOCIN) 50 mg capsule TAKE 1 CAPSULE (50 MG TOTAL) BY MOUTH 2 (TWO) TIMES A DAY AS NEEDED FOR MODERATE PAIN 60 capsule 0    meloxicam (MOBIC) 15 mg tablet TAKE 1 TABLET BY MOUTH EVERY DAY 90 tablet 0    methylPREDNISolone 4 MG tablet therapy pack Use as directed on package 21 tablet 0    sildenafil (REVATIO) 20 mg tablet Three tablets daily, as needed for erectile dysfunction 30 tablet 1     No current facility-administered medications for this visit  Review of Systems   Constitutional: Negative for chills, fatigue and fever  HENT: Negative for congestion, ear pain, hearing loss, postnasal drip, rhinorrhea and sore throat  Eyes: Negative for pain and visual disturbance  Respiratory: Negative for chest tightness, shortness of breath and wheezing  Cardiovascular: Negative for chest pain and leg swelling  Gastrointestinal: Negative for abdominal distention, abdominal pain, constipation, diarrhea and vomiting  Endocrine: Negative for cold intolerance and heat intolerance  Genitourinary: Negative for difficulty urinating, frequency and urgency  Musculoskeletal: Negative for arthralgias and gait problem  Skin: Negative for color change  Neurological: Negative for dizziness, tremors, syncope, numbness and headaches  Hematological: Negative for adenopathy  Psychiatric/Behavioral: Negative for agitation, confusion and sleep disturbance  The patient is not nervous/anxious  Objective:  Vitals:    10/14/19 1310   BP: 132/74   Pulse: 72   Temp: 98 °F (36 7 °C)   TempSrc: Tympanic   SpO2: 98%   Weight: 84 6 kg (186 lb 6 4 oz)   Height: 6' 1" (1 854 m)     Body mass index is 24 59 kg/m²  Physical Exam   Constitutional: He is oriented to person, place, and time  He appears well-developed and well-nourished  HENT:   Head: Normocephalic  Mouth/Throat: Oropharynx is clear and moist    Eyes: Conjunctivae are normal  No scleral icterus  Neck: Normal range of motion  No thyromegaly present  Cardiovascular: Normal rate, regular rhythm and normal heart sounds  No murmur heard  Pulmonary/Chest: Effort normal and breath sounds normal  No respiratory distress  He has no wheezes  Abdominal: Soft  Bowel sounds are normal  He exhibits no distension  There is no tenderness  Musculoskeletal: Normal range of motion  He exhibits no edema or tenderness  Lymphadenopathy:     He has no cervical adenopathy  Neurological: He is alert and oriented to person, place, and time  No cranial nerve deficit  Skin: Skin is warm and dry  No rash noted  No pallor  Psychiatric: He has a normal mood and affect  His behavior is normal    Nursing note and vitals reviewed

## 2019-10-18 ENCOUNTER — TELEPHONE (OUTPATIENT)
Dept: FAMILY MEDICINE CLINIC | Facility: CLINIC | Age: 69
End: 2019-10-18

## 2019-10-24 DIAGNOSIS — N52.01 ERECTILE DYSFUNCTION DUE TO ARTERIAL INSUFFICIENCY: ICD-10-CM

## 2019-10-24 RX ORDER — SILDENAFIL CITRATE 20 MG/1
TABLET ORAL
Qty: 30 TABLET | Refills: 1 | Status: SHIPPED | OUTPATIENT
Start: 2019-10-24 | End: 2020-01-29 | Stop reason: ALTCHOICE

## 2019-11-01 DIAGNOSIS — S46.011D TRAUMATIC COMPLETE TEAR OF RIGHT ROTATOR CUFF, SUBSEQUENT ENCOUNTER: ICD-10-CM

## 2019-11-01 RX ORDER — INDOMETHACIN 50 MG/1
50 CAPSULE ORAL 2 TIMES DAILY PRN
Qty: 60 CAPSULE | Refills: 0 | Status: SHIPPED | OUTPATIENT
Start: 2019-11-01 | End: 2019-12-09 | Stop reason: SDUPTHER

## 2019-11-20 ENCOUNTER — TELEPHONE (OUTPATIENT)
Dept: OBGYN CLINIC | Facility: HOSPITAL | Age: 69
End: 2019-11-20

## 2019-11-20 NOTE — TELEPHONE ENCOUNTER
Alexa Blackmon at [facility] is calling because they have made several attempts to fax a form that needs to be filled out for the patient but we are not receiving it  Alexa Blackmon stated that the form was last faxed on 11/15/19 to [phone]  I called to get confirmation of receipt but  was not able to verify & requested that they refax it  I called Jeanine León to see if she remembered seeing it, she asked Dr Socorro Gonzales who signed it & stated that the form would be faxed back today  Relayed message to Alexa Blackmon

## 2019-11-27 NOTE — TELEPHONE ENCOUNTER
Adore from [facility] is calling to report that they have not yet received the document signed by Dr Fabien Wilkes stated that she spoke with [facility] at our office on 11/25/19, who told her she was going to fax the executed document      I printed and faxed the document to Merced Wilkes at [phone]

## 2019-12-09 DIAGNOSIS — S46.011D TRAUMATIC COMPLETE TEAR OF RIGHT ROTATOR CUFF, SUBSEQUENT ENCOUNTER: ICD-10-CM

## 2019-12-09 RX ORDER — INDOMETHACIN 50 MG/1
50 CAPSULE ORAL 2 TIMES DAILY PRN
Qty: 60 CAPSULE | Refills: 0 | Status: SHIPPED | OUTPATIENT
Start: 2019-12-09 | End: 2020-01-19

## 2020-01-18 DIAGNOSIS — S46.011D TRAUMATIC COMPLETE TEAR OF RIGHT ROTATOR CUFF, SUBSEQUENT ENCOUNTER: ICD-10-CM

## 2020-01-19 RX ORDER — INDOMETHACIN 50 MG/1
50 CAPSULE ORAL 2 TIMES DAILY PRN
Qty: 60 CAPSULE | Refills: 0 | Status: SHIPPED | OUTPATIENT
Start: 2020-01-19 | End: 2020-01-29 | Stop reason: ALTCHOICE

## 2020-01-26 DIAGNOSIS — I10 ESSENTIAL HYPERTENSION: ICD-10-CM

## 2020-01-27 RX ORDER — AMLODIPINE BESYLATE 5 MG/1
TABLET ORAL
Qty: 90 TABLET | Refills: 3 | Status: SHIPPED | OUTPATIENT
Start: 2020-01-27 | End: 2020-01-29

## 2020-01-29 ENCOUNTER — OFFICE VISIT (OUTPATIENT)
Dept: FAMILY MEDICINE CLINIC | Facility: CLINIC | Age: 70
End: 2020-01-29
Payer: COMMERCIAL

## 2020-01-29 VITALS
HEART RATE: 87 BPM | HEIGHT: 73 IN | TEMPERATURE: 97.6 F | OXYGEN SATURATION: 97 % | DIASTOLIC BLOOD PRESSURE: 62 MMHG | SYSTOLIC BLOOD PRESSURE: 140 MMHG | WEIGHT: 186 LBS | BODY MASS INDEX: 24.65 KG/M2

## 2020-01-29 DIAGNOSIS — M25.551 PAIN OF RIGHT HIP JOINT: ICD-10-CM

## 2020-01-29 DIAGNOSIS — I10 ESSENTIAL HYPERTENSION: ICD-10-CM

## 2020-01-29 DIAGNOSIS — N52.01 ERECTILE DYSFUNCTION DUE TO ARTERIAL INSUFFICIENCY: ICD-10-CM

## 2020-01-29 DIAGNOSIS — Z01.818 PRE-OP EXAMINATION: Primary | ICD-10-CM

## 2020-01-29 PROBLEM — M25.559 HIP PAIN: Status: ACTIVE | Noted: 2020-01-10

## 2020-01-29 PROCEDURE — 1036F TOBACCO NON-USER: CPT | Performed by: PHYSICIAN ASSISTANT

## 2020-01-29 PROCEDURE — 3078F DIAST BP <80 MM HG: CPT | Performed by: PHYSICIAN ASSISTANT

## 2020-01-29 PROCEDURE — 3725F SCREEN DEPRESSION PERFORMED: CPT | Performed by: PHYSICIAN ASSISTANT

## 2020-01-29 PROCEDURE — 3077F SYST BP >= 140 MM HG: CPT | Performed by: PHYSICIAN ASSISTANT

## 2020-01-29 PROCEDURE — 1160F RVW MEDS BY RX/DR IN RCRD: CPT | Performed by: PHYSICIAN ASSISTANT

## 2020-01-29 PROCEDURE — 3008F BODY MASS INDEX DOCD: CPT | Performed by: PHYSICIAN ASSISTANT

## 2020-01-29 PROCEDURE — 99214 OFFICE O/P EST MOD 30 MIN: CPT | Performed by: PHYSICIAN ASSISTANT

## 2020-01-29 RX ORDER — TRAMADOL HYDROCHLORIDE 50 MG/1
50 TABLET ORAL 2 TIMES DAILY
COMMUNITY
End: 2022-01-24 | Stop reason: SDUPTHER

## 2020-01-29 RX ORDER — AMLODIPINE BESYLATE 5 MG/1
5 TABLET ORAL 2 TIMES DAILY
Start: 2020-01-29 | End: 2020-12-11

## 2020-01-29 RX ORDER — SILDENAFIL CITRATE 20 MG/1
TABLET ORAL
Qty: 30 TABLET | Refills: 1 | Status: SHIPPED | OUTPATIENT
Start: 2020-01-29 | End: 2022-01-24

## 2020-01-29 NOTE — PROGRESS NOTES
Subjective:     Zeus Brandt is a 77 y o  male who presents to the office today for a preoperative consultation at the request of surgeon Shonna Elam MD who plans on performing Right total hip replacement posterior approach on February 13  This consultation is requested for the specific conditions prompting preoperative evaluation (i e  because of potential affect on operative risk): HTN  Planned anesthesia: general  The patient has the following known anesthesia issues: past general anesthesia with complications (pt woke up during surgery for his wrist in the past)  Patients bleeding risk: use of Ca-channel blockers (see med list)  Patient does not have objections to receiving blood products if needed  The following portions of the patient's history were reviewed and updated as appropriate:   He  has a past medical history of Hypertension  He   Patient Active Problem List    Diagnosis Date Noted    Pre-op examination 01/29/2020    Hip pain 01/10/2020    Erectile dysfunction due to arterial insufficiency 10/14/2019    Traumatic complete tear of right rotator cuff 04/30/2019    Essential hypertension 03/19/2019    Impaired fasting glucose 03/19/2019    Skin lesion 01/03/2019    Contusion of right chest wall 02/05/2018     He  has a past surgical history that includes Wrist surgery (Right); Mohs surgery (03/26/2019); [place] tooth extraction; and Dental surgery  His family history includes Cancer in his other; Diabetes in his mother  He  reports that he has never smoked  He has never used smokeless tobacco  He reports that he drinks alcohol  He reports that he does not use drugs    Current Outpatient Medications   Medication Sig Dispense Refill    amLODIPine (NORVASC) 5 mg tablet Take 1 tablet (5 mg total) by mouth 2 (two) times a day      traMADol (ULTRAM) 50 mg tablet Take 50 mg by mouth 2 (two) times a day      sildenafil (REVATIO) 20 mg tablet 3 po daily as needed 30 tablet 1     No current facility-administered medications for this visit  Current Outpatient Medications on File Prior to Visit   Medication Sig    traMADol (ULTRAM) 50 mg tablet Take 50 mg by mouth 2 (two) times a day    [DISCONTINUED] amLODIPine (NORVASC) 5 mg tablet TAKE 1 TABLET BY MOUTH EVERY DAY (Patient taking differently: Take 5 mg by mouth 2 (two) times a day )    [DISCONTINUED] indomethacin (INDOCIN) 50 mg capsule TAKE 1 CAPSULE (50 MG TOTAL) BY MOUTH 2 (TWO) TIMES A DAY AS NEEDED FOR MODERATE PAIN    [DISCONTINUED] fluticasone (FLONASE) 50 mcg/act nasal spray 1 spray into each nostril daily    [DISCONTINUED] meloxicam (MOBIC) 15 mg tablet TAKE 1 TABLET BY MOUTH EVERY DAY    [DISCONTINUED] sildenafil (REVATIO) 20 mg tablet Three tablets daily, as needed for erectile dysfunction     No current facility-administered medications on file prior to visit  He is allergic to tetracyclines & related and demerol [meperidine]       Review of Systems  Pertinent items are noted in HPI  Objective:     /62   Pulse 87   Temp 97 6 °F (36 4 °C)   Ht 6' 1" (1 854 m)   Wt 84 4 kg (186 lb)   SpO2 97%   BMI 24 54 kg/m²   General appearance: alert and oriented, in no acute distress  Head: Normocephalic, without obvious abnormality, atraumatic  Eyes: negative  Ears: normal TM's and external ear canals both ears  Nose: Nares normal  Septum midline  Mucosa normal  No drainage or sinus tenderness    Throat: lips, mucosa, and tongue normal; teeth and gums normal and plates in place upper teeth  Neck: no adenopathy, no carotid bruit, no JVD, supple, symmetrical, trachea midline and thyroid not enlarged, symmetric, no tenderness/mass/nodules  Back: negative  Lungs: clear to auscultation bilaterally  Chest wall: no tenderness  Heart: regular rate and rhythm, S1, S2 normal, no murmur, click, rub or gallop  Abdomen: soft, non-tender; bowel sounds normal; no masses,  no organomegaly  Extremities: extremities normal, warm and well-perfused; no cyanosis, clubbing, or edema  Pulses: 2+ and symmetric  Skin: Skin color, texture, turgor normal  No rashes or lesions  Lymph nodes: Cervical adenopathy: none  Neurologic: Grossly normal    Predictors of intubation difficulty:   Morbid obesity? no   Anatomically abnormal facies? no   Prominent incisors? no   Receding mandible? no   Short, thick neck? no   Neck range of motion: normal   Mallampati score: II (hard and soft palate, upper portion of tonsils anduvula visible)   Dentition: upper plates    Cardiographics  ECG: no change since previous ECG dated 05/2019, normal sinus rhythm, incomplete RBBB, T wave abnormality,, consider anterolateral ischemia  Echocardiogram: not done    Imaging  Chest x-ray: normal     Lab Review   No visits with results within 2 Month(s) from this visit  Latest known visit with results is:   Appointment on 04/30/2019   Component Date Value    Sodium 04/30/2019 142     Potassium 04/30/2019 4 0     Chloride 04/30/2019 106     CO2 04/30/2019 25     ANION GAP 04/30/2019 11     BUN 04/30/2019 29*    Creatinine 04/30/2019 1 18     Glucose 04/30/2019 79     Calcium 04/30/2019 9 1     eGFR 04/30/2019 63     Hemoglobin A1C 04/30/2019 4 9     EAG 04/30/2019 94         Assessment:     77 y o  male with planned surgery as above  Known risk factors for perioperative complications: None    Difficulty with intubation is not anticipated  Cardiac Risk Estimation: low, cleared for surgery    Current medications which may produce withdrawal symptoms if withheld perioperatively: none      Plan:     1  Preoperative workup as follows none  2  Change in medication regimen before surgery: discontinue NSAIDs (Indomethacin) 14 days before surgery  3  Prophylaxis for cardiac events with perioperative beta-blockers: not indicated  4  Invasive hemodynamic monitoring perioperatively: not indicated    5  Deep vein thrombosis prophylaxis postoperatively:regimen to be chosen by surgical team   6  Surveillance for postoperative MI with ECG immediately postoperatively and on postoperative days 1 and 2 AND troponin levels 24 hours postoperatively and on day 4 or hospital discharge (whichever comes first): not indicated    7  Other measures: none

## 2020-01-30 ENCOUNTER — TELEPHONE (OUTPATIENT)
Dept: FAMILY MEDICINE CLINIC | Facility: CLINIC | Age: 70
End: 2020-01-30

## 2020-01-30 NOTE — TELEPHONE ENCOUNTER
Dr Pricilla Todd office called - they said the pre surgical note all leads up to clearance but never actually says "cleared for surgery"

## 2020-12-09 ENCOUNTER — OFFICE VISIT (OUTPATIENT)
Dept: FAMILY MEDICINE CLINIC | Facility: CLINIC | Age: 70
End: 2020-12-09
Payer: COMMERCIAL

## 2020-12-09 VITALS
SYSTOLIC BLOOD PRESSURE: 146 MMHG | TEMPERATURE: 97.9 F | BODY MASS INDEX: 25.39 KG/M2 | DIASTOLIC BLOOD PRESSURE: 72 MMHG | HEIGHT: 73 IN | OXYGEN SATURATION: 96 % | HEART RATE: 58 BPM | WEIGHT: 191.6 LBS

## 2020-12-09 DIAGNOSIS — Z00.00 HEALTH CARE MAINTENANCE: ICD-10-CM

## 2020-12-09 DIAGNOSIS — L98.9 SKIN LESION OF RIGHT LEG: ICD-10-CM

## 2020-12-09 DIAGNOSIS — Z12.11 SCREEN FOR COLON CANCER: ICD-10-CM

## 2020-12-09 DIAGNOSIS — K40.90 NON-RECURRENT UNILATERAL INGUINAL HERNIA WITHOUT OBSTRUCTION OR GANGRENE: Primary | ICD-10-CM

## 2020-12-09 DIAGNOSIS — S46.011D TRAUMATIC COMPLETE TEAR OF RIGHT ROTATOR CUFF, SUBSEQUENT ENCOUNTER: ICD-10-CM

## 2020-12-09 PROBLEM — Z01.818 PRE-OP EXAMINATION: Status: RESOLVED | Noted: 2020-01-29 | Resolved: 2020-12-09

## 2020-12-09 PROCEDURE — 1160F RVW MEDS BY RX/DR IN RCRD: CPT | Performed by: FAMILY MEDICINE

## 2020-12-09 PROCEDURE — 3078F DIAST BP <80 MM HG: CPT | Performed by: FAMILY MEDICINE

## 2020-12-09 PROCEDURE — 1036F TOBACCO NON-USER: CPT | Performed by: FAMILY MEDICINE

## 2020-12-09 PROCEDURE — 1170F FXNL STATUS ASSESSED: CPT | Performed by: FAMILY MEDICINE

## 2020-12-09 PROCEDURE — 3008F BODY MASS INDEX DOCD: CPT | Performed by: FAMILY MEDICINE

## 2020-12-09 PROCEDURE — 3288F FALL RISK ASSESSMENT DOCD: CPT | Performed by: FAMILY MEDICINE

## 2020-12-09 PROCEDURE — 99213 OFFICE O/P EST LOW 20 MIN: CPT | Performed by: FAMILY MEDICINE

## 2020-12-09 PROCEDURE — 3725F SCREEN DEPRESSION PERFORMED: CPT | Performed by: FAMILY MEDICINE

## 2020-12-09 PROCEDURE — 3077F SYST BP >= 140 MM HG: CPT | Performed by: FAMILY MEDICINE

## 2020-12-09 PROCEDURE — 1125F AMNT PAIN NOTED PAIN PRSNT: CPT | Performed by: FAMILY MEDICINE

## 2020-12-09 PROCEDURE — G0439 PPPS, SUBSEQ VISIT: HCPCS | Performed by: FAMILY MEDICINE

## 2020-12-09 PROCEDURE — 1101F PT FALLS ASSESS-DOCD LE1/YR: CPT | Performed by: FAMILY MEDICINE

## 2020-12-09 RX ORDER — MULTIVITAMIN
1 TABLET ORAL DAILY
COMMUNITY

## 2020-12-09 RX ORDER — MELOXICAM 15 MG/1
TABLET ORAL
COMMUNITY

## 2020-12-09 RX ORDER — METHYLPREDNISOLONE 4 MG/1
TABLET ORAL
Qty: 21 TABLET | Refills: 0 | Status: SHIPPED | OUTPATIENT
Start: 2020-12-09 | End: 2020-12-15

## 2020-12-10 DIAGNOSIS — I10 ESSENTIAL HYPERTENSION: ICD-10-CM

## 2020-12-11 RX ORDER — AMLODIPINE BESYLATE 5 MG/1
TABLET ORAL
Qty: 90 TABLET | Refills: 3 | Status: SHIPPED | OUTPATIENT
Start: 2020-12-11 | End: 2021-12-08

## 2020-12-14 ENCOUNTER — TELEPHONE (OUTPATIENT)
Dept: SURGERY | Facility: CLINIC | Age: 70
End: 2020-12-14

## 2021-01-07 ENCOUNTER — TELEPHONE (OUTPATIENT)
Dept: FAMILY MEDICINE CLINIC | Facility: CLINIC | Age: 71
End: 2021-01-07

## 2021-01-11 ENCOUNTER — TELEPHONE (OUTPATIENT)
Dept: FAMILY MEDICINE CLINIC | Facility: CLINIC | Age: 71
End: 2021-01-11

## 2021-01-11 NOTE — TELEPHONE ENCOUNTER
Good morning Dr Rosa Maria Polk! Jaimee Javier pre-op form is in your folder, his b/w is in care everywhere from 1/7/21 , I spoke with with pt and he stated no EKG was required for his surgery  I can e-mail it to you if you want  Please let me know

## 2021-03-19 ENCOUNTER — TELEPHONE (OUTPATIENT)
Dept: FAMILY MEDICINE CLINIC | Facility: CLINIC | Age: 71
End: 2021-03-19

## 2021-03-19 NOTE — TELEPHONE ENCOUNTER
Left message for patient to return call  Cologuard ordered 12/2020 - did patient receive kit? Does he plan to complete cologuard?

## 2021-04-27 ENCOUNTER — TELEPHONE (OUTPATIENT)
Dept: FAMILY MEDICINE CLINIC | Facility: CLINIC | Age: 71
End: 2021-04-27

## 2021-04-27 NOTE — TELEPHONE ENCOUNTER
Pt walk in today  Experienced what he thought was heartburn, he had nausea, chest pain mid chest  Took something for heartburn and it was relieved  It restarted, he took a peppermint medication, relieved temporarily he was doing some errands and the pain became very strong  He walked in today for an appt or advice  This is new onset, patient never experienced this before  Spoke with providers and they recommended patient go to the ER  Patient agreed

## 2021-07-06 ENCOUNTER — VBI (OUTPATIENT)
Dept: ADMINISTRATIVE | Facility: OTHER | Age: 71
End: 2021-07-06

## 2021-07-28 ENCOUNTER — VBI (OUTPATIENT)
Dept: ADMINISTRATIVE | Facility: OTHER | Age: 71
End: 2021-07-28

## 2021-12-08 ENCOUNTER — TELEPHONE (OUTPATIENT)
Dept: FAMILY MEDICINE CLINIC | Facility: CLINIC | Age: 71
End: 2021-12-08

## 2021-12-08 DIAGNOSIS — I10 ESSENTIAL HYPERTENSION: ICD-10-CM

## 2021-12-08 RX ORDER — AMLODIPINE BESYLATE 5 MG/1
TABLET ORAL
Qty: 90 TABLET | Refills: 0 | Status: SHIPPED | OUTPATIENT
Start: 2021-12-08 | End: 2022-01-24 | Stop reason: SDUPTHER

## 2022-01-24 ENCOUNTER — OFFICE VISIT (OUTPATIENT)
Dept: FAMILY MEDICINE CLINIC | Facility: CLINIC | Age: 72
End: 2022-01-24
Payer: COMMERCIAL

## 2022-01-24 VITALS
RESPIRATION RATE: 16 BRPM | BODY MASS INDEX: 25.79 KG/M2 | SYSTOLIC BLOOD PRESSURE: 140 MMHG | DIASTOLIC BLOOD PRESSURE: 80 MMHG | HEIGHT: 73 IN | WEIGHT: 194.6 LBS | OXYGEN SATURATION: 98 % | TEMPERATURE: 97 F | HEART RATE: 80 BPM

## 2022-01-24 DIAGNOSIS — N52.01 ERECTILE DYSFUNCTION DUE TO ARTERIAL INSUFFICIENCY: ICD-10-CM

## 2022-01-24 DIAGNOSIS — Z12.11 SCREEN FOR COLON CANCER: ICD-10-CM

## 2022-01-24 DIAGNOSIS — I10 ESSENTIAL HYPERTENSION: ICD-10-CM

## 2022-01-24 DIAGNOSIS — Z00.00 HEALTH CARE MAINTENANCE: Primary | ICD-10-CM

## 2022-01-24 DIAGNOSIS — Z13.6 SCREENING FOR CARDIOVASCULAR CONDITION: ICD-10-CM

## 2022-01-24 DIAGNOSIS — R73.01 IMPAIRED FASTING GLUCOSE: ICD-10-CM

## 2022-01-24 DIAGNOSIS — Z12.5 SCREENING PSA (PROSTATE SPECIFIC ANTIGEN): ICD-10-CM

## 2022-01-24 DIAGNOSIS — S46.011D TRAUMATIC COMPLETE TEAR OF RIGHT ROTATOR CUFF, SUBSEQUENT ENCOUNTER: ICD-10-CM

## 2022-01-24 PROCEDURE — 3725F SCREEN DEPRESSION PERFORMED: CPT | Performed by: FAMILY MEDICINE

## 2022-01-24 PROCEDURE — 1125F AMNT PAIN NOTED PAIN PRSNT: CPT | Performed by: FAMILY MEDICINE

## 2022-01-24 PROCEDURE — 1170F FXNL STATUS ASSESSED: CPT | Performed by: FAMILY MEDICINE

## 2022-01-24 PROCEDURE — 1036F TOBACCO NON-USER: CPT | Performed by: FAMILY MEDICINE

## 2022-01-24 PROCEDURE — G0439 PPPS, SUBSEQ VISIT: HCPCS | Performed by: FAMILY MEDICINE

## 2022-01-24 PROCEDURE — 1160F RVW MEDS BY RX/DR IN RCRD: CPT | Performed by: FAMILY MEDICINE

## 2022-01-24 PROCEDURE — 3288F FALL RISK ASSESSMENT DOCD: CPT | Performed by: FAMILY MEDICINE

## 2022-01-24 PROCEDURE — 3008F BODY MASS INDEX DOCD: CPT | Performed by: FAMILY MEDICINE

## 2022-01-24 PROCEDURE — 99214 OFFICE O/P EST MOD 30 MIN: CPT | Performed by: FAMILY MEDICINE

## 2022-01-24 RX ORDER — TRAMADOL HYDROCHLORIDE 50 MG/1
50 TABLET ORAL 2 TIMES DAILY
Qty: 50 TABLET | Refills: 0 | Status: SHIPPED | OUTPATIENT
Start: 2022-01-24

## 2022-01-24 RX ORDER — AMLODIPINE BESYLATE 5 MG/1
5 TABLET ORAL DAILY
Qty: 90 TABLET | Refills: 3 | Status: SHIPPED | OUTPATIENT
Start: 2022-01-24

## 2022-01-24 RX ORDER — DICLOFENAC SODIUM 75 MG/1
TABLET, DELAYED RELEASE ORAL
COMMUNITY

## 2022-01-24 RX ORDER — TADALAFIL 20 MG
20 TABLET ORAL DAILY PRN
Qty: 3 TABLET | Refills: 0 | Status: SHIPPED | OUTPATIENT
Start: 2022-01-24

## 2022-01-24 NOTE — PATIENT INSTRUCTIONS

## 2022-01-24 NOTE — PROGRESS NOTES
Assessment/Plan:         Problem List Items Addressed This Visit        Endocrine    Impaired fasting glucose     Check CMP, low-cholesterol diet            Cardiovascular and Mediastinum    Essential hypertension      Controlled, continue the amlodipine  Relevant Medications    amLODIPine (NORVASC) 5 mg tablet    Other Relevant Orders    CBC    Comprehensive metabolic panel    Erectile dysfunction due to arterial insufficiency       Trial of Cialis Brand 20 mg as needed  Relevant Medications    Cialis 20 MG tablet       Musculoskeletal and Integument    Traumatic complete tear of right rotator cuff       Refill his tramadol to take as needed         Relevant Medications    traMADol (ULTRAM) 50 mg tablet       Other    BMI 25 0-25 9,adult      Other Visit Diagnoses     Health care maintenance    -  Primary    Screen for colon cancer        Relevant Orders    Cologuard    Screening for cardiovascular condition        Relevant Orders    Lipid panel    Screening PSA (prostate specific antigen)        Relevant Orders    PSA, Total Screen            Subjective:      Patient ID: Kelsi La is a 80 y o  male  Patient comes in today for checkup states he is generally doing well, he states that he has tried the generic Viagra without success, he would like to try Cialis brand name  The following portions of the patient's history were reviewed and updated as appropriate:   Past Medical History:  He has a past medical history of Hypertension, Rotator cuff tear, left, and Rotator cuff tear, right ,  _______________________________________________________________________  Medical Problems:  does not have any pertinent problems on file ,  _______________________________________________________________________  Past Surgical History:   has a past surgical history that includes Wrist surgery (Right); Mohs surgery (03/26/2019); [place] tooth extraction; Dental surgery;  Hip surgery (Right, 02/13/2019); and Excision basal cell carcinoma  ,  _______________________________________________________________________  Family History:  family history includes Cancer in his other; Diabetes in his mother ,  _______________________________________________________________________  Social History:   reports that he has never smoked  He has never used smokeless tobacco  He reports current alcohol use  He reports that he does not use drugs  ,  _______________________________________________________________________  Allergies:  is allergic to tetracyclines & related and demerol [meperidine]     _______________________________________________________________________  Current Outpatient Medications   Medication Sig Dispense Refill    amLODIPine (NORVASC) 5 mg tablet Take 1 tablet (5 mg total) by mouth daily 90 tablet 3    diclofenac (VOLTAREN) 75 mg EC tablet diclofenac sodium 75 mg tablet,delayed release      Garlic 957 MG TABS Take by mouth      Multiple Vitamin (multivitamin) tablet Take 1 tablet by mouth daily      Cialis 20 MG tablet Take 1 tablet (20 mg total) by mouth daily as needed for erectile dysfunction 3 tablet 0    meloxicam (MOBIC) 15 mg tablet meloxicam 15 mg tablet (Patient not taking: Reported on 1/24/2022)      traMADol (ULTRAM) 50 mg tablet Take 1 tablet (50 mg total) by mouth 2 (two) times a day 50 tablet 0     No current facility-administered medications for this visit      _______________________________________________________________________  Review of Systems   Constitutional: Negative for chills, fatigue and fever  HENT: Negative for congestion, ear pain, hearing loss, postnasal drip, rhinorrhea and sore throat  Eyes: Negative for pain and visual disturbance  Respiratory: Negative for chest tightness, shortness of breath and wheezing  Cardiovascular: Negative for chest pain and leg swelling     Gastrointestinal: Negative for abdominal distention, abdominal pain, constipation, diarrhea and vomiting  Endocrine: Negative for cold intolerance and heat intolerance  Genitourinary: Negative for difficulty urinating, frequency and urgency  Musculoskeletal: Negative for arthralgias and gait problem  Skin: Negative for color change  Neurological: Negative for dizziness, tremors, syncope, numbness and headaches  Hematological: Negative for adenopathy  Psychiatric/Behavioral: Negative for agitation, confusion and sleep disturbance  The patient is not nervous/anxious  Objective:  Vitals:    01/24/22 1305   BP: 140/80   Pulse: 80   Resp: 16   Temp: (!) 97 °F (36 1 °C)   SpO2: 98%   Weight: 88 3 kg (194 lb 9 6 oz)   Height: 6' 1" (1 854 m)     Body mass index is 25 67 kg/m²  Physical Exam  Vitals and nursing note reviewed  Constitutional:       Appearance: He is well-developed  HENT:      Head: Normocephalic  Eyes:      General: No scleral icterus  Conjunctiva/sclera: Conjunctivae normal    Neck:      Thyroid: No thyromegaly  Cardiovascular:      Rate and Rhythm: Normal rate and regular rhythm  Heart sounds: Normal heart sounds  No murmur heard  Pulmonary:      Effort: Pulmonary effort is normal  No respiratory distress  Breath sounds: Normal breath sounds  No wheezing  Abdominal:      General: Bowel sounds are normal  There is no distension  Palpations: Abdomen is soft  Tenderness: There is no abdominal tenderness  Musculoskeletal:         General: No tenderness  Normal range of motion  Cervical back: Normal range of motion  Lymphadenopathy:      Cervical: No cervical adenopathy  Skin:     General: Skin is warm and dry  Coloration: Skin is not pale  Findings: No rash  Neurological:      Mental Status: He is alert and oriented to person, place, and time  Cranial Nerves: No cranial nerve deficit     Psychiatric:         Behavior: Behavior normal

## 2022-02-07 ENCOUNTER — APPOINTMENT (OUTPATIENT)
Dept: LAB | Facility: HOSPITAL | Age: 72
End: 2022-02-07
Payer: COMMERCIAL

## 2022-02-07 DIAGNOSIS — Z12.5 SCREENING PSA (PROSTATE SPECIFIC ANTIGEN): ICD-10-CM

## 2022-02-07 DIAGNOSIS — I10 ESSENTIAL HYPERTENSION: ICD-10-CM

## 2022-02-07 DIAGNOSIS — Z13.6 SCREENING FOR CARDIOVASCULAR CONDITION: ICD-10-CM

## 2022-02-07 LAB
ALBUMIN SERPL BCP-MCNC: 3.9 G/DL (ref 3.5–5)
ALP SERPL-CCNC: 153 U/L (ref 46–116)
ALT SERPL W P-5'-P-CCNC: 58 U/L (ref 12–78)
ANION GAP SERPL CALCULATED.3IONS-SCNC: 8 MMOL/L (ref 4–13)
AST SERPL W P-5'-P-CCNC: 27 U/L (ref 5–45)
BILIRUB SERPL-MCNC: 0.79 MG/DL (ref 0.2–1)
BUN SERPL-MCNC: 25 MG/DL (ref 5–25)
CALCIUM SERPL-MCNC: 8.9 MG/DL (ref 8.3–10.1)
CHLORIDE SERPL-SCNC: 107 MMOL/L (ref 100–108)
CHOLEST SERPL-MCNC: 174 MG/DL
CO2 SERPL-SCNC: 26 MMOL/L (ref 21–32)
CREAT SERPL-MCNC: 1.38 MG/DL (ref 0.6–1.3)
ERYTHROCYTE [DISTWIDTH] IN BLOOD BY AUTOMATED COUNT: 12.5 % (ref 11.6–15.1)
GFR SERPL CREATININE-BSD FRML MDRD: 51 ML/MIN/1.73SQ M
GLUCOSE P FAST SERPL-MCNC: 99 MG/DL (ref 65–99)
HCT VFR BLD AUTO: 44.1 % (ref 36.5–49.3)
HDLC SERPL-MCNC: 48 MG/DL
HGB BLD-MCNC: 15.4 G/DL (ref 12–17)
LDLC SERPL CALC-MCNC: 107 MG/DL (ref 0–100)
MCH RBC QN AUTO: 32.4 PG (ref 26.8–34.3)
MCHC RBC AUTO-ENTMCNC: 34.9 G/DL (ref 31.4–37.4)
MCV RBC AUTO: 93 FL (ref 82–98)
NONHDLC SERPL-MCNC: 126 MG/DL
PLATELET # BLD AUTO: 199 THOUSANDS/UL (ref 149–390)
PMV BLD AUTO: 9.8 FL (ref 8.9–12.7)
POTASSIUM SERPL-SCNC: 4.5 MMOL/L (ref 3.5–5.3)
PROT SERPL-MCNC: 7.2 G/DL (ref 6.4–8.2)
PSA SERPL-MCNC: 0.2 NG/ML (ref 0–4)
RBC # BLD AUTO: 4.76 MILLION/UL (ref 3.88–5.62)
SODIUM SERPL-SCNC: 141 MMOL/L (ref 136–145)
TRIGL SERPL-MCNC: 95 MG/DL
WBC # BLD AUTO: 5.27 THOUSAND/UL (ref 4.31–10.16)

## 2022-02-07 PROCEDURE — 85027 COMPLETE CBC AUTOMATED: CPT

## 2022-02-07 PROCEDURE — 80061 LIPID PANEL: CPT

## 2022-02-07 PROCEDURE — 36415 COLL VENOUS BLD VENIPUNCTURE: CPT

## 2022-02-07 PROCEDURE — G0103 PSA SCREENING: HCPCS

## 2022-02-07 PROCEDURE — 80053 COMPREHEN METABOLIC PANEL: CPT

## 2022-03-23 ENCOUNTER — TELEPHONE (OUTPATIENT)
Dept: FAMILY MEDICINE CLINIC | Facility: CLINIC | Age: 72
End: 2022-03-23

## 2022-03-23 DIAGNOSIS — Z12.11 SCREEN FOR COLON CANCER: Primary | ICD-10-CM

## 2022-03-23 NOTE — TELEPHONE ENCOUNTER
Called patient - notified him that he is overdue for colonoscopy  He was sent a cologuard in January and stated he no longer has this - patient requested we send him a new one

## 2022-09-07 ENCOUNTER — VBI (OUTPATIENT)
Dept: ADMINISTRATIVE | Facility: OTHER | Age: 72
End: 2022-09-07

## 2023-01-17 ENCOUNTER — TELEPHONE (OUTPATIENT)
Dept: FAMILY MEDICINE CLINIC | Facility: CLINIC | Age: 73
End: 2023-01-17

## 2023-01-17 DIAGNOSIS — Z12.11 SCREEN FOR COLON CANCER: Primary | ICD-10-CM

## 2023-01-17 NOTE — TELEPHONE ENCOUNTER
Called pt in regards to cologuard kit, he states he did not receive the kit  Can you place new order for him?

## 2023-03-28 DIAGNOSIS — I10 ESSENTIAL HYPERTENSION: ICD-10-CM

## 2023-03-29 RX ORDER — AMLODIPINE BESYLATE 5 MG/1
5 TABLET ORAL DAILY
Qty: 90 TABLET | Refills: 1 | Status: SHIPPED | OUTPATIENT
Start: 2023-03-29

## 2023-06-10 ENCOUNTER — HOSPITAL ENCOUNTER (EMERGENCY)
Facility: HOSPITAL | Age: 73
Discharge: HOME | End: 2023-06-10
Attending: EMERGENCY MEDICINE
Payer: COMMERCIAL

## 2023-06-10 VITALS
RESPIRATION RATE: 18 BRPM | SYSTOLIC BLOOD PRESSURE: 154 MMHG | DIASTOLIC BLOOD PRESSURE: 77 MMHG | OXYGEN SATURATION: 96 % | TEMPERATURE: 98.2 F | WEIGHT: 190 LBS | BODY MASS INDEX: 25.18 KG/M2 | HEART RATE: 97 BPM | HEIGHT: 73 IN

## 2023-06-10 DIAGNOSIS — R04.0 EPISTAXIS: Primary | ICD-10-CM

## 2023-06-10 DIAGNOSIS — S01.21XA: ICD-10-CM

## 2023-06-10 PROCEDURE — 99282 EMERGENCY DEPT VISIT SF MDM: CPT

## 2023-06-10 RX ADMIN — SILVER NITRATE APPLICATORS 1 APPLICATOR: 25; 75 STICK TOPICAL at 17:19

## 2023-06-10 NOTE — ED PROVIDER NOTES
"Pt Name: Anna Olmos  MRN: [id number]  [place] 1950  Age/Sex: 71 y o  male  Date of evaluation: 6/10/2023  PCP: Nikia Linton, [address] [place] [address]    Chief Complaint   Patient presents with   • Laceration     Patient states \"I was cutting my nose hairs with scissor and cut the inside of my nose  \" Bleeding controlled  HPI    71 y o  male presenting with nosebleed  Patient states he was attempted to cut his nose hairs with scissors, accidentally stabbed his left nostril with the point of the scissors  He notes immediate bleeding, this persisted for about 2 hours despite the use of pressure  He denies any other pain or injuries, headaches, chest pain, shortness of breath, other symptoms  HPI      Past Medical and Surgical History    Past Medical History:   Diagnosis Date   • Hypertension    • Rotator cuff tear, left    • Rotator cuff tear, right        Past Surgical History:   Procedure Laterality Date   • BASAL CELL CARCINOMA EXCISION     • DENTAL SURGERY     • HIP SURGERY Right 02/13/2019   • MOHS SURGERY  03/26/2019    on nose with skin graft from left ear   • WISDOM TOOTH EXTRACTION     • WRIST SURGERY Right     Tendon and muscle repair       Family History   Problem Relation Age of Onset   • Diabetes Mother    • Cancer Other        Social History     Tobacco Use   • Smoking status: Never   • Smokeless tobacco: Never   Substance Use Topics   • Alcohol use: Yes     Comment: special occasions   • Drug use: No           Allergies    Allergies   Allergen Reactions   • Tetracyclines & Related Anaphylaxis     Sores appeared in mouth and genitals   • Demerol [Meperidine] Vomiting       Home Medications    Prior to Admission medications    Medication Sig Start Date End Date Taking? Authorizing Provider   amLODIPine (NORVASC) 5 mg tablet TAKE 1 TABLET (5 MG TOTAL) BY MOUTH DAILY   3/29/23   Johnie Alvarez DO   Cialis 20 MG tablet Take 1 tablet (20 mg total) by mouth daily as needed for erectile " dysfunction 1/24/22   Gabby Staff, DO   diclofenac (VOLTAREN) 75 mg EC tablet diclofenac sodium 75 mg tablet,delayed release    Historical Provider, MD   Garlic 025 MG TABS Take by mouth    Historical Provider, MD   meloxicam (MOBIC) 15 mg tablet meloxicam 15 mg tablet  Patient not taking: Reported on 1/24/2022    Historical Provider, MD   Multiple Vitamin (multivitamin) tablet Take 1 tablet by mouth daily    Historical Provider, MD   traMADol (ULTRAM) 50 mg tablet Take 1 tablet (50 mg total) by mouth 2 (two) times a day 1/24/22   Gabby Staff, DO           Review of Systems    Review of Systems   Constitutional: Negative for appetite change, chills and diaphoresis  HENT: Positive for nosebleeds  Negative for drooling, facial swelling, trouble swallowing and voice change  Respiratory: Negative for apnea, shortness of breath and wheezing  Cardiovascular: Negative for chest pain and leg swelling  Gastrointestinal: Negative for abdominal distention, abdominal pain, diarrhea, nausea and vomiting  Genitourinary: Negative for dysuria and urgency  Musculoskeletal: Negative for arthralgias, back pain, gait problem and neck pain  Skin: Negative for color change, rash and wound  Neurological: Negative for seizures, speech difficulty, weakness and headaches  Psychiatric/Behavioral: Negative for agitation, behavioral problems and dysphoric mood  The patient is not nervous/anxious  All other systems reviewed and negative  Physical Exam      ED Triage Vitals [06/10/23 1634]   Temperature Pulse Respirations Blood Pressure SpO2   98 2 °F (36 8 °C) 97 18 154/77 96 %      Temp Source Heart Rate Source Patient Position - Orthostatic VS BP Location FiO2 (%)   Tympanic Monitor Sitting Left arm --      Pain Score       --               Physical Exam  Vitals and nursing note reviewed  Constitutional:       General: He is not in acute distress  Appearance: He is well-developed   He is not ill-appearing, toxic-appearing or diaphoretic  HENT:      Head: Normocephalic and atraumatic  Right Ear: External ear normal       Left Ear: External ear normal       Nose:      Comments: Small laceration noted overlying the anterior septum in the left nostril  Hemostatic at time of my exam  Eyes:      Conjunctiva/sclera: Conjunctivae normal       Pupils: Pupils are equal, round, and reactive to light  Neck:      Trachea: No tracheal deviation  Cardiovascular:      Rate and Rhythm: Normal rate and regular rhythm  Pulses: Normal pulses  Pulmonary:      Effort: Pulmonary effort is normal  No respiratory distress  Abdominal:      General: There is no distension  Palpations: Abdomen is soft  Tenderness: There is no abdominal tenderness  There is no guarding or rebound  Musculoskeletal:         General: No deformity  Normal range of motion  Cervical back: Normal range of motion and neck supple  Skin:     General: Skin is warm and dry  Capillary Refill: Capillary refill takes less than 2 seconds  Findings: No rash  Neurological:      Mental Status: He is alert and oriented to person, place, and time  Psychiatric:         Behavior: Behavior normal          Thought Content: Thought content normal          Judgment: Judgment normal               Diagnostic Results      Labs:    Results Reviewed     None          All labs reviewed and utilized in the medical decision making process    Radiology:    No orders to display       All radiology studies independently viewed by me and interpreted by the radiologist     Procedure    Procedures        ED Course of Care and Re-Assessments      After discussion of risks and benefits, wound cauterized with silver nitrate to help reduce repeat bleeding    Tolerated well without complication    Medications   silver nitrate-potassium nitrate (ARZOL SILVER NITRATE) 56-19 % applicator 1 applicator (1 applicator Topical Given 6/10/23 3175) FINAL IMPRESSION    Final diagnoses:   Epistaxis   Simple laceration of nose         DISPOSITION/PLAN    Presentation as above with small laceration of the nose leading to epistaxis  Vital signs and examination overall reassuring  Very low suspicion for significant blood loss, symptomatic anemia, other acute life threat  Cauterized as above, discharged with strict return precautions, follow-up primary care doctor and otolaryngology as needed  Time reflects when diagnosis was documented in both MDM as applicable and the Disposition within this note     Time User Action Codes Description Comment    6/10/2023  5:19 PM Belkys Constant Add [R04 0] Epistaxis     6/10/2023  5:19 PM Belkys Constant Add [S01 21XA] Simple laceration of nose       ED Disposition     ED Disposition   Discharge    Condition   Stable    Date/Time   Sat Erik 10, 2023  5:19 PM    Comment   Ysabel Hernandez discharge to home/self care  Follow-up Information     Follow up With Specialties Details Why Contact Info Additional [address] [facility] Emergency Department Emergency Medicine Go to  If symptoms worsen [address] [facility] [address] [facility] Emergency Department, [address] [place], [place], [address] [facility], DO Family Medicine Call  As needed [address]  [address]  [place] [address], [address] Otolaryngology Call  As needed [address] [place] [phone]            [facility]:    [facility]  [address] [facility] [address] [phone]  Go to   If symptoms worsen    Ana Talamantes DO  [address]  [address]  [facility] "    Call   As needed    [facility] [address]  [phone]  Call   As needed      DISCHARGE MEDICATIONS:    Discharge Medication List as of 6/10/2023  5:20 PM      CONTINUE these medications which have NOT CHANGED    Details   amLODIPine (NORVASC) 5 mg tablet TAKE 1 TABLET (5 MG TOTAL) BY MOUTH DAILY  , Starting Wed 3/29/2023, Normal      Cialis 20 MG tablet Take 1 tablet (20 mg total) by mouth daily as needed for erectile dysfunction, Starting Mon 1/24/2022, Print      diclofenac (VOLTAREN) 75 mg EC tablet diclofenac sodium 75 mg tablet,delayed release, Historical Med      Garlic 201 MG TABS Take by mouth, Historical Med      meloxicam (MOBIC) 15 mg tablet meloxicam 15 mg tablet, Historical Med      Multiple Vitamin (multivitamin) tablet Take 1 tablet by mouth daily, Historical Med      traMADol (ULTRAM) 50 mg tablet Take 1 tablet (50 mg total) by mouth 2 (two) times a day, Starting Mon 1/24/2022, Normal             No discharge procedures on file  Marrian Schaumann, MD    Portions of the record may have been created with voice recognition software  Occasional wrong word or \"sound alike\" substitutions may have occurred due to the inherent limitations of voice recognition software    Please read the chart carefully and recognize, using context, where substitutions have occurred     Marrian Schaumann, MD  06/10/23 0340    "

## 2023-08-25 ENCOUNTER — VBI (OUTPATIENT)
Dept: ADMINISTRATIVE | Facility: OTHER | Age: 73
End: 2023-08-25

## 2023-10-16 DIAGNOSIS — I10 ESSENTIAL HYPERTENSION: ICD-10-CM

## 2023-10-16 RX ORDER — AMLODIPINE BESYLATE 5 MG/1
5 TABLET ORAL DAILY
Qty: 90 TABLET | Refills: 0 | Status: SHIPPED | OUTPATIENT
Start: 2023-10-16

## 2023-10-16 NOTE — TELEPHONE ENCOUNTER
T/c from pt - pt called to set up his AWV - next available is 1/5/24 - pt scheduled but he has only 4 tabs of amlodipine left - is requesting a refill to get him through until the appt or can you squeeze him in anywhere sooner? Please advise.

## 2023-11-08 ENCOUNTER — VBI (OUTPATIENT)
Dept: ADMINISTRATIVE | Facility: OTHER | Age: 73
End: 2023-11-08

## 2023-12-28 ENCOUNTER — RA CDI HCC (OUTPATIENT)
Dept: OTHER | Facility: HOSPITAL | Age: 73
End: 2023-12-28

## 2023-12-28 NOTE — PROGRESS NOTES
HCC coding opportunities       Chart reviewed, no opportunity found: CHART REVIEWED, NO OPPORTUNITY FOUND   [facility] Review      Patients Insurance     Medicare Insurance: [facility] [employer]age

## 2024-01-05 ENCOUNTER — APPOINTMENT (OUTPATIENT)
Dept: LAB | Facility: HOSPITAL | Age: 74
End: 2024-01-05
Payer: COMMERCIAL

## 2024-01-05 ENCOUNTER — RA CDI HCC (OUTPATIENT)
Dept: OTHER | Facility: HOSPITAL | Age: 74
End: 2024-01-05

## 2024-01-05 ENCOUNTER — OFFICE VISIT (OUTPATIENT)
Dept: FAMILY MEDICINE CLINIC | Facility: CLINIC | Age: 74
End: 2024-01-05
Payer: COMMERCIAL

## 2024-01-05 VITALS
BODY MASS INDEX: 23.99 KG/M2 | DIASTOLIC BLOOD PRESSURE: 80 MMHG | TEMPERATURE: 97.2 F | HEART RATE: 76 BPM | SYSTOLIC BLOOD PRESSURE: 120 MMHG | OXYGEN SATURATION: 98 % | WEIGHT: 181 LBS | HEIGHT: 73 IN

## 2024-01-05 DIAGNOSIS — Z12.11 SCREEN FOR COLON CANCER: ICD-10-CM

## 2024-01-05 DIAGNOSIS — Z12.5 PROSTATE CANCER SCREENING: ICD-10-CM

## 2024-01-05 DIAGNOSIS — N52.01 ERECTILE DYSFUNCTION DUE TO ARTERIAL INSUFFICIENCY: ICD-10-CM

## 2024-01-05 DIAGNOSIS — Z00.00 HEALTH CARE MAINTENANCE: Primary | ICD-10-CM

## 2024-01-05 DIAGNOSIS — I10 ESSENTIAL HYPERTENSION: ICD-10-CM

## 2024-01-05 DIAGNOSIS — Z13.6 SCREENING FOR CARDIOVASCULAR CONDITION: ICD-10-CM

## 2024-01-05 PROBLEM — M25.559 HIP PAIN: Status: RESOLVED | Noted: 2020-01-10 | Resolved: 2024-01-05

## 2024-01-05 PROBLEM — S20.211A CONTUSION OF RIGHT CHEST WALL: Status: RESOLVED | Noted: 2018-02-05 | Resolved: 2024-01-05

## 2024-01-05 PROBLEM — L98.9 SKIN LESION: Status: RESOLVED | Noted: 2019-01-03 | Resolved: 2024-01-05

## 2024-01-05 LAB
ALBUMIN SERPL BCP-MCNC: 4.3 G/DL (ref 3.5–5)
ALP SERPL-CCNC: 123 U/L (ref 34–104)
ALT SERPL W P-5'-P-CCNC: 40 U/L (ref 7–52)
ANION GAP SERPL CALCULATED.3IONS-SCNC: 5 MMOL/L
AST SERPL W P-5'-P-CCNC: 31 U/L (ref 13–39)
BILIRUB SERPL-MCNC: 0.78 MG/DL (ref 0.2–1)
BUN SERPL-MCNC: 26 MG/DL (ref 5–25)
CALCIUM SERPL-MCNC: 9.5 MG/DL (ref 8.4–10.2)
CHLORIDE SERPL-SCNC: 109 MMOL/L (ref 96–108)
CHOLEST SERPL-MCNC: 192 MG/DL
CO2 SERPL-SCNC: 27 MMOL/L (ref 21–32)
CREAT SERPL-MCNC: 1.38 MG/DL (ref 0.6–1.3)
ERYTHROCYTE [DISTWIDTH] IN BLOOD BY AUTOMATED COUNT: 12.4 % (ref 11.6–15.1)
GFR SERPL CREATININE-BSD FRML MDRD: 50 ML/MIN/1.73SQ M
GLUCOSE P FAST SERPL-MCNC: 94 MG/DL (ref 65–99)
HCT VFR BLD AUTO: 42.3 % (ref 36.5–49.3)
HDLC SERPL-MCNC: 44 MG/DL
HGB BLD-MCNC: 14.8 G/DL (ref 12–17)
LDLC SERPL CALC-MCNC: 121 MG/DL (ref 0–100)
MCH RBC QN AUTO: 32.2 PG (ref 26.8–34.3)
MCHC RBC AUTO-ENTMCNC: 35 G/DL (ref 31.4–37.4)
MCV RBC AUTO: 92 FL (ref 82–98)
NONHDLC SERPL-MCNC: 148 MG/DL
PLATELET # BLD AUTO: 195 THOUSANDS/UL (ref 149–390)
PMV BLD AUTO: 10.1 FL (ref 8.9–12.7)
POTASSIUM SERPL-SCNC: 4.7 MMOL/L (ref 3.5–5.3)
PROT SERPL-MCNC: 7.1 G/DL (ref 6.4–8.4)
PSA SERPL-MCNC: 0.19 NG/ML (ref 0–4)
RBC # BLD AUTO: 4.6 MILLION/UL (ref 3.88–5.62)
SODIUM SERPL-SCNC: 141 MMOL/L (ref 135–147)
TRIGL SERPL-MCNC: 133 MG/DL
WBC # BLD AUTO: 5.84 THOUSAND/UL (ref 4.31–10.16)

## 2024-01-05 PROCEDURE — 99213 OFFICE O/P EST LOW 20 MIN: CPT | Performed by: FAMILY MEDICINE

## 2024-01-05 PROCEDURE — 85027 COMPLETE CBC AUTOMATED: CPT

## 2024-01-05 PROCEDURE — 80061 LIPID PANEL: CPT

## 2024-01-05 PROCEDURE — 80053 COMPREHEN METABOLIC PANEL: CPT

## 2024-01-05 PROCEDURE — G0103 PSA SCREENING: HCPCS

## 2024-01-05 PROCEDURE — G0439 PPPS, SUBSEQ VISIT: HCPCS | Performed by: FAMILY MEDICINE

## 2024-01-05 PROCEDURE — 36415 COLL VENOUS BLD VENIPUNCTURE: CPT

## 2024-01-05 RX ORDER — TADALAFIL 20 MG
20 TABLET ORAL DAILY PRN
Qty: 20 TABLET | Refills: 0 | Status: SHIPPED | OUTPATIENT
Start: 2024-01-05

## 2024-01-05 RX ORDER — AMLODIPINE BESYLATE 5 MG/1
5 TABLET ORAL DAILY
Qty: 90 TABLET | Refills: 3 | Status: SHIPPED | OUTPATIENT
Start: 2024-01-05

## 2024-01-05 NOTE — PROGRESS NOTES
HCC coding opportunities       Chart reviewed, no opportunity found: CHART REVIEWED, NO OPPORTUNITY FOUND     GR  did not suggest N18.30     Patients Insurance     Medicare Insurance: [facility] [employer]

## 2024-01-05 NOTE — PROGRESS NOTES
Assessment and Plan:     Problem List Items Addressed This Visit     Essential hypertension     Controlled, continue his amlodipine check his blood pressure in the office in a year.         Relevant Medications    amLODIPine (NORVASC) 5 mg tablet    Other Relevant Orders    CBC    Comprehensive metabolic panel    Erectile dysfunction due to arterial insufficiency     Renew his Cialis         Relevant Medications    Cialis 20 MG tablet   Other Visit Diagnoses     Health care maintenance    -  Primary    Prostate cancer screening        Relevant Orders    PSA, Total Screen    Screening for cardiovascular condition        Relevant Orders    Lipid panel    Screen for colon cancer        Relevant Orders    Cologuard           Preventive health issues were discussed with patient, and age appropriate screening tests were ordered as noted in patient's After Visit Summary.  Personalized health advice and appropriate referrals for health education or preventive services given if needed, as noted in patient's After Visit Summary.     History of Present Illness:     Patient presents for a Medicare Wellness Visit    She comes in today for checkup, states he is doing well.  Since his last visit he states that he has had his left hip replaced.  He states he is doing very well with this.  He would like refills of Cialis as he felt that this worked the best for him.       Patient Care Team:  Johnie Alvarez DO as PCP - General  Fernando Mijares MD as PCP - [facility]CP-[facility] ([facility])  Johnie Alvarez DO as PCP - PCP-[facility] ([facility])  Johnie Alvarez DO     Review of Systems:     Review of Systems   Constitutional:  Negative for chills, fatigue and fever.   HENT:  Negative for congestion, ear pain, hearing loss, postnasal drip, rhinorrhea and sore throat.    Eyes:  Negative for pain and visual disturbance.   Respiratory:  Negative for chest tightness, shortness of breath and wheezing.    Cardiovascular:  Negative for chest pain and leg  swelling.   Gastrointestinal:  Negative for abdominal distention, abdominal pain, constipation, diarrhea and vomiting.   Endocrine: Negative for cold intolerance and heat intolerance.   Genitourinary:  Negative for difficulty urinating, frequency and urgency.   Musculoskeletal:  Negative for arthralgias and gait problem.   Skin:  Negative for color change.   Neurological:  Negative for dizziness, tremors, syncope, numbness and headaches.   Hematological:  Negative for adenopathy.   Psychiatric/Behavioral:  Negative for agitation, confusion and sleep disturbance. The patient is not nervous/anxious.         Problem List:     Patient Active Problem List   Diagnosis   • Essential hypertension   • Impaired fasting glucose   • Traumatic complete tear of right rotator cuff   • Erectile dysfunction due to arterial insufficiency      Past Medical and Surgical History:     Past Medical History:   Diagnosis Date   • Hypertension    • Rotator cuff tear, left    • Rotator cuff tear, right      Past Surgical History:   Procedure Laterality Date   • BASAL CELL CARCINOMA EXCISION     • DENTAL SURGERY     • HIP SURGERY Right 02/13/2019   • JOINT REPLACEMENT  12/12/2022   • MOHS SURGERY  03/26/2019    on nose with skin graft from left ear   • WISDOM TOOTH EXTRACTION     • WRIST SURGERY Right     Tendon and muscle repair      Family History:     Family History   Problem Relation Age of Onset   • Diabetes Mother    • Cancer Other       Social History:     Social History     Socioeconomic History   • Marital status:      Spouse name: None   • Number of children: None   • Years of education: None   • Highest education level: None   Occupational History   • None   Tobacco Use   • Smoking status: Never   • Smokeless tobacco: Never   Vaping Use   • Vaping status: Never Used   Substance and Sexual Activity   • Alcohol use: Yes     Comment: special occasions   • Drug use: No   • Sexual activity: Yes   Other Topics Concern   • None    Social History Narrative    Drinks coffee     Social Determinants of Health     Financial Resource Strain: Low Risk  (1/5/2024)    Overall Financial Resource Strain (CARDIA)    • Difficulty of Paying Living Expenses: Not hard at all   Food Insecurity: Not on file   Transportation Needs: No Transportation Needs (1/5/2024)    PRAPARE - Transportation    • Lack of Transportation (Medical): No    • Lack of Transportation (Non-Medical): No   Physical Activity: Not on file   Stress: Not on file   Social Connections: Not on file   Intimate Partner Violence: Not on file   Housing Stability: Not on file      Medications and Allergies:     Current Outpatient Medications   Medication Sig Dispense Refill   • amLODIPine (NORVASC) 5 mg tablet Take 1 tablet (5 mg total) by mouth daily 90 tablet 3   • Cialis 20 MG tablet Take 1 tablet (20 mg total) by mouth daily as needed for erectile dysfunction 20 tablet 0   • diclofenac (VOLTAREN) 75 mg EC tablet diclofenac sodium 75 mg tablet,delayed release     • Garlic 100 MG TABS Take by mouth     • Multiple Vitamin (multivitamin) tablet Take 1 tablet by mouth daily     • meloxicam (MOBIC) 15 mg tablet meloxicam 15 mg tablet (Patient not taking: No sig reported)     • traMADol (ULTRAM) 50 mg tablet Take 1 tablet (50 mg total) by mouth 2 (two) times a day (Patient not taking: Reported on 1/5/2024) 50 tablet 0     No current facility-administered medications for this visit.     Allergies   Allergen Reactions   • Tetracyclines & Related Anaphylaxis     Sores appeared in mouth and genitals   • Demerol [Meperidine] Vomiting      Immunizations:       There is no immunization history on file for this patient.   Health Maintenance:         Topic Date Due   • Hepatitis C Screening  Never done   • Colorectal Cancer Screening  Never done         Topic Date Due   • COVID-19 Vaccine (1) Never done   • Pneumococcal Vaccine: 65+ Years (1 - PCV) Never done   • Influenza Vaccine (1) Never done       Medicare Screening Tests and Risk Assessments:     Fabian is here for his Subsequent Wellness visit. Last Medicare Wellness visit information reviewed, patient interviewed and updates made to the record today.      Health Risk Assessment:   Patient rates overall health as excellent. Patient feels that their physical health rating is much better. Patient is very satisfied with their life. Eyesight was rated as same. Hearing was rated as same. Patient feels that their emotional and mental health rating is same. Patients states they are never, rarely angry. Patient states they are sometimes unusually tired/fatigued. Pain experienced in the last 7 days has been none. Patient states that he has experienced no weight loss or gain in last 6 months.     Depression Screening:   PHQ-2 Score: 0      Fall Risk Screening:   In the past year, patient has experienced: no history of falling in past year      Home Safety:  Patient does not have trouble with stairs inside or outside of their home. Patient has working smoke alarms and has working carbon monoxide detector. Home safety hazards include: none.     Nutrition:   Current diet is Regular.     Medications:   Patient is currently taking over-the-counter supplements. OTC medications include: see medication list. Patient is able to manage medications.     Activities of Daily Living (ADLs)/Instrumental Activities of Daily Living (IADLs):   Walk and transfer into and out of bed and chair?: Yes  Dress and groom yourself?: Yes    Bathe or shower yourself?: Yes    Feed yourself? Yes  Do your laundry/housekeeping?: Yes  Manage your money, pay your bills and track your expenses?: Yes  Make your own meals?: Yes    Do your own shopping?: Yes    Previous Hospitalizations:   Any hospitalizations or ED visits within the last 12 months?: Yes    How many hospitalizations have you had in the last year?: 1-2    Advance Care Planning:   Living will: No    Durable POA for healthcare: No   "    Cognitive Screening:   Provider or family/friend/caregiver concerned regarding cognition?: No    PREVENTIVE SCREENINGS      Cardiovascular Screening:    General: Screening Current      Diabetes Screening:       Due for: Blood Glucose      Colorectal Cancer Screening:       Due for: Cologuard      Prostate Cancer Screening:      Due for: PSA      Osteoporosis Screening:    General: Screening Not Indicated      Abdominal Aortic Aneurysm (AAA) Screening:    Risk factors include: age between 65-74 yo        Lung Cancer Screening:     General: Screening Not Indicated      Hepatitis C Screening:    General: Screening Not Indicated    Screening, Brief Intervention, and Referral to Treatment (SBIRT)    Screening  Typical number of drinks in a day: 0  Typical number of drinks in a week: 0  Interpretation: Low risk drinking behavior.    Single Item Drug Screening:  How often have you used an illegal drug (including marijuana) or a prescription medication for non-medical reasons in the past year? never    Single Item Drug Screen Score: 0  Interpretation: Negative screen for possible drug use disorder    Brief Intervention  Alcohol & drug use screenings were reviewed. No concerns regarding substance use disorder identified.     Other Counseling Topics:   Car/seat belt/driving safety and sunscreen.     No results found.     Physical Exam:     /80 (BP Location: Left arm, Patient Position: Sitting, Cuff Size: Adult)   Pulse 76   Temp (!) 97.2 °F (36.2 °C) (Tympanic)   Ht 6' 1\" (1.854 m)   Wt 82.1 kg (181 lb)   SpO2 98%   BMI 23.88 kg/m²     Physical Exam  Constitutional:       Appearance: He is well-developed.   HENT:      Head: Normocephalic.      Right Ear: External ear normal.      Left Ear: External ear normal.      Nose: Nose normal.   Eyes:      Extraocular Movements: Extraocular movements intact.      Conjunctiva/sclera: Conjunctivae normal.      Pupils: Pupils are equal, round, and reactive to light. "   Neck:      Thyroid: No thyromegaly.   Cardiovascular:      Rate and Rhythm: Normal rate and regular rhythm.      Heart sounds: Normal heart sounds.   Pulmonary:      Effort: Pulmonary effort is normal.      Breath sounds: Normal breath sounds.   Abdominal:      General: Bowel sounds are normal.      Palpations: Abdomen is soft.   Musculoskeletal:         General: Normal range of motion.      Cervical back: Normal range of motion.   Skin:     General: Skin is warm and dry.   Neurological:      Mental Status: He is alert and oriented to person, place, and time.   Psychiatric:         Mood and Affect: Mood normal.         Behavior: Behavior normal.          Johnie Alvarez DO

## 2024-01-05 NOTE — PATIENT INSTRUCTIONS
Medicare Preventive Visit Patient Instructions  Thank you for completing your Welcome to Medicare Visit or Medicare Annual Wellness Visit today. Your next wellness visit will be due in one year (1/5/2025).  The screening/preventive services that you may require over the next 5-10 years are detailed below. Some tests may not apply to you based off risk factors and/or age. Screening tests ordered at today's visit but not completed yet may show as past due. Also, please note that scanned in results may not display below.  Preventive Screenings:  Service Recommendations Previous Testing/Comments   Colorectal Cancer Screening  Colonoscopy    Fecal Occult Blood Test (FOBT)/Fecal Immunochemical Test (FIT)  Fecal DNA/Cologuard Test  Flexible Sigmoidoscopy Age: 45-75 years old   Colonoscopy: every 10 years (May be performed more frequently if at higher risk)  OR  FOBT/FIT: every 1 year  OR  Cologuard: every 3 years  OR  Sigmoidoscopy: every 5 years  Screening may be recommended earlier than age 45 if at higher risk for colorectal cancer. Also, an individualized decision between you and your healthcare provider will decide whether screening between the ages of 76-85 would be appropriate. Colonoscopy: Not on file  FOBT/FIT: Not on file  Cologuard: Not on file  Sigmoidoscopy: Not on file          Prostate Cancer Screening Individualized decision between patient and health care provider in men between ages of 55-69   Medicare will cover every 12 months beginning on the day after your 50th birthday PSA: 0.2 ng/mL           Hepatitis C Screening Once for adults born between 1945 and 1965  More frequently in patients at high risk for Hepatitis C Hep C Antibody: Not on file        Diabetes Screening 1-2 times per year if you're at risk for diabetes or have pre-diabetes Fasting glucose: 99 mg/dL (2/7/2022)  A1C: 4.9 % (4/30/2019)      Cholesterol Screening Once every 5 years if you don't have a lipid disorder. May order more often  based on risk factors. Lipid panel: 02/07/2022  Screening Current      Other Preventive Screenings Covered by Medicare:  Abdominal Aortic Aneurysm (AAA) Screening: covered once if your at risk. You're considered to be at risk if you have a family history of AAA or a male between the age of 65-75 who smoking at least 100 cigarettes in your lifetime.  Lung Cancer Screening: covers low dose CT scan once per year if you meet all of the following conditions: (1) Age 55-77; (2) No signs or symptoms of lung cancer; (3) Current smoker or have quit smoking within the last 15 years; (4) You have a tobacco smoking history of at least 20 pack years (packs per day x number of years you smoked); (5) You get a written order from a healthcare provider.  Glaucoma Screening: covered annually if you're considered high risk: (1) You have diabetes OR (2) Family history of glaucoma OR (3)  aged 50 and older OR (4)  American aged 65 and older  Osteoporosis Screening: covered every 2 years if you meet one of the following conditions: (1) Have a vertebral abnormality; (2) On glucocorticoid therapy for more than 3 months; (3) Have primary hyperparathyroidism; (4) On osteoporosis medications and need to assess response to drug therapy.  HIV Screening: covered annually if you're between the age of 15-65. Also covered annually if you are younger than 15 and older than 65 with risk factors for HIV infection. For pregnant patients, it is covered up to 3 times per pregnancy.    Immunizations:  Immunization Recommendations   Influenza Vaccine Annual influenza vaccination during flu season is recommended for all persons aged >= 6 months who do not have contraindications   Pneumococcal Vaccine   * Pneumococcal conjugate vaccine = PCV13 (Prevnar 13), PCV15 (Vaxneuvance), PCV20 (Prevnar 20)  * Pneumococcal polysaccharide vaccine = PPSV23 (Pneumovax) Adults 19-65 yo with certain risk factors or if 65+ yo  If never received any  pneumonia vaccine: recommend Prevnar 20 (PCV20)  Give PCV20 if previously received 1 dose of PCV13 or PPSV23   Hepatitis B Vaccine 3 dose series if at intermediate or high risk (ex: diabetes, end stage renal disease, liver disease)   Respiratory syncytial virus (RSV) Vaccine - COVERED BY MEDICARE PART D  * RSVPreF3 (Arexvy) CDC recommends that adults 60 years of age and older may receive a single dose of RSV vaccine using shared clinical decision-making (SCDM)   Tetanus (Td) Vaccine - COST NOT COVERED BY MEDICARE PART B Following completion of primary series, a booster dose should be given every 10 years to maintain immunity against tetanus. Td may also be given as tetanus wound prophylaxis.   Tdap Vaccine - COST NOT COVERED BY MEDICARE PART B Recommended at least once for all adults. For pregnant patients, recommended with each pregnancy.   Shingles Vaccine (Shingrix) - COST NOT COVERED BY MEDICARE PART B  2 shot series recommended in those 19 years and older who have or will have weakened immune systems or those 50 years and older     Health Maintenance Due:      Topic Date Due   • Hepatitis C Screening  Never done   • Colorectal Cancer Screening  Never done     Immunizations Due:      Topic Date Due   • COVID-19 Vaccine (1) Never done   • Pneumococcal Vaccine: 65+ Years (1 - PCV) Never done   • Influenza Vaccine (1) Never done     Advance Directives   What are advance directives?  Advance directives are legal documents that state your wishes and plans for medical care. These plans are made ahead of time in case you lose your ability to make decisions for yourself. Advance directives can apply to any medical decision, such as the treatments you want, and if you want to donate organs.   What are the types of advance directives?  There are many types of advance directives, and each state has rules about how to use them. You may choose a combination of any of the following:  Living will:  This is a written record of  the treatment you want. You can also choose which treatments you do not want, which to limit, and which to stop at a certain time. This includes surgery, medicine, IV fluid, and tube feedings.   Durable power of  for healthcare (DPAHC):  This is a written record that states who you want to make healthcare choices for you when you are unable to make them for yourself. This person, called a proxy, is usually a family member or a friend. You may choose more than 1 proxy.  Do not resuscitate (DNR) order:  A DNR order is used in case your heart stops beating or you stop breathing. It is a request not to have certain forms of treatment, such as CPR. A DNR order may be included in other types of advance directives.  Medical directive:  This covers the care that you want if you are in a coma, near death, or unable to make decisions for yourself. You can list the treatments you want for each condition. Treatment may include pain medicine, surgery, blood transfusions, dialysis, IV or tube feedings, and a ventilator (breathing machine).  Values history:  This document has questions about your views, beliefs, and how you feel and think about life. This information can help others choose the care that you would choose.  Why are advance directives important?  An advance directive helps you control your care. Although spoken wishes may be used, it is better to have your wishes written down. Spoken wishes can be misunderstood, or not followed. Treatments may be given even if you do not want them. An advance directive may make it easier for your family to make difficult choices about your care.       © Copyright [employer] 2018 Information is for End User's use only and may not be sold, redistributed or otherwise used for commercial purposes. All illustrations and images included in CareNotes® are the copyrighted property of [email or web address] or [employer]

## 2024-01-10 ENCOUNTER — TELEPHONE (OUTPATIENT)
Dept: FAMILY MEDICINE CLINIC | Facility: CLINIC | Age: 74
End: 2024-01-10

## 2024-01-10 DIAGNOSIS — N52.01 ERECTILE DYSFUNCTION DUE TO ARTERIAL INSUFFICIENCY: Primary | ICD-10-CM

## 2024-01-10 RX ORDER — TADALAFIL 20 MG/1
20 TABLET ORAL DAILY PRN
Qty: 20 TABLET | Refills: 0 | Status: SHIPPED | OUTPATIENT
Start: 2024-01-10

## 2024-02-09 ENCOUNTER — OFFICE VISIT (OUTPATIENT)
Dept: FAMILY MEDICINE CLINIC | Facility: CLINIC | Age: 74
End: 2024-02-09
Payer: COMMERCIAL

## 2024-02-09 VITALS
BODY MASS INDEX: 24.52 KG/M2 | HEIGHT: 73 IN | TEMPERATURE: 95.8 F | OXYGEN SATURATION: 98 % | SYSTOLIC BLOOD PRESSURE: 136 MMHG | WEIGHT: 185 LBS | DIASTOLIC BLOOD PRESSURE: 80 MMHG | HEART RATE: 81 BPM

## 2024-02-09 DIAGNOSIS — J32.9 RHINOSINUSITIS: Primary | ICD-10-CM

## 2024-02-09 DIAGNOSIS — N18.31 STAGE 3A CHRONIC KIDNEY DISEASE (HCC): ICD-10-CM

## 2024-02-09 PROCEDURE — 99213 OFFICE O/P EST LOW 20 MIN: CPT | Performed by: FAMILY MEDICINE

## 2024-02-09 RX ORDER — METHYLPREDNISOLONE 4 MG/1
TABLET ORAL
Qty: 21 TABLET | Refills: 0 | Status: SHIPPED | OUTPATIENT
Start: 2024-02-09 | End: 2024-02-15

## 2024-02-09 RX ORDER — CEFDINIR 300 MG/1
300 CAPSULE ORAL EVERY 12 HOURS SCHEDULED
Qty: 20 CAPSULE | Refills: 0 | Status: SHIPPED | OUTPATIENT
Start: 2024-02-09 | End: 2024-02-19

## 2024-02-09 NOTE — ASSESSMENT & PLAN NOTE
Lab Results   Component Value Date    EGFR 50 01/05/2024    EGFR 51 02/07/2022    EGFR 67 02/14/2020    CREATININE 1.38 (H) 01/05/2024    CREATININE 1.38 (H) 02/07/2022    CREATININE 1.33 (H) 01/07/2021   Stable, will continue to monitor

## 2024-02-09 NOTE — PROGRESS NOTES
Name: Fabian Diaz      : 1950      MRN: [id number]  Encounter Provider: Johnie Alvarez DO  Encounter Date: 2024   Encounter department: [facility] [address]    Assessment & Plan     1. Rhinosinusitis  -     cefdinir (OMNICEF) 300 mg capsule; Take 1 capsule (300 mg total) by mouth every 12 (twelve) hours for 10 days  -     methylPREDNISolone 4 MG tablet therapy pack; Use as directed on package    2. Stage 3a chronic kidney disease (HCC)  Assessment & Plan:  Lab Results   Component Value Date    EGFR 50 2024    EGFR 51 2022    EGFR 67 2020    CREATININE 1.38 (H) 2024    CREATININE 1.38 (H) 2022    CREATININE 1.33 (H) 2021   Stable, will continue to monitor             Subjective      Patient comes in today with a 2-week history of sinus congestion, pressure, pain that radiates to his ears.  He does complain of some dizziness as well.      Review of Systems   Constitutional:  Positive for chills.   HENT:  Positive for ear pain, rhinorrhea and sinus pain. Negative for sore throat.    Eyes:  Negative for pain.   Respiratory:  Positive for cough.    Cardiovascular:  Negative for chest pain.   Gastrointestinal:  Negative for abdominal pain.   Genitourinary:  Negative for difficulty urinating.   Neurological:  Positive for dizziness.       Current Outpatient Medications on File Prior to Visit   Medication Sig    amLODIPine (NORVASC) 5 mg tablet Take 1 tablet (5 mg total) by mouth daily    diclofenac (VOLTAREN) 75 mg EC tablet diclofenac sodium 75 mg tablet,delayed release    Garlic 100 MG TABS Take by mouth    Multiple Vitamin (multivitamin) tablet Take 1 tablet by mouth daily    tadalafil (CIALIS) 20 MG tablet Take 1 tablet (20 mg total) by mouth daily as needed for erectile dysfunction    [DISCONTINUED] meloxicam (MOBIC) 15 mg tablet meloxicam 15 mg tablet (Patient not taking: No sig reported)    [DISCONTINUED] traMADol (ULTRAM) 50  "mg tablet Take 1 tablet (50 mg total) by mouth 2 (two) times a day (Patient not taking: Reported on 1/5/2024)       Objective     /80 (BP Location: Left arm, Patient Position: Sitting, Cuff Size: Standard)   Pulse 81   Temp (!) 95.8 °F (35.4 °C) (Tympanic)   Ht 6' 1\" (1.854 m)   Wt 83.9 kg (185 lb)   SpO2 98%   BMI 24.41 kg/m²     Physical Exam  Vitals and nursing note reviewed.   Constitutional:       Appearance: He is well-developed.   HENT:      Head: Normocephalic.      Nose: Mucosal edema and rhinorrhea present.      Right Sinus: Maxillary sinus tenderness and frontal sinus tenderness present.      Left Sinus: Maxillary sinus tenderness and frontal sinus tenderness present.      Comments: Bilateral sinus tenderness over the maxillary sinuses  Eyes:      Conjunctiva/sclera: Conjunctivae normal.   Cardiovascular:      Rate and Rhythm: Normal rate and regular rhythm.   Pulmonary:      Effort: No respiratory distress.      Breath sounds: No wheezing.   Abdominal:      Tenderness: There is no abdominal tenderness.   Musculoskeletal:      Cervical back: Neck supple.   Lymphadenopathy:      Cervical: No cervical adenopathy.       Johnie Alvarez DO    "

## 2024-03-08 ENCOUNTER — TELEPHONE (OUTPATIENT)
Dept: FAMILY MEDICINE CLINIC | Facility: CLINIC | Age: 74
End: 2024-03-08

## 2024-03-08 NOTE — TELEPHONE ENCOUNTER
Received VM from pt's spouse     Hi, this is Elizabeth Diaz. I'm calling in regards to my  Fabian Cardenasy . His birth date is [birth year]. He was in not too long ago and had a physical with Doctor Antonio. However, I need to get a message to him in regards to Fabian. He has been having palpitations in the center of his chest and he never told Doctor Antonio. He also gets dizzy now and then and he also snores really bad in the night which keeps me awake. Please, I need to speak with Doctor Alvarez, or please have Doctor Antonio call Fabian and get him to come in and have his heart checked out or whatever he thinks this is. I'm really concerned. It seems like they're coming more frequently, and I know if there's some sort of heart situation that early intervention is definitely warranted, please call me back, [phone], so I know that this message is being addressed. Again, I need to speak with Doctor Alvarez or I'm requesting him to call my , Fabian Diaz, . Thank you. silvio Layton.      Please advise

## 2024-03-11 NOTE — TELEPHONE ENCOUNTER
Called and LVM for pt's spouse -- appt scheduled for 3PM, informed her to call if this time does not work.

## 2024-04-12 ENCOUNTER — TELEPHONE (OUTPATIENT)
Dept: OTHER | Facility: OTHER | Age: 74
End: 2024-04-12

## 2024-04-12 NOTE — TELEPHONE ENCOUNTER
Medication Refill Request     Name Amlodipine   Dose/Frequency 5mg daily  Quantity req 90 day supply  Verified pharmacy   [x]  Verified ordering Provider   [x]  Does patient have enough for the next 3 days? Yes [x] No []       Medication Refill Request     Name Diclofenac  Dose/Frequency 75mg daily  Quantity req 90 day  Verified pharmacy   [x]  Verified ordering Provider   [x]  Does patient have enough for the next 3 days? Yes [x] No []     Req 90 day scripts be sent for refill through [facility] NOT [facility]. Thank you.

## 2024-04-15 DIAGNOSIS — I10 ESSENTIAL HYPERTENSION: ICD-10-CM

## 2024-04-15 DIAGNOSIS — S46.011D TRAUMATIC COMPLETE TEAR OF RIGHT ROTATOR CUFF, SUBSEQUENT ENCOUNTER: Primary | ICD-10-CM

## 2024-04-15 RX ORDER — DICLOFENAC SODIUM 75 MG/1
75 TABLET, DELAYED RELEASE ORAL 2 TIMES DAILY
Qty: 60 TABLET | Refills: 0 | Status: SHIPPED | OUTPATIENT
Start: 2024-04-15

## 2024-04-15 RX ORDER — AMLODIPINE BESYLATE 5 MG/1
5 TABLET ORAL DAILY
Qty: 90 TABLET | Refills: 3 | Status: SHIPPED | OUTPATIENT
Start: 2024-04-15

## 2024-05-03 DIAGNOSIS — I10 ESSENTIAL HYPERTENSION: ICD-10-CM

## 2024-05-03 NOTE — TELEPHONE ENCOUNTER
Patient would like a 90 day supply of the amlodipine sent to [facility]

## 2024-05-04 RX ORDER — AMLODIPINE BESYLATE 5 MG/1
5 TABLET ORAL DAILY
Qty: 90 TABLET | Refills: 3 | Status: SHIPPED | OUTPATIENT
Start: 2024-05-04

## 2024-09-16 ENCOUNTER — TELEPHONE (OUTPATIENT)
Age: 74
End: 2024-09-16

## 2024-09-16 NOTE — TELEPHONE ENCOUNTER
pt has been having some bouts of pain in head, with dizziness with some weakness and SOB this is on and off and doesn't happen for to rj maybe only 10-15 min or sometimes up to 30 mins on and off does take his BP and sometimes it's high with a higher pulse as well -     Pt also wanted this message to be sent to his provider as he is scheduled with Dr Huerta tomorrow but wanted Dr Alvarez to see this message to see if he could be squeezed onto his schedule, please advise if any changes

## 2024-09-16 NOTE — TELEPHONE ENCOUNTER
LMOM - confirmed appt for tomorrow, read  's advisement & to call the office with any questions or concerns.

## 2024-09-17 ENCOUNTER — OFFICE VISIT (OUTPATIENT)
Dept: FAMILY MEDICINE CLINIC | Facility: CLINIC | Age: 74
End: 2024-09-17
Payer: COMMERCIAL

## 2024-09-17 VITALS
BODY MASS INDEX: 24.25 KG/M2 | HEART RATE: 73 BPM | TEMPERATURE: 97.2 F | SYSTOLIC BLOOD PRESSURE: 128 MMHG | OXYGEN SATURATION: 98 % | DIASTOLIC BLOOD PRESSURE: 72 MMHG | WEIGHT: 183 LBS | HEIGHT: 73 IN

## 2024-09-17 DIAGNOSIS — R00.2 PALPITATIONS: Primary | ICD-10-CM

## 2024-09-17 PROCEDURE — G2211 COMPLEX E/M VISIT ADD ON: HCPCS | Performed by: FAMILY MEDICINE

## 2024-09-17 PROCEDURE — 99214 OFFICE O/P EST MOD 30 MIN: CPT | Performed by: FAMILY MEDICINE

## 2024-09-17 NOTE — PROGRESS NOTES
"Ambulatory Visit  Name: Fabian Diaz      : 1950      MRN: [id number]  Encounter Provider: Manda Huerta DO  Encounter Date: 2024   Encounter department: [facility] [address]    Assessment & Plan  Palpitations    Orders:    Ambulatory Referral to Cardiology; Future    TSH, 3rd generation with Free T4 reflex; Future     Seems to be possible paroxysmal afib.     History of Present Illness       Patient presents to the office.     Notes that he has an intermittent feeling on \"chest weakness in the center of his chest\", palpitations and lightheadedness and chest discomfort have intermittently been associated. States that he has checked BP and HR when this is occurring, pulse seems to be elevated up to 150s. BP is elevated usually as well. The only thing that seems to help is time, lasting 5-30 minutes. Feels fatigued after.     States that there seems to be no pattern to this. Last episode was last night.     Has a lot of stress in his life. Has irregular sleep. Skips a lot of meals.       Review of Systems   Neurological:  Positive for dizziness.         Objective     /72   Pulse 73   Temp (!) 97.2 °F (36.2 °C)   Ht 6' 1\" (1.854 m)   Wt 83 kg (183 lb)   SpO2 98%   BMI 24.14 kg/m²     Physical Exam  Vitals reviewed.   Constitutional:       General: He is not in acute distress.     Appearance: Normal appearance.   HENT:      Head: Normocephalic and atraumatic.      Right Ear: External ear normal.      Left Ear: External ear normal.      Nose: Nose normal.      Mouth/Throat:      Mouth: Mucous membranes are moist.   Eyes:      Extraocular Movements: Extraocular movements intact.      Conjunctiva/sclera: Conjunctivae normal.   Cardiovascular:      Rate and Rhythm: Normal rate and regular rhythm.      Heart sounds: Normal heart sounds.   Pulmonary:      Effort: Pulmonary effort is normal.      Breath sounds: Normal breath sounds. No wheezing, rhonchi or " rales.   Abdominal:      General: Bowel sounds are normal. There is no distension.      Palpations: Abdomen is soft.      Tenderness: There is no abdominal tenderness.   Musculoskeletal:      Right lower leg: No edema.      Left lower leg: No edema.   Skin:     General: Skin is warm.      Capillary Refill: Capillary refill takes less than 2 seconds.      Findings: No rash.   Neurological:      Mental Status: He is alert. Mental status is at baseline.           DO Casey Vallejo [facility]  9/17/2024 8:35 AM

## 2024-09-24 ENCOUNTER — HOSPITAL ENCOUNTER (OUTPATIENT)
Dept: NON INVASIVE DIAGNOSTICS | Facility: HOSPITAL | Age: 74
Discharge: HOME | End: 2024-09-24
Attending: FAMILY MEDICINE
Payer: COMMERCIAL

## 2024-09-24 DIAGNOSIS — R00.2 PALPITATIONS: ICD-10-CM

## 2024-09-24 PROCEDURE — 93226 XTRNL ECG REC<48 HR SCAN A/R: CPT

## 2024-09-24 PROCEDURE — 93225 XTRNL ECG REC<48 HRS REC: CPT

## 2024-09-28 PROCEDURE — 93227 XTRNL ECG REC<48 HR R&I: CPT | Performed by: INTERNAL MEDICINE

## 2024-09-30 DIAGNOSIS — R00.2 PALPITATIONS: Primary | ICD-10-CM

## 2024-09-30 DIAGNOSIS — I48.92 ATRIAL FLUTTER, PAROXYSMAL (HCC): ICD-10-CM

## 2024-09-30 RX ORDER — METOPROLOL SUCCINATE 25 MG/1
25 TABLET, EXTENDED RELEASE ORAL DAILY
Qty: 90 TABLET | Refills: 0 | Status: SHIPPED | OUTPATIENT
Start: 2024-09-30 | End: 2024-10-02

## 2024-10-02 ENCOUNTER — OFFICE VISIT (OUTPATIENT)
Dept: FAMILY MEDICINE CLINIC | Facility: CLINIC | Age: 74
End: 2024-10-02
Payer: COMMERCIAL

## 2024-10-02 VITALS
BODY MASS INDEX: 24.65 KG/M2 | DIASTOLIC BLOOD PRESSURE: 82 MMHG | WEIGHT: 186 LBS | TEMPERATURE: 96.5 F | HEART RATE: 65 BPM | HEIGHT: 73 IN | OXYGEN SATURATION: 98 % | SYSTOLIC BLOOD PRESSURE: 130 MMHG

## 2024-10-02 DIAGNOSIS — N52.01 ERECTILE DYSFUNCTION DUE TO ARTERIAL INSUFFICIENCY: ICD-10-CM

## 2024-10-02 DIAGNOSIS — N18.31 STAGE 3A CHRONIC KIDNEY DISEASE (HCC): ICD-10-CM

## 2024-10-02 DIAGNOSIS — I48.3 TYPICAL ATRIAL FLUTTER (HCC): Primary | ICD-10-CM

## 2024-10-02 DIAGNOSIS — R73.01 IMPAIRED FASTING GLUCOSE: ICD-10-CM

## 2024-10-02 DIAGNOSIS — I10 ESSENTIAL HYPERTENSION: ICD-10-CM

## 2024-10-02 PROCEDURE — 99214 OFFICE O/P EST MOD 30 MIN: CPT | Performed by: FAMILY MEDICINE

## 2024-10-02 PROCEDURE — G2211 COMPLEX E/M VISIT ADD ON: HCPCS | Performed by: FAMILY MEDICINE

## 2024-10-02 RX ORDER — METOPROLOL SUCCINATE 25 MG/1
25 TABLET, EXTENDED RELEASE ORAL DAILY
Qty: 90 TABLET | Refills: 1 | Status: SHIPPED | OUTPATIENT
Start: 2024-10-02

## 2024-10-02 RX ORDER — TADALAFIL 20 MG/1
20 TABLET ORAL DAILY PRN
Qty: 30 TABLET | Refills: 0 | Status: SHIPPED | OUTPATIENT
Start: 2024-10-02

## 2024-10-02 NOTE — ASSESSMENT & PLAN NOTE
Lab Results   Component Value Date    EGFR 50 01/05/2024    EGFR 67 12/14/2022    EGFR 57 (L) 12/13/2022    CREATININE 1.38 (H) 01/05/2024    CREATININE 1.16 12/14/2022    CREATININE 1.33 (H) 12/13/2022       Orders:    Comprehensive metabolic panel; Future

## 2024-10-02 NOTE — ASSESSMENT & PLAN NOTE
Start metoprolol, check blood work.  I did discuss with him anticoagulation, which he is hesitant to do.  I recommend he start aspirin 325 mg daily.  He does have an appoint with cardiology in December.  Orders:    CBC; Future    Comprehensive metabolic panel; Future    TSH, 3rd generation; Future    Magnesium; Future    metoprolol succinate (TOPROL-XL) 25 mg 24 hr tablet; Take 1 tablet (25 mg total) by mouth daily

## 2024-10-02 NOTE — PROGRESS NOTES
Ambulatory Visit  Name: Fabian Diaz      : 1950      MRN: [id number]  Encounter Provider: Johnie Alvarez DO  Encounter Date: 10/2/2024   Encounter department: [facility] [address]      Assessment & Plan  Typical atrial flutter (HCC)  Start metoprolol, check blood work.  I did discuss with him anticoagulation, which he is hesitant to do.  I recommend he start aspirin 325 mg daily.  He does have an appoint with cardiology in December.  Orders:    CBC; Future    Comprehensive metabolic panel; Future    TSH, 3rd generation; Future    Magnesium; Future    metoprolol succinate (TOPROL-XL) 25 mg 24 hr tablet; Take 1 tablet (25 mg total) by mouth daily    Essential hypertension  Controlled, continue amlodipine.  Orders:    CBC; Future    Comprehensive metabolic panel; Future    Lipid panel; Future    TSH, 3rd generation; Future    Impaired fasting glucose    Orders:    Comprehensive metabolic panel; Future    Stage 3a chronic kidney disease (HCC)  Lab Results   Component Value Date    EGFR 50 2024    EGFR 67 2022    EGFR 57 (L) 2022    CREATININE 1.38 (H) 2024    CREATININE 1.16 2022    CREATININE 1.33 (H) 2022       Orders:    Comprehensive metabolic panel; Future         History of Present Illness     Patient comes in today for follow-up from his Holter monitor.  It was noted that he had several runs of atrial flutter.  He denies any symptoms during those episodes.  He does not have any chest pain, shortness of breath or dizziness.      Review of Systems   Constitutional:  Negative for chills, fatigue and fever.   HENT:  Negative for congestion, ear pain, hearing loss, postnasal drip, rhinorrhea and sore throat.    Eyes:  Negative for pain and visual disturbance.   Respiratory:  Negative for chest tightness, shortness of breath and wheezing.    Cardiovascular:  Negative for chest pain and leg swelling.   Gastrointestinal:  Negative for  "abdominal distention, abdominal pain, constipation, diarrhea and vomiting.   Endocrine: Negative for cold intolerance and heat intolerance.   Genitourinary:  Negative for difficulty urinating, frequency and urgency.   Musculoskeletal:  Negative for arthralgias and gait problem.   Skin:  Negative for color change.   Neurological:  Negative for dizziness, tremors, syncope, numbness and headaches.   Hematological:  Negative for adenopathy.   Psychiatric/Behavioral:  Negative for agitation, confusion and sleep disturbance. The patient is not nervous/anxious.      Objective     /82   Pulse 65   Temp (!) 96.5 °F (35.8 °C)   Ht 6' 1\" (1.854 m)   Wt 84.4 kg (186 lb)   SpO2 98%   BMI 24.54 kg/m²     Physical Exam  Constitutional:       Appearance: He is well-developed.   HENT:      Head: Normocephalic.      Right Ear: External ear normal.      Left Ear: External ear normal.      Nose: Nose normal.   Eyes:      Extraocular Movements: Extraocular movements intact.      Conjunctiva/sclera: Conjunctivae normal.      Pupils: Pupils are equal, round, and reactive to light.   Neck:      Thyroid: No thyromegaly.   Cardiovascular:      Rate and Rhythm: Normal rate and regular rhythm.      Heart sounds: Normal heart sounds.   Pulmonary:      Effort: Pulmonary effort is normal.      Breath sounds: Normal breath sounds.   Abdominal:      General: Bowel sounds are normal.      Palpations: Abdomen is soft.   Musculoskeletal:         General: Normal range of motion.      Cervical back: Normal range of motion.   Skin:     General: Skin is warm and dry.   Neurological:      Mental Status: He is alert and oriented to person, place, and time.   Psychiatric:         Mood and Affect: Mood normal.         Behavior: Behavior normal.         Johnie Alvarez DO     "

## 2024-10-02 NOTE — ASSESSMENT & PLAN NOTE
Controlled, continue amlodipine.  Orders:    CBC; Future    Comprehensive metabolic panel; Future    Lipid panel; Future    TSH, 3rd generation; Future

## 2024-10-03 ENCOUNTER — HOSPITAL ENCOUNTER (EMERGENCY)
Facility: HOSPITAL | Age: 74
Discharge: HOME | End: 2024-10-03
Attending: EMERGENCY MEDICINE
Payer: COMMERCIAL

## 2024-10-03 VITALS
BODY MASS INDEX: 24.52 KG/M2 | DIASTOLIC BLOOD PRESSURE: 67 MMHG | RESPIRATION RATE: 18 BRPM | HEIGHT: 73 IN | TEMPERATURE: 98 F | HEART RATE: 73 BPM | WEIGHT: 185 LBS | SYSTOLIC BLOOD PRESSURE: 152 MMHG | OXYGEN SATURATION: 100 %

## 2024-10-03 DIAGNOSIS — H61.22 IMPACTED CERUMEN OF LEFT EAR: ICD-10-CM

## 2024-10-03 DIAGNOSIS — H92.02 ACUTE PAIN OF LEFT EAR: Primary | ICD-10-CM

## 2024-10-03 PROCEDURE — 99282 EMERGENCY DEPT VISIT SF MDM: CPT

## 2024-10-03 PROCEDURE — 99284 EMERGENCY DEPT VISIT MOD MDM: CPT | Performed by: EMERGENCY MEDICINE

## 2024-10-03 NOTE — DISCHARGE INSTRUCTIONS
Please follow up PCP.  Recommend Debrox to help with earwax impaction.  Recommend tylenol 650 mg and ibuprofen 600 mg every 6 hours as needed for pain. Please return for severe chest pain, significant shortness of breath, severely worsening symptoms, or any other concerning signs or symptoms. Please refer to the following documents for additional instructions and return precautions.

## 2024-10-04 ENCOUNTER — VBI (OUTPATIENT)
Dept: FAMILY MEDICINE CLINIC | Facility: CLINIC | Age: 74
End: 2024-10-04

## 2024-10-04 ENCOUNTER — APPOINTMENT (OUTPATIENT)
Dept: LAB | Facility: CLINIC | Age: 74
End: 2024-10-04
Payer: COMMERCIAL

## 2024-10-04 ENCOUNTER — OFFICE VISIT (OUTPATIENT)
Dept: FAMILY MEDICINE CLINIC | Facility: CLINIC | Age: 74
End: 2024-10-04
Payer: COMMERCIAL

## 2024-10-04 VITALS
TEMPERATURE: 97.6 F | WEIGHT: 184.2 LBS | SYSTOLIC BLOOD PRESSURE: 118 MMHG | DIASTOLIC BLOOD PRESSURE: 64 MMHG | HEART RATE: 66 BPM | OXYGEN SATURATION: 100 % | HEIGHT: 73 IN | BODY MASS INDEX: 24.41 KG/M2

## 2024-10-04 DIAGNOSIS — R73.01 IMPAIRED FASTING GLUCOSE: ICD-10-CM

## 2024-10-04 DIAGNOSIS — I48.3 TYPICAL ATRIAL FLUTTER (HCC): ICD-10-CM

## 2024-10-04 DIAGNOSIS — N18.31 STAGE 3A CHRONIC KIDNEY DISEASE (HCC): ICD-10-CM

## 2024-10-04 DIAGNOSIS — Z09 HOSPITAL DISCHARGE FOLLOW-UP: ICD-10-CM

## 2024-10-04 DIAGNOSIS — I10 ESSENTIAL HYPERTENSION: ICD-10-CM

## 2024-10-04 DIAGNOSIS — H61.22 IMPACTED CERUMEN OF LEFT EAR: Primary | ICD-10-CM

## 2024-10-04 LAB
ALBUMIN SERPL BCG-MCNC: 4.2 G/DL (ref 3.5–5)
ALP SERPL-CCNC: 118 U/L (ref 34–104)
ALT SERPL W P-5'-P-CCNC: 45 U/L (ref 7–52)
ANION GAP SERPL CALCULATED.3IONS-SCNC: 9 MMOL/L (ref 4–13)
AST SERPL W P-5'-P-CCNC: 28 U/L (ref 13–39)
BILIRUB SERPL-MCNC: 0.61 MG/DL (ref 0.2–1)
BUN SERPL-MCNC: 35 MG/DL (ref 5–25)
CALCIUM SERPL-MCNC: 9 MG/DL (ref 8.4–10.2)
CHLORIDE SERPL-SCNC: 107 MMOL/L (ref 96–108)
CHOLEST SERPL-MCNC: 163 MG/DL
CO2 SERPL-SCNC: 25 MMOL/L (ref 21–32)
CREAT SERPL-MCNC: 1.5 MG/DL (ref 0.6–1.3)
ERYTHROCYTE [DISTWIDTH] IN BLOOD BY AUTOMATED COUNT: 12 % (ref 11.6–15.1)
GFR SERPL CREATININE-BSD FRML MDRD: 45 ML/MIN/1.73SQ M
GLUCOSE P FAST SERPL-MCNC: 87 MG/DL (ref 65–99)
HCT VFR BLD AUTO: 42.9 % (ref 36.5–49.3)
HDLC SERPL-MCNC: 45 MG/DL
HGB BLD-MCNC: 14.2 G/DL (ref 12–17)
LDLC SERPL CALC-MCNC: 104 MG/DL (ref 0–100)
MAGNESIUM SERPL-MCNC: 2.3 MG/DL (ref 1.9–2.7)
MCH RBC QN AUTO: 32.8 PG (ref 26.8–34.3)
MCHC RBC AUTO-ENTMCNC: 33.1 G/DL (ref 31.4–37.4)
MCV RBC AUTO: 99 FL (ref 82–98)
NONHDLC SERPL-MCNC: 118 MG/DL
PLATELET # BLD AUTO: 188 THOUSANDS/UL (ref 149–390)
PMV BLD AUTO: 10.7 FL (ref 8.9–12.7)
POTASSIUM SERPL-SCNC: 4.6 MMOL/L (ref 3.5–5.3)
PROT SERPL-MCNC: 6.6 G/DL (ref 6.4–8.4)
RBC # BLD AUTO: 4.33 MILLION/UL (ref 3.88–5.62)
SODIUM SERPL-SCNC: 141 MMOL/L (ref 135–147)
TRIGL SERPL-MCNC: 69 MG/DL
TSH SERPL DL<=0.05 MIU/L-ACNC: 3.07 UIU/ML (ref 0.45–4.5)
WBC # BLD AUTO: 4.54 THOUSAND/UL (ref 4.31–10.16)

## 2024-10-04 PROCEDURE — 85027 COMPLETE CBC AUTOMATED: CPT

## 2024-10-04 PROCEDURE — 80053 COMPREHEN METABOLIC PANEL: CPT

## 2024-10-04 PROCEDURE — 84443 ASSAY THYROID STIM HORMONE: CPT

## 2024-10-04 PROCEDURE — 99213 OFFICE O/P EST LOW 20 MIN: CPT | Performed by: NURSE PRACTITIONER

## 2024-10-04 PROCEDURE — 69210 REMOVE IMPACTED EAR WAX UNI: CPT | Performed by: NURSE PRACTITIONER

## 2024-10-04 PROCEDURE — 36415 COLL VENOUS BLD VENIPUNCTURE: CPT

## 2024-10-04 PROCEDURE — 80061 LIPID PANEL: CPT

## 2024-10-04 PROCEDURE — 83735 ASSAY OF MAGNESIUM: CPT

## 2024-10-04 RX ORDER — ASPIRIN 325 MG
325 TABLET ORAL DAILY
COMMUNITY

## 2024-10-04 NOTE — ED PROVIDER NOTES
Final diagnoses:   Acute pain of left ear   Impacted cerumen of left ear     ED Disposition       ED Disposition   Discharge    Condition   Stable    Date/Time   Thu Oct 3, 2024  7:51 PM    Comment   Fabian Diaz discharge to home/self care.                   Assessment & Plan       Medical Decision Making  73-year-old male history of hypertension presenting with left ear pain.  Impacted cerumen on exam.  Attempted to remove cerumen with peroxide and irrigation.  Able to remove some cerumen.  Medication recommendations.  Advise follow-up ENT for more definitive management of cerumen impaction. Discussed results and recommendations. Advised follow up PCP and ENT. Medication recommendations. Given instructions and return precautions. Patient/family at bedside acknowledged understanding of all written and verbal instructions and return precautions. Discharged.              Medications - No data to display    ED Risk Strat Scores                                               History of Present Illness       Chief Complaint   Patient presents with    Earache     Pt arrives c/o L ear pain which radiates down neck and into L side of jaw. Pt reports pain started after getting out of bed. Denies HA       Past Medical History:   Diagnosis Date    Hypertension     Rotator cuff tear, left     Rotator cuff tear, right       Past Surgical History:   Procedure Laterality Date    BASAL CELL CARCINOMA EXCISION      DENTAL SURGERY      HIP SURGERY Right 02/13/2019    JOINT REPLACEMENT  12/12/2022    MOHS SURGERY  03/26/2019    on nose with skin graft from left ear    WISDOM TOOTH EXTRACTION      WRIST SURGERY Right     Tendon and muscle repair      Family History   Problem Relation Age of Onset    Diabetes Mother     Cancer Other       Social History     Tobacco Use    Smoking status: Never    Smokeless tobacco: Never   Vaping Use    Vaping status: Never Used   Substance Use Topics    Alcohol use: Yes     Comment: special  occasions    Drug use: No      E-Cigarette/Vaping    E-Cigarette Use Never User       E-Cigarette/Vaping Substances    Nicotine No     THC No     CBD No     Flavoring No     Other No     Unknown No       I have reviewed and agree with the history as documented.     73-year-old male history of hypertension presenting with left ear pain.  Patient reports longstanding history of cerumen impaction presenting with worsened ear discomfort.  Denies any drainage.  Denies any nasal congestion or rhinorrhea.  Denies any chest pain shortness of breath.  Denies any traumatic injury.  Denies any other complaints.  Chart reviewed.    Past Medical History:  No date: Hypertension  No date: Rotator cuff tear, left  No date: Rotator cuff tear, right  Family History: non-contributory  Social History          Review of Systems   Constitutional:  Negative for appetite change, chills, diaphoresis, fever and unexpected weight change.   HENT:  Positive for ear pain. Negative for congestion and rhinorrhea.    Eyes:  Negative for photophobia and visual disturbance.   Respiratory:  Negative for cough, chest tightness and shortness of breath.    Cardiovascular:  Negative for chest pain, palpitations and leg swelling.   Gastrointestinal:  Negative for abdominal distention, abdominal pain, blood in stool, constipation, diarrhea, nausea and vomiting.   Genitourinary:  Negative for dysuria and hematuria.   Musculoskeletal:  Negative for back pain, joint swelling, neck pain and neck stiffness.   Skin:  Negative for color change, pallor, rash and wound.   Neurological:  Negative for dizziness, syncope, weakness, light-headedness and headaches.   Psychiatric/Behavioral:  Negative for agitation.    All other systems reviewed and are negative.          Objective       ED Triage Vitals [10/03/24 1831]   Temperature Pulse Blood Pressure Respirations SpO2 Patient Position - Orthostatic VS   98 °F (36.7 °C) 73 152/67 18 100 % Sitting      Temp Source Heart  Rate Source BP Location FiO2 (%) Pain Score    Temporal Monitor Left arm -- --      Vitals      Date and Time Temp Pulse SpO2 Resp BP Pain Score FACES Pain Rating User   10/03/24 1831 98 °F (36.7 °C) 73 100 % 18 152/67 -- -- RO            Physical Exam  Vitals and nursing note reviewed.   Constitutional:       General: He is not in acute distress.     Appearance: Normal appearance. He is well-developed. He is not ill-appearing, toxic-appearing or diaphoretic.   HENT:      Head: Normocephalic and atraumatic.      Left Ear: There is impacted cerumen.      Nose: Nose normal. No congestion or rhinorrhea.      Mouth/Throat:      Mouth: Mucous membranes are moist.      Pharynx: Oropharynx is clear. No oropharyngeal exudate or posterior oropharyngeal erythema.   Eyes:      General: No scleral icterus.        Right eye: No discharge.         Left eye: No discharge.      Extraocular Movements: Extraocular movements intact.      Conjunctiva/sclera: Conjunctivae normal.      Pupils: Pupils are equal, round, and reactive to light.   Neck:      Vascular: No JVD.      Trachea: No tracheal deviation.      Comments: Supple. Normal range of motion.   Cardiovascular:      Rate and Rhythm: Normal rate and regular rhythm.      Heart sounds: Normal heart sounds. No murmur heard.     No friction rub. No gallop.      Comments: Normal rate and regular rhythm  Pulmonary:      Effort: Pulmonary effort is normal. No respiratory distress.      Breath sounds: Normal breath sounds. No stridor. No wheezing or rales.      Comments: Clear to auscultation bilaterally  Chest:      Chest wall: No tenderness.   Abdominal:      General: Bowel sounds are normal. There is no distension.      Palpations: Abdomen is soft.      Tenderness: There is no abdominal tenderness. There is no right CVA tenderness, left CVA tenderness, guarding or rebound.      Comments: Soft, nontender, nondistended.  Normal bowel sounds throughout   Musculoskeletal:          General: No swelling, tenderness, deformity or signs of injury. Normal range of motion.      Cervical back: Normal range of motion and neck supple. No rigidity. No muscular tenderness.      Right lower leg: No edema.      Left lower leg: No edema.   Lymphadenopathy:      Cervical: No cervical adenopathy.   Skin:     General: Skin is warm and dry.      Coloration: Skin is not pale.      Findings: No erythema or rash.   Neurological:      General: No focal deficit present.      Mental Status: He is alert. Mental status is at baseline.      Sensory: No sensory deficit.      Motor: No weakness or abnormal muscle tone.      Coordination: Coordination normal.      Gait: Gait normal.      Comments: Alert.  Strength and sensation grossly intact.  Ambulatory without difficulty at baseline.    Psychiatric:         Behavior: Behavior normal.         Thought Content: Thought content normal.         Results Reviewed       None            No orders to display       Procedures    ED Medication and Procedure Management   Prior to Admission Medications   Prescriptions Last Dose Informant Patient Reported? Taking?   Multiple Vitamin (multivitamin) tablet   Yes No   Sig: Take 1 tablet by mouth daily   amLODIPine (NORVASC) 5 mg tablet   No No   Sig: Take 1 tablet (5 mg total) by mouth daily   diclofenac (VOLTAREN) 75 mg EC tablet   No No   Sig: Take 1 tablet (75 mg total) by mouth 2 (two) times a day   metoprolol succinate (TOPROL-XL) 25 mg 24 hr tablet   No No   Sig: Take 1 tablet (25 mg total) by mouth daily   tadalafil (CIALIS) 20 MG tablet   No No   Sig: Take 1 tablet (20 mg total) by mouth daily as needed for erectile dysfunction      Facility-Administered Medications: None     Discharge Medication List as of 10/3/2024  8:00 PM        CONTINUE these medications which have NOT CHANGED    Details   amLODIPine (NORVASC) 5 mg tablet Take 1 tablet (5 mg total) by mouth daily, Starting Sat 5/4/2024, Normal      diclofenac (VOLTAREN) 75  mg EC tablet Take 1 tablet (75 mg total) by mouth 2 (two) times a day, Starting Mon 4/15/2024, Normal      metoprolol succinate (TOPROL-XL) 25 mg 24 hr tablet Take 1 tablet (25 mg total) by mouth daily, Starting Wed 10/2/2024, Normal      Multiple Vitamin (multivitamin) tablet Take 1 tablet by mouth daily, Historical Med      tadalafil (CIALIS) 20 MG tablet Take 1 tablet (20 mg total) by mouth daily as needed for erectile dysfunction, Starting Wed 10/2/2024, Print           No discharge procedures on file.  ED SEPSIS DOCUMENTATION   Time reflects when diagnosis was documented in both MDM as applicable and the Disposition within this note       Time User Action Codes Description Comment    10/3/2024  7:51 PM Walt Nassar [H92.02] Acute pain of left ear     10/3/2024  7:51 PM Walt Nassar [H61.22] Impacted cerumen of left ear                  Walt Nassar MD  10/03/24 9370

## 2024-10-04 NOTE — PATIENT INSTRUCTIONS
Patient Education     Ear Wax Impaction ED   General Information   You came to the Emergency Department (ED) for ear wax impaction. Your ear normally makes ear wax to protect the inside of the ear. Impaction is when too much wax builds up in your ear. This can cause pain. You may also have trouble hearing.  What care is needed at home?   Call your regular doctor to let them know you were in the ED. Make a follow-up appointment if you were told to.  Do not use cotton tipped swabs to clean inside your ears. Sticking anything into the ears can push the ear wax in deeper and cause impactions.  You can dip a cotton ball in mineral oil and place it in your outer ear for 10 to 20 minutes once a week. This will help keep your ear wax soft. It may help prevent the ear wax from building up again. Do not push the cotton into the ear canal.  You may want to take medicine like ibuprofen, naproxen, or acetaminophen to help with pain.  When do I need to call the doctor?   Your symptoms are not getting better after 1 to 2 days.  You have a fever of 100.4°F (38°C) or higher, chills, or sweats.  Your ear begins bleeding or draining pus  You have new or worsening pain in the ear, ringing in the ear, or hearing loss  You have new or worsening symptoms.  Last Reviewed Date   2020-08-03  Consumer Information Use and Disclaimer   This generalized information is a limited summary of diagnosis, treatment, and/or medication information. It is not meant to be comprehensive and should be used as a tool to help the user understand and/or assess potential diagnostic and treatment options. It does NOT include all information about conditions, treatments, medications, side effects, or risks that may apply to a specific patient. It is not intended to be medical advice or a substitute for the medical advice, diagnosis, or treatment of a health care provider based on the health care provider's examination and assessment of a patient’s specific and  unique circumstances. Patients must speak with a health care provider for complete information about their health, medical questions, and treatment options, including any risks or benefits regarding use of medications. This information does not endorse any treatments or medications as safe, effective, or approved for treating a specific patient. Up[employer], Inc[employer] and its affiliates disclaim any warranty or liability relating to this information or the use thereof. The use of this information is governed by the Terms of Use, available at https://www.[employer].com/en/know/clinical-effectiveness-terms   Copyright   Copyright © 2024 [employer]ToDate, Inc. and its affiliates and/or licensors. All rights reserved.

## 2024-10-04 NOTE — PROGRESS NOTES
Ambulatory Visit  Name: Fabian Diaz      : 1950      MRN: [id number]  Encounter Provider: KINGSLEY Verduzco  Encounter Date: 10/4/2024   Encounter department: [facility] [address]    Assessment & Plan  Impacted cerumen of left ear  Advised to clear ears out after showering, avoid Q-tip use.  To use Debrox drops when symptoms return as discussed.       Hospital discharge follow-up            History of Present Illness     Patient presents to the office for evaluation of left ear fullness and pain.  Symptoms started yesterday upon awakening.  States pain and fullness worsened overnight.  Went to ED last night, had manual irrigation  Started using Debrox last night before going to bed.  Today pain in left ear is less, but still feels fullness.  Denies fever, chills, sore throat, dizziness, tinnitus.        History obtained from : patient  Review of Systems   Constitutional:  Negative for chills and fever.   HENT:  Positive for ear pain. Negative for congestion, ear discharge, hearing loss, sore throat and trouble swallowing.    Eyes:  Negative for photophobia and visual disturbance.   Respiratory:  Negative for cough and shortness of breath.    Cardiovascular:  Negative for chest pain and palpitations.   Gastrointestinal:  Negative for nausea and vomiting.   Musculoskeletal:  Negative for neck pain.   Skin:  Negative for color change and rash.   Neurological:  Negative for dizziness and headaches.   Hematological:  Negative for adenopathy.   Psychiatric/Behavioral:  Negative for confusion.      Pertinent Medical History         Medical History Reviewed by provider this encounter:  Tobacco  Allergies  Meds  Med Hx  Surg Hx  Fam Hx  Soc Hx      Past Medical History   Past Medical History:   Diagnosis Date    Hypertension     Rotator cuff tear, left     Rotator cuff tear, right      Past Surgical History:   Procedure Laterality Date    BASAL CELL CARCINOMA  EXCISION      DENTAL SURGERY      HIP SURGERY Right 02/13/2019    JOINT REPLACEMENT  12/12/2022    MOHS SURGERY  03/26/2019    on nose with skin graft from left ear    WISDOM TOOTH EXTRACTION      WRIST SURGERY Right     Tendon and muscle repair     Family History   Problem Relation Age of Onset    Diabetes Mother     Cancer Other      Current Outpatient Medications on File Prior to Visit   Medication Sig Dispense Refill    amLODIPine (NORVASC) 5 mg tablet Take 1 tablet (5 mg total) by mouth daily 90 tablet 3    aspirin 325 mg tablet Take 325 mg by mouth daily      diclofenac (VOLTAREN) 75 mg EC tablet Take 1 tablet (75 mg total) by mouth 2 (two) times a day 60 tablet 0    metoprolol succinate (TOPROL-XL) 25 mg 24 hr tablet Take 1 tablet (25 mg total) by mouth daily 90 tablet 1    Multiple Vitamin (multivitamin) tablet Take 1 tablet by mouth daily      tadalafil (CIALIS) 20 MG tablet Take 1 tablet (20 mg total) by mouth daily as needed for erectile dysfunction 30 tablet 0     No current facility-administered medications on file prior to visit.     Allergies   Allergen Reactions    Tetracyclines & Related Anaphylaxis     Sores appeared in mouth and genitals    Demerol [Meperidine] Vomiting      Current Outpatient Medications on File Prior to Visit   Medication Sig Dispense Refill    amLODIPine (NORVASC) 5 mg tablet Take 1 tablet (5 mg total) by mouth daily 90 tablet 3    aspirin 325 mg tablet Take 325 mg by mouth daily      diclofenac (VOLTAREN) 75 mg EC tablet Take 1 tablet (75 mg total) by mouth 2 (two) times a day 60 tablet 0    metoprolol succinate (TOPROL-XL) 25 mg 24 hr tablet Take 1 tablet (25 mg total) by mouth daily 90 tablet 1    Multiple Vitamin (multivitamin) tablet Take 1 tablet by mouth daily      tadalafil (CIALIS) 20 MG tablet Take 1 tablet (20 mg total) by mouth daily as needed for erectile dysfunction 30 tablet 0     No current facility-administered medications on file prior to visit.      Social  "History     Tobacco Use    Smoking status: Never    Smokeless tobacco: Never   Vaping Use    Vaping status: Never Used   Substance and Sexual Activity    Alcohol use: Yes     Comment: special occasions    Drug use: No    Sexual activity: Yes         Objective     /64 (BP Location: Left arm, Patient Position: Sitting)   Pulse 66   Temp 97.6 °F (36.4 °C)   Ht 6' 1\" (1.854 m)   Wt 83.6 kg (184 lb 3.2 oz)   SpO2 100%   BMI 24.30 kg/m²     Physical Exam  Vitals reviewed.   Constitutional:       General: He is not in acute distress.     Appearance: Normal appearance. He is not ill-appearing.   HENT:      Head: Normocephalic and atraumatic.      Right Ear: Tympanic membrane, ear canal and external ear normal.      Left Ear: There is impacted cerumen.      Ears:      Comments: To removal of cerumen from left ear, TM visualized, no erythema or injection noted     Nose: Nose normal.      Mouth/Throat:      Mouth: Mucous membranes are moist.      Pharynx: Oropharynx is clear.   Eyes:      Conjunctiva/sclera: Conjunctivae normal.      Pupils: Pupils are equal, round, and reactive to light.   Cardiovascular:      Rate and Rhythm: Normal rate and regular rhythm.      Pulses: Normal pulses.      Heart sounds: Normal heart sounds. No murmur heard.  Pulmonary:      Effort: Pulmonary effort is normal.      Breath sounds: Normal breath sounds.   Musculoskeletal:         General: Normal range of motion.      Cervical back: Normal range of motion and neck supple.   Lymphadenopathy:      Cervical: No cervical adenopathy.   Skin:     General: Skin is warm and dry.   Neurological:      General: No focal deficit present.      Mental Status: He is alert and oriented to person, place, and time.   Psychiatric:         Mood and Affect: Mood normal.         Behavior: Behavior normal.         Ear cerumen removal    Date/Time: 10/4/2024 2:11 PM    Performed by: KINGSLEY Verduzco  Authorized by: KINGSLEY Verduzco  Universal " Protocol:  procedure performed by consultantConsent: Verbal consent obtained.  Timeout called at: 10/4/2024 2:10 PM.  Patient understanding: patient states understanding of the procedure being performed  Patient identity confirmed: verbally with patient and provided demographic data    Patient location:  Bedside  Procedure details:     Local anesthetic:  None    Location:  L ear    Procedure type: irrigation with instrumentation      Instrumentation: curette      Approach:  External  Post-procedure details:     Complication:  None    Hearing quality:  Improved    Patient tolerance of procedure:  Tolerated well, no immediate complications

## 2024-10-29 ENCOUNTER — VBI (OUTPATIENT)
Dept: ADMINISTRATIVE | Facility: OTHER | Age: 74
End: 2024-10-29

## 2024-10-29 NOTE — TELEPHONE ENCOUNTER
10/29/24 9:20 AM     Chart reviewed for [facility]: Colonoscopy was/were not submitted to the patient's insurance.     Violeta Venegas MA   [facility]

## 2024-11-25 ENCOUNTER — OFFICE VISIT (OUTPATIENT)
Dept: FAMILY MEDICINE CLINIC | Facility: CLINIC | Age: 74
End: 2024-11-25
Payer: COMMERCIAL

## 2024-11-25 VITALS
HEART RATE: 66 BPM | SYSTOLIC BLOOD PRESSURE: 142 MMHG | HEIGHT: 73 IN | DIASTOLIC BLOOD PRESSURE: 84 MMHG | BODY MASS INDEX: 24.78 KG/M2 | WEIGHT: 187 LBS | OXYGEN SATURATION: 97 % | TEMPERATURE: 98.1 F

## 2024-11-25 DIAGNOSIS — R10.13 EPIGASTRIC PAIN: ICD-10-CM

## 2024-11-25 DIAGNOSIS — Z12.11 COLON CANCER SCREENING: Primary | ICD-10-CM

## 2024-11-25 DIAGNOSIS — R10.30 LOWER ABDOMINAL PAIN: ICD-10-CM

## 2024-11-25 PROCEDURE — G2211 COMPLEX E/M VISIT ADD ON: HCPCS | Performed by: FAMILY MEDICINE

## 2024-11-25 PROCEDURE — 99214 OFFICE O/P EST MOD 30 MIN: CPT | Performed by: FAMILY MEDICINE

## 2024-11-25 NOTE — PROGRESS NOTES
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     Recently started magnesium oxide supplement, upon question, patient reports that his symptoms have been worse on days that he has taken this supplement. Patient to d/c magnesium and complete imaging.     History of Present Illness     Patient presents to the office for sick visit.     Reports that he was sick a few weeks ago, URI. States that his symptoms seemed to linger and he developed abdominal cramps from upper abdomen and down. States that sometimes after eating he was feeling like he was having a hard time swallowing. States that this has been going on for a few weeks and has not improved. Notes that he has had some looser stools. Denies diarrhea. Notes that last week he had abdominal cramps in his lower abdomen that lead to diarrhea x 2, reports that this was very gaseous as well. States that he has had a lot of flatulence and some belching. Denies fever or chills. Took Tums last night and this helped with the pain in his upper abdomen last night. Denies blood in the stool. Denies urinary symptoms.     Notes that sometimes he has pain after eating.     He has never had a colonoscopy. Denies weight loss. Denies night sweats. Notes ome abdominal bloating.     Review of Systems   Gastrointestinal:  Positive for abdominal pain.          Objective   /84   Pulse 66   Temp  "98.1 °F (36.7 °C)   Ht 6' 1\" (1.854 m)   Wt 84.8 kg (187 lb)   SpO2 97%   BMI 24.67 kg/m²      Physical Exam  Vitals reviewed.   Constitutional:       General: He is not in acute distress.     Appearance: Normal appearance.   HENT:      Head: Normocephalic and atraumatic.      Right Ear: External ear normal.      Left Ear: External ear normal.      Nose: Nose normal.      Mouth/Throat:      Mouth: Mucous membranes are moist.   Eyes:      Extraocular Movements: Extraocular movements intact.      Conjunctiva/sclera: Conjunctivae normal.      Pupils: Pupils are equal, round, and reactive to light.   Cardiovascular:      Rate and Rhythm: Normal rate and regular rhythm.      Heart sounds: Normal heart sounds.   Pulmonary:      Effort: Pulmonary effort is normal.      Breath sounds: Normal breath sounds. No wheezing, rhonchi or rales.   Abdominal:      General: Bowel sounds are normal. There is no distension.      Palpations: Abdomen is soft.      Tenderness: There is abdominal tenderness in the right upper quadrant, right lower quadrant, epigastric area and left lower quadrant. There is guarding. Negative signs include Garvey's sign and McBurney's sign.   Musculoskeletal:      Cervical back: Neck supple.      Right lower leg: No edema.      Left lower leg: No edema.   Lymphadenopathy:      Cervical: No cervical adenopathy.   Skin:     General: Skin is warm.      Capillary Refill: Capillary refill takes less than 2 seconds.      Findings: No rash.   Neurological:      Mental Status: He is alert. Mental status is at baseline.           Manda Huerta DO  [facility]  11/25/2024 4:02 PM    "

## 2024-11-26 ENCOUNTER — TELEPHONE (OUTPATIENT)
Age: 74
End: 2024-11-26

## 2024-11-26 ENCOUNTER — HOSPITAL ENCOUNTER (OUTPATIENT)
Dept: CT IMAGING | Facility: HOSPITAL | Age: 74
Discharge: HOME | End: 2024-11-26
Attending: FAMILY MEDICINE
Payer: COMMERCIAL

## 2024-11-26 DIAGNOSIS — R10.13 EPIGASTRIC PAIN: ICD-10-CM

## 2024-11-26 DIAGNOSIS — R10.30 LOWER ABDOMINAL PAIN: ICD-10-CM

## 2024-11-26 PROCEDURE — 74177 CT ABD & PELVIS W/CONTRAST: CPT

## 2024-11-26 RX ADMIN — IOHEXOL 100 ML: 350 INJECTION, SOLUTION INTRAVENOUS at 07:30

## 2024-11-26 NOTE — TELEPHONE ENCOUNTER
Fabian called again to see if PCP has reviewed his CT results yet. Advised that Dr. Huerta and Dr. Alvarez are out of the office today, will return tomorrow. Advised someone will be in touch with recommendation once the CT results have been reviewed.

## 2024-11-26 NOTE — TELEPHONE ENCOUNTER
Fabian reports he had STAT CT done this morning, ordered by PCP yesterday. [email or web address]    He requests return call to inform him of PCP recommendation. He asks if antibiotics will be prescribed. He uses [facility].

## 2024-11-27 ENCOUNTER — TELEPHONE (OUTPATIENT)
Age: 74
End: 2024-11-27

## 2024-11-27 ENCOUNTER — RESULTS FOLLOW-UP (OUTPATIENT)
Dept: FAMILY MEDICINE CLINIC | Facility: CLINIC | Age: 74
End: 2024-11-27

## 2024-11-27 DIAGNOSIS — K29.00 ACUTE GASTRITIS, PRESENCE OF BLEEDING UNSPECIFIED, UNSPECIFIED GASTRITIS TYPE: Primary | ICD-10-CM

## 2024-11-27 RX ORDER — PANTOPRAZOLE SODIUM 40 MG/1
40 TABLET, DELAYED RELEASE ORAL DAILY
Qty: 30 TABLET | Refills: 0 | Status: SHIPPED | OUTPATIENT
Start: 2024-11-27

## 2024-11-27 NOTE — TELEPHONE ENCOUNTER
ABX were talked about in terms of if there was diverticulitis. THE CT of the abdomen was normal. He does not need ABX at this point in time. Recommend PPI for  gastritis daily for 30 days.     DO Casey Vallejo [facility]  11/27/2024 2:46 PM

## 2024-11-27 NOTE — TELEPHONE ENCOUNTER
Received call from pt following up regarding a visit with Dr. SIRIA Huerta.  Pt states that he is still having abd pain of a 5 which increases with eating but is still there when not eating and sensitivity.  Pt states that Dr. Huerta has suggesting ordering an abx.  Pt's CT abd is unremarkable.  Pt is asking for an abx to be sent to his preferred [facility] pharmacy in [place].  Please review and advise.    no

## 2024-12-07 ENCOUNTER — VBI (OUTPATIENT)
Dept: ADMINISTRATIVE | Facility: OTHER | Age: 74
End: 2024-12-07

## 2024-12-07 NOTE — TELEPHONE ENCOUNTER
12/07/24 10:48 AM     Chart reviewed for [facility]: Colonoscopy was/were not submitted to the patient's insurance.     Violeta Venegas MA   [facility]

## 2024-12-20 ENCOUNTER — OFFICE VISIT (OUTPATIENT)
Dept: CARDIOLOGY CLINIC | Facility: CLINIC | Age: 74
End: 2024-12-20
Payer: COMMERCIAL

## 2024-12-20 VITALS
BODY MASS INDEX: 24.65 KG/M2 | WEIGHT: 186 LBS | HEIGHT: 73 IN | SYSTOLIC BLOOD PRESSURE: 140 MMHG | RESPIRATION RATE: 16 BRPM | DIASTOLIC BLOOD PRESSURE: 82 MMHG | OXYGEN SATURATION: 98 % | HEART RATE: 61 BPM

## 2024-12-20 DIAGNOSIS — N18.31 STAGE 3A CHRONIC KIDNEY DISEASE (HCC): ICD-10-CM

## 2024-12-20 DIAGNOSIS — R06.02 SHORTNESS OF BREATH: ICD-10-CM

## 2024-12-20 DIAGNOSIS — R00.2 PALPITATIONS: Primary | ICD-10-CM

## 2024-12-20 DIAGNOSIS — I48.92 ATRIAL FLUTTER, UNSPECIFIED TYPE (HCC): ICD-10-CM

## 2024-12-20 PROCEDURE — 93000 ELECTROCARDIOGRAM COMPLETE: CPT | Performed by: INTERNAL MEDICINE

## 2024-12-20 PROCEDURE — 99204 OFFICE O/P NEW MOD 45 MIN: CPT | Performed by: INTERNAL MEDICINE

## 2024-12-20 NOTE — PROGRESS NOTES
Cardiology Consultation     Fabian Diaz  [id number]  1950  [address] [facility]  [facility] [place] [address]    This patient presents for cardiology consultation and evaluation.  Patient was having symptoms of palpitations and shortness of breath and had a Holter monitor which showed paroxysmal atrial flutter.  Patient was started on metoprolol succinate by primary care physician.  Patient does not have any history of coronary artery disease or MI in the past.  Patient does have some shortness of breath with exertion.  Has done overall slightly better on metoprolol succinate.  No recurrence of palpitations.  Patient does have history of hypertension.    Patient does have DJD and has been taking diclofenac.  Patient is noted to have elevated creatinine in the recent past with fluctuating renal functions in the past few years.  Patient denies any bleeding issues.    Patient denies any history of smoking.  No history of alcohol abuse.  Patient works as a  part-time at this time.    No history of presyncope syncope.        1. Palpitations  Ambulatory Referral to Cardiology    POCT ECG      2. Atrial flutter, unspecified type (HCC)  Echo complete w/ contrast if indicated    Comprehensive metabolic panel    apixaban (Eliquis) 5 mg    DISCONTINUED: apixaban (Eliquis) 2.5 mg      3. Shortness of breath  Echo stress test, exercise    CBC and differential      4. Stage 3a chronic kidney disease (HCC)          Patient Active Problem List   Diagnosis    Essential hypertension    Impaired fasting glucose    Traumatic complete tear of right rotator cuff    Erectile dysfunction due to arterial insufficiency    Stage 3a chronic kidney disease (HCC)    Typical atrial flutter (HCC)     Past Medical History:   Diagnosis Date    Hypertension     Rotator cuff tear, left     Rotator cuff tear, right      Social History      Socioeconomic History    Marital status:      Spouse name: Not on file    Number of children: Not on file    Years of education: Not on file    Highest education level: Not on file   Occupational History    Not on file   Tobacco Use    Smoking status: Never    Smokeless tobacco: Never   Vaping Use    Vaping status: Never Used   Substance and Sexual Activity    Alcohol use: Yes     Comment: special occasions    Drug use: No    Sexual activity: Yes   Other Topics Concern    Not on file   Social History Narrative    Drinks coffee     Social Drivers of Health     Financial Resource Strain: Low Risk  (1/5/2024)    Overall Financial Resource Strain (CARDIA)     Difficulty of Paying Living Expenses: Not hard at all   Food Insecurity: Not on file   Transportation Needs: No Transportation Needs (1/5/2024)    PRAPARE - Transportation     Lack of Transportation (Medical): No     Lack of Transportation (Non-Medical): No   Physical Activity: Not on file   Stress: Not on file   Social Connections: Unknown (6/18/2024)    Received from [employer]    Social Datalot     How often do you feel lonely or isolated from those around you? (Adult - for ages 18 years and over): Not on file   Intimate Partner Violence: Not on file   Housing Stability: Not on file      Family History   Problem Relation Age of Onset    Diabetes Mother     Cancer Other      Past Surgical History:   Procedure Laterality Date    BASAL CELL CARCINOMA EXCISION      DENTAL SURGERY      HIP SURGERY Right 02/13/2019    JOINT REPLACEMENT  12/12/2022    MOHS SURGERY  03/26/2019    on nose with skin graft from left ear    WISDOM TOOTH EXTRACTION      WRIST SURGERY Right     Tendon and muscle repair       Current Outpatient Medications:     amLODIPine (NORVASC) 5 mg tablet, Take 1 tablet (5 mg total) by mouth daily, Disp: 90 tablet, Rfl: 3    apixaban (Eliquis) 5 mg, Take 1 tablet (5 mg total) by mouth 2 (two) times a day, Disp: 180 tablet, Rfl: 3     "metoprolol succinate (TOPROL-XL) 25 mg 24 hr tablet, Take 1 tablet (25 mg total) by mouth daily, Disp: 90 tablet, Rfl: 1    Multiple Vitamin (multivitamin) tablet, Take 1 tablet by mouth daily, Disp: , Rfl:     pantoprazole (PROTONIX) 40 mg tablet, Take 1 tablet (40 mg total) by mouth daily, Disp: 30 tablet, Rfl: 0    tadalafil (CIALIS) 20 MG tablet, Take 1 tablet (20 mg total) by mouth daily as needed for erectile dysfunction, Disp: 30 tablet, Rfl: 0  Allergies   Allergen Reactions    Tetracyclines & Related Anaphylaxis     Sores appeared in mouth and genitals    Demerol [Meperidine] Vomiting     Vitals:    12/20/24 1006   BP: 140/82   BP Location: Left arm   Patient Position: Sitting   Cuff Size: Standard   Pulse: 61   Resp: 16   SpO2: 98%   Weight: 84.4 kg (186 lb)   Height: 6' 1\" (1.854 m)       Labs:  No visits with results within 2 Month(s) from this visit.   Latest known visit with results is:   Appointment on 10/04/2024   Component Date Value    WBC 10/04/2024 4.54     RBC 10/04/2024 4.33     Hemoglobin 10/04/2024 14.2     Hematocrit 10/04/2024 42.9     MCV 10/04/2024 99 (H)     MCH 10/04/2024 32.8     MCHC 10/04/2024 33.1     RDW 10/04/2024 12.0     Platelets 10/04/2024 188     MPV 10/04/2024 10.7     Sodium 10/04/2024 141     Potassium 10/04/2024 4.6     Chloride 10/04/2024 107     CO2 10/04/2024 25     ANION GAP 10/04/2024 9     BUN 10/04/2024 35 (H)     Creatinine 10/04/2024 1.50 (H)     Glucose, Fasting 10/04/2024 87     Calcium 10/04/2024 9.0     AST 10/04/2024 28     ALT 10/04/2024 45     Alkaline Phosphatase 10/04/2024 118 (H)     Total Protein 10/04/2024 6.6     Albumin 10/04/2024 4.2     Total Bilirubin 10/04/2024 0.61     eGFR 10/04/2024 45     Cholesterol 10/04/2024 163     Triglycerides 10/04/2024 69     HDL, Direct 10/04/2024 45     LDL Calculated 10/04/2024 104 (H)     Non-HDL-Chol (CHOL-HDL) 10/04/2024 118     TSH 3RD GENERATON 10/04/2024 3.066     Magnesium 10/04/2024 2.3      Imaging: CT " abdomen pelvis w contrast  Result Date: 11/26/2024  Narrative: CT ABDOMEN AND PELVIS WITH IV CONTRAST INDICATION: R10.30: Lower abdominal pain, unspecified R10.13: Epigastric pain. . COMPARISON: None. TECHNIQUE: CT examination of the abdomen and pelvis was performed. Multiplanar 2D reformatted images were created from the source data. This examination, like all CT scans performed in the [facility], was performed utilizing techniques to minimize radiation dose exposure, including the use of iterative reconstruction and automated exposure control. Radiation dose length product (DLP) for this visit: 777 mGy-cm IV Contrast: 100 mL of iohexol (OMNIPAQUE) Enteric Contrast: Administered. FINDINGS: ABDOMEN LOWER CHEST: No clinically significant abnormality in the visualized lower chest. LIVER/BILIARY TREE: Unremarkable. GALLBLADDER: No calcified gallstones. No pericholecystic inflammatory change. SPLEEN: Unremarkable. PANCREAS: Unremarkable. ADRENAL GLANDS: Unremarkable. KIDNEYS/URETERS: Simple renal cyst(s). Otherwise unremarkable kidneys. No hydronephrosis. STOMACH AND BOWEL: Colonic diverticulosis without findings of acute diverticulitis. Long segment mild sigmoid colonic wall thickening, likely related to muscular hypertrophy from chronic diverticular disease. APPENDIX: No findings to suggest appendicitis. ABDOMINOPELVIC CAVITY: No ascites. No pneumoperitoneum. No lymphadenopathy. VESSELS: Atherosclerosis without abdominal aortic aneurysm. PELVIS REPRODUCTIVE ORGANS: Unremarkable for patient's age. URINARY BLADDER: Unremarkable. ABDOMINAL WALL/INGUINAL REGIONS: Unremarkable. BONES: No acute fracture or suspicious osseous lesion. Spinal degenerative changes. Bilateral total hip arthroplasties. Old healed right-sided rib fractures.     Impression: No acute abnormality in the abdomen or pelvis. Workstation performed: [id number]       Review of Systems:  Review of Systems  REVIEW OF SYSTEMS:  Constitutional:   Denies fever or chills   Eyes:  Denies change in visual acuity   HENT:  Denies nasal congestion or sore throat   Respiratory: shortness of breath   Cardiovascular:  Denies chest pain or edema   GI:  Denies abdominal pain, nausea, vomiting, bloody stools or diarrhea   :  Denies dysuria, frequency, difficulty in micturition and nocturia  Musculoskeletal:  Denies back pain or joint pain   Neurologic:  Denies headache, focal weakness or sensory changes   Endocrine:  Denies polyuria or polydipsia   Lymphatic:  Denies swollen glands   Psychiatric:  Denies depression or anxiety    Physical Exam:  Physical Exam  PHYSICAL EXAM:  General:  Patient is not in acute distress   Head: Normocephalic, Atraumatic.  HEENT:  Both pupils normal-size atraumatic, normocephalic, nonicteric  Neck:  JVP not raised. Trachea central. No carotid bruit  Respiratory:  normal breath sounds no crackles. no rhonchi  Cardiovascular:  Regular rate and rhythm no S3 no murmurs  GI:  Abdomen soft nontender. No organomegaly.   Lymphatic:  No cervical or inguinal lymphadenopathy  Neurologic:  Patient is awake alert, oriented . Grossly nonfocal  Extremities no edema    EKG shows sinus rhythm incomplete right bundle branch block and nonspecific ST-T abnormalities    Discussion/Summary:    This patient had symptoms of palpitations and had paroxysmal atrial flutter on Holter monitor.    Lengthy discussion with patient and family regarding etiology and pathogenesis of atrial arrhythmias in general and atrial fibrillation/flutter in particular.    [id number] score 2  Patient understands the risks and benefits of anticoagulation to prevent thromboembolic risk.  Patient is agreeable to proceed with anticoagulation.  Patient will stop aspirin and start Eliquis.    Hopefully his renal function will improve with avoiding NSAIDs and adequate hydration.    Repeat CBC and CMP in the next week.    Echocardiogram will be done to evaluate ejection fraction valves and  rule out any evidence of structural heart disease.    Patient will also be scheduled for a stress echocardiogram to assess for exercise capacity as well as ischemia and to rule out any ischemic substrate.    Symptoms to watch out from cardiac standpoint which would indicate the need for further cardiac evaluation discussed with patient and family.    Information regarding atrial fibrillation/flutter provided to patient and family.  Follow-up in 2 months or earlier as needed.    Patient is agreeable with the plan of care.  Follow-up with PCP.

## 2025-01-07 ENCOUNTER — APPOINTMENT (OUTPATIENT)
Dept: LAB | Facility: CLINIC | Age: 75
End: 2025-01-07
Payer: COMMERCIAL

## 2025-01-07 DIAGNOSIS — I48.92 ATRIAL FLUTTER, UNSPECIFIED TYPE (HCC): ICD-10-CM

## 2025-01-07 DIAGNOSIS — R06.02 SHORTNESS OF BREATH: ICD-10-CM

## 2025-01-07 LAB
ALBUMIN SERPL BCG-MCNC: 4 G/DL (ref 3.5–5)
ALP SERPL-CCNC: 117 U/L (ref 34–104)
ALT SERPL W P-5'-P-CCNC: 39 U/L (ref 7–52)
ANION GAP SERPL CALCULATED.3IONS-SCNC: 8 MMOL/L (ref 4–13)
AST SERPL W P-5'-P-CCNC: 27 U/L (ref 13–39)
BASOPHILS # BLD AUTO: 0.06 THOUSANDS/ΜL (ref 0–0.1)
BASOPHILS NFR BLD AUTO: 1 % (ref 0–1)
BILIRUB SERPL-MCNC: 0.64 MG/DL (ref 0.2–1)
BUN SERPL-MCNC: 23 MG/DL (ref 5–25)
CALCIUM SERPL-MCNC: 8.9 MG/DL (ref 8.4–10.2)
CHLORIDE SERPL-SCNC: 108 MMOL/L (ref 96–108)
CO2 SERPL-SCNC: 26 MMOL/L (ref 21–32)
CREAT SERPL-MCNC: 1.39 MG/DL (ref 0.6–1.3)
EOSINOPHIL # BLD AUTO: 0.23 THOUSAND/ΜL (ref 0–0.61)
EOSINOPHIL NFR BLD AUTO: 4 % (ref 0–6)
ERYTHROCYTE [DISTWIDTH] IN BLOOD BY AUTOMATED COUNT: 13 % (ref 11.6–15.1)
GFR SERPL CREATININE-BSD FRML MDRD: 49 ML/MIN/1.73SQ M
GLUCOSE SERPL-MCNC: 90 MG/DL (ref 65–140)
HCT VFR BLD AUTO: 41.4 % (ref 36.5–49.3)
HGB BLD-MCNC: 13.7 G/DL (ref 12–17)
IMM GRANULOCYTES # BLD AUTO: 0.02 THOUSAND/UL (ref 0–0.2)
IMM GRANULOCYTES NFR BLD AUTO: 0 % (ref 0–2)
LYMPHOCYTES # BLD AUTO: 1.55 THOUSANDS/ΜL (ref 0.6–4.47)
LYMPHOCYTES NFR BLD AUTO: 26 % (ref 14–44)
MCH RBC QN AUTO: 32.5 PG (ref 26.8–34.3)
MCHC RBC AUTO-ENTMCNC: 33.1 G/DL (ref 31.4–37.4)
MCV RBC AUTO: 98 FL (ref 82–98)
MONOCYTES # BLD AUTO: 0.74 THOUSAND/ΜL (ref 0.17–1.22)
MONOCYTES NFR BLD AUTO: 12 % (ref 4–12)
NEUTROPHILS # BLD AUTO: 3.35 THOUSANDS/ΜL (ref 1.85–7.62)
NEUTS SEG NFR BLD AUTO: 57 % (ref 43–75)
NRBC BLD AUTO-RTO: 0 /100 WBCS
PLATELET # BLD AUTO: 201 THOUSANDS/UL (ref 149–390)
PMV BLD AUTO: 10.3 FL (ref 8.9–12.7)
POTASSIUM SERPL-SCNC: 4.2 MMOL/L (ref 3.5–5.3)
PROT SERPL-MCNC: 6.6 G/DL (ref 6.4–8.4)
RBC # BLD AUTO: 4.22 MILLION/UL (ref 3.88–5.62)
SODIUM SERPL-SCNC: 142 MMOL/L (ref 135–147)
WBC # BLD AUTO: 5.95 THOUSAND/UL (ref 4.31–10.16)

## 2025-01-07 PROCEDURE — 36415 COLL VENOUS BLD VENIPUNCTURE: CPT

## 2025-01-07 PROCEDURE — 80053 COMPREHEN METABOLIC PANEL: CPT

## 2025-01-07 PROCEDURE — 85025 COMPLETE CBC W/AUTO DIFF WBC: CPT

## 2025-01-08 ENCOUNTER — RESULTS FOLLOW-UP (OUTPATIENT)
Dept: CARDIOLOGY CLINIC | Facility: CLINIC | Age: 75
End: 2025-01-08

## 2025-01-13 ENCOUNTER — OFFICE VISIT (OUTPATIENT)
Dept: FAMILY MEDICINE CLINIC | Facility: CLINIC | Age: 75
End: 2025-01-13
Payer: COMMERCIAL

## 2025-01-13 VITALS
SYSTOLIC BLOOD PRESSURE: 140 MMHG | HEART RATE: 72 BPM | DIASTOLIC BLOOD PRESSURE: 80 MMHG | OXYGEN SATURATION: 97 % | BODY MASS INDEX: 24.39 KG/M2 | HEIGHT: 73 IN | RESPIRATION RATE: 14 BRPM | WEIGHT: 184 LBS | TEMPERATURE: 97 F

## 2025-01-13 DIAGNOSIS — N18.31 STAGE 3A CHRONIC KIDNEY DISEASE (HCC): ICD-10-CM

## 2025-01-13 DIAGNOSIS — Z00.00 MEDICARE ANNUAL WELLNESS VISIT, SUBSEQUENT: Primary | ICD-10-CM

## 2025-01-13 DIAGNOSIS — I10 ESSENTIAL HYPERTENSION: ICD-10-CM

## 2025-01-13 DIAGNOSIS — R73.01 IMPAIRED FASTING GLUCOSE: ICD-10-CM

## 2025-01-13 DIAGNOSIS — I48.3 TYPICAL ATRIAL FLUTTER (HCC): ICD-10-CM

## 2025-01-13 DIAGNOSIS — Z12.5 PROSTATE CANCER SCREENING: ICD-10-CM

## 2025-01-13 DIAGNOSIS — Z12.11 SCREEN FOR COLON CANCER: ICD-10-CM

## 2025-01-13 PROCEDURE — 99214 OFFICE O/P EST MOD 30 MIN: CPT | Performed by: FAMILY MEDICINE

## 2025-01-13 PROCEDURE — G0439 PPPS, SUBSEQ VISIT: HCPCS | Performed by: FAMILY MEDICINE

## 2025-01-13 RX ORDER — TRAMADOL HYDROCHLORIDE 50 MG/1
TABLET ORAL
COMMUNITY
Start: 2025-01-06

## 2025-01-13 NOTE — PATIENT INSTRUCTIONS
Medicare Preventive Visit Patient Instructions  Thank you for completing your Welcome to Medicare Visit or Medicare Annual Wellness Visit today. Your next wellness visit will be due in one year (1/14/2026).  The screening/preventive services that you may require over the next 5-10 years are detailed below. Some tests may not apply to you based off risk factors and/or age. Screening tests ordered at today's visit but not completed yet may show as past due. Also, please note that scanned in results may not display below.  Preventive Screenings:  Service Recommendations Previous Testing/Comments   Colorectal Cancer Screening  Colonoscopy    Fecal Occult Blood Test (FOBT)/Fecal Immunochemical Test (FIT)  Fecal DNA/Cologuard Test  Flexible Sigmoidoscopy Age: 45-75 years old   Colonoscopy: every 10 years (May be performed more frequently if at higher risk)  OR  FOBT/FIT: every 1 year  OR  Cologuard: every 3 years  OR  Sigmoidoscopy: every 5 years  Screening may be recommended earlier than age 45 if at higher risk for colorectal cancer. Also, an individualized decision between you and your healthcare provider will decide whether screening between the ages of 76-85 would be appropriate. Colonoscopy: Not on file  FOBT/FIT: Not on file  Cologuard: Not on file  Sigmoidoscopy: Not on file          Prostate Cancer Screening Individualized decision between patient and health care provider in men between ages of 55-69   Medicare will cover every 12 months beginning on the day after your 50th birthday PSA: 0.19 ng/mL           Hepatitis C Screening Once for adults born between 1945 and 1965  More frequently in patients at high risk for Hepatitis C Hep C Antibody: Not on file        Diabetes Screening 1-2 times per year if you're at risk for diabetes or have pre-diabetes Fasting glucose: 87 mg/dL (10/4/2024)  A1C: No results in last 5 years (No results in last 5 years)  Screening Current   Cholesterol Screening Once every 5 years if  you don't have a lipid disorder. May order more often based on risk factors. Lipid panel: 10/04/2024  Screening Current      Other Preventive Screenings Covered by Medicare:  Abdominal Aortic Aneurysm (AAA) Screening: covered once if your at risk. You're considered to be at risk if you have a family history of AAA or a male between the age of 65-75 who smoking at least 100 cigarettes in your lifetime.  Lung Cancer Screening: covers low dose CT scan once per year if you meet all of the following conditions: (1) Age 55-77; (2) No signs or symptoms of lung cancer; (3) Current smoker or have quit smoking within the last 15 years; (4) You have a tobacco smoking history of at least 20 pack years (packs per day x number of years you smoked); (5) You get a written order from a healthcare provider.  Glaucoma Screening: covered annually if you're considered high risk: (1) You have diabetes OR (2) Family history of glaucoma OR (3)  aged 50 and older OR (4)  American aged 65 and older  Osteoporosis Screening: covered every 2 years if you meet one of the following conditions: (1) Have a vertebral abnormality; (2) On glucocorticoid therapy for more than 3 months; (3) Have primary hyperparathyroidism; (4) On osteoporosis medications and need to assess response to drug therapy.  HIV Screening: covered annually if you're between the age of 15-65. Also covered annually if you are younger than 15 and older than 65 with risk factors for HIV infection. For pregnant patients, it is covered up to 3 times per pregnancy.    Immunizations:  Immunization Recommendations   Influenza Vaccine Annual influenza vaccination during flu season is recommended for all persons aged >= 6 months who do not have contraindications   Pneumococcal Vaccine   * Pneumococcal conjugate vaccine = PCV13 (Prevnar 13), PCV15 (Vaxneuvance), PCV20 (Prevnar 20)  * Pneumococcal polysaccharide vaccine = PPSV23 (Pneumovax) Adults 19-63 yo with  certain risk factors or if 65+ yo  If never received any pneumonia vaccine: recommend Prevnar 20 (PCV20)  Give PCV20 if previously received 1 dose of PCV13 or PPSV23   Hepatitis B Vaccine 3 dose series if at intermediate or high risk (ex: diabetes, end stage renal disease, liver disease)   Respiratory syncytial virus (RSV) Vaccine - COVERED BY MEDICARE PART D  * RSVPreF3 (Arexvy) CDC recommends that adults 60 years of age and older may receive a single dose of RSV vaccine using shared clinical decision-making (SCDM)   Tetanus (Td) Vaccine - COST NOT COVERED BY MEDICARE PART B Following completion of primary series, a booster dose should be given every 10 years to maintain immunity against tetanus. Td may also be given as tetanus wound prophylaxis.   Tdap Vaccine - COST NOT COVERED BY MEDICARE PART B Recommended at least once for all adults. For pregnant patients, recommended with each pregnancy.   Shingles Vaccine (Shingrix) - COST NOT COVERED BY MEDICARE PART B  2 shot series recommended in those 19 years and older who have or will have weakened immune systems or those 50 years and older     Health Maintenance Due:      Topic Date Due   • Hepatitis C Screening  Never done   • Colorectal Cancer Screening  Never done     Immunizations Due:      Topic Date Due   • Pneumococcal Vaccine: 65+ Years (1 of 1 - PCV) Never done   • Influenza Vaccine (1) Never done   • COVID-19 Vaccine (1 - 2024-25 season) Never done     Advance Directives   What are advance directives?  Advance directives are legal documents that state your wishes and plans for medical care. These plans are made ahead of time in case you lose your ability to make decisions for yourself. Advance directives can apply to any medical decision, such as the treatments you want, and if you want to donate organs.   What are the types of advance directives?  There are many types of advance directives, and each state has rules about how to use them. You may choose a  combination of any of the following:  Living will:  This is a written record of the treatment you want. You can also choose which treatments you do not want, which to limit, and which to stop at a certain time. This includes surgery, medicine, IV fluid, and tube feedings.   Durable power of  for healthcare (DPAHC):  This is a written record that states who you want to make healthcare choices for you when you are unable to make them for yourself. This person, called a proxy, is usually a family member or a friend. You may choose more than 1 proxy.  Do not resuscitate (DNR) order:  A DNR order is used in case your heart stops beating or you stop breathing. It is a request not to have certain forms of treatment, such as CPR. A DNR order may be included in other types of advance directives.  Medical directive:  This covers the care that you want if you are in a coma, near death, or unable to make decisions for yourself. You can list the treatments you want for each condition. Treatment may include pain medicine, surgery, blood transfusions, dialysis, IV or tube feedings, and a ventilator (breathing machine).  Values history:  This document has questions about your views, beliefs, and how you feel and think about life. This information can help others choose the care that you would choose.  Why are advance directives important?  An advance directive helps you control your care. Although spoken wishes may be used, it is better to have your wishes written down. Spoken wishes can be misunderstood, or not followed. Treatments may be given even if you do not want them. An advance directive may make it easier for your family to make difficult choices about your care.   Fall Prevention    Fall prevention  includes ways to make your home and other areas safer. It also includes ways you can move more carefully to prevent a fall. Health conditions that cause changes in your blood pressure, vision, or muscle strength and  coordination may increase your risk for falls. Medicines may also increase your risk for falls if they make you dizzy, weak, or sleepy.   Fall prevention tips:   Stand or sit up slowly.    Use assistive devices as directed.    Wear shoes that fit well and have soles that .    Wear a personal alarm.    Stay active.    Manage your medical conditions.    Home Safety Tips:  Add items to prevent falls in the bathroom.    Keep paths clear.    Install bright lights in your home.    Keep items you use often on shelves within reach.    Paint or place reflective tape on the edges of your stairs.    Narcotic (Opioid) Safety    Use narcotics safely:  Take prescribed narcotics exactly as directed  Do not give narcotics to others or take narcotics that belong to someone else  Do not mix narcotics without medicines or alcohol  Do not drive or operate heavy machinery after you take the narcotic  Monitor for side effects and notify your healthcare provider if you experienced side effects such as nausea, sleepiness, itching, or trouble thinking clearly.    Manage constipation:    Constipation is the most common side effect of narcotic medicine. Constipation is when you have hard, dry bowel movements, or you go longer than usual between bowel movements. Tell your healthcare provider about all changes in your bowel movements while you are taking narcotics. He or she may recommend laxative medicine to help you have a bowel movement. He or she may also change the kind of narcotic you are taking, or change when you take it. The following are more ways you can prevent or relieve constipation:    Drink liquids as directed.  You may need to drink extra liquids to help soften and move your bowels. Ask how much liquid to drink each day and which liquids are best for you.  Eat high-fiber foods.  This may help decrease constipation by adding bulk to your bowel movements. High-fiber foods include fruits, vegetables, whole-grain breads and  cereals, and beans. Your healthcare provider or dietitian can help you create a high-fiber meal plan. Your provider may also recommend a fiber supplement if you cannot get enough fiber from food.  Exercise regularly.  Regular physical activity can help stimulate your intestines. Walking is a good exercise to prevent or relieve constipation. Ask which exercises are best for you.  Schedule a time each day to have a bowel movement.  This may help train your body to have regular bowel movements. Bend forward while you are on the toilet to help move the bowel movement out. Sit on the toilet for at least 10 minutes, even if you do not have a bowel movement.    Store narcotics safely:   Store narcotics where others cannot easily get them.  Keep them in a locked cabinet or secure area. Do not  keep them in a purse or other bag you carry with you. A person may be looking for something else and find the narcotics.  Make sure narcotics are stored out of the reach of children.  A child can easily overdose on narcotics. Narcotics may look like candy to a small child.    The best way to dispose of narcotics:      The laws vary by country and area. In the [place], the best way is to return the narcotics through a take-back program. This program is offered by the [place] Drug Enforcement Agency (LIOR). The following are options for using the program:  Take the narcotics to a LIOR collection site.  The site is often a law enforcement center. Call your local law enforcement center for scheduled take-back days in your area. You will be given information on where to go if the collection site is in a different location.  Take the narcotics to an approved pharmacy or hospital.  A pharmacy or hospital may be set up as a collection site. You will need to ask if it is a LIOR collection site if you were not directed there. A pharmacy or doctor's office may not be able to take back narcotics unless it is a LIOR site.  Use a mail-back system.   This means you are given containers to put the narcotics into. You will then mail them in the containers.  Use a take-back drop box.  This is a place to leave the narcotics at any time. People and animals will not be able to get into the box. Your local law enforcement agency can tell you where to find a drop box in your area.    Other ways to manage pain:   Ask your healthcare provider about non-narcotic medicines to control pain.  Nonprescription medicines include NSAIDs (such as ibuprofen) and acetaminophen. Prescription medicines include muscle relaxers, antidepressants, and steroids.  Pain may be managed without any medicines.  Some ways to relieve pain include massage, aromatherapy, or meditation. Physical or occupational therapy may also help.    For more information:   Drug Enforcement Administration  [address] [place] [address]  Phone: 9- 294 - 987-8539  Web Address: https://www.deadiversion.[facility].gov/drug_disposal/    [place] Food and Drug Administration  [address] [place] [address]  Phone: 5- 051 - 968-8100  Web Address: http://www.fda.gov     © Copyright [employer] 2018 Information is for End User's use only and may not be sold, redistributed or otherwise used for commercial purposes. All illustrations and images included in CareNotes® are the copyrighted property of [employer].A[employer]., Inc. or [employer]

## 2025-01-13 NOTE — PROGRESS NOTES
Name: Fabian Diaz      : 1950      MRN: [id number]  Encounter Provider: Johnie Alvarez DO  Encounter Date: 2025   Encounter department: [facility] [address]    Assessment & Plan  Medicare annual wellness visit, subsequent         Typical atrial flutter (HCC)  Rate controlled, continue the metoprolol and the Eliquis.       Essential hypertension  Well-controlled, continue amlodipine, metoprolol       Impaired fasting glucose  Improved, continue to watch his diet.       Stage 3a chronic kidney disease (HCC)  Lab Results   Component Value Date    EGFR 49 2025    EGFR 45 10/04/2024    EGFR 50 2024    CREATININE 1.39 (H) 2025    CREATININE 1.50 (H) 10/04/2024    CREATININE 1.38 (H) 2024     Orders:  •  Comprehensive metabolic panel; Future  Stable, continue monitor with his routine blood work.  Prostate cancer screening    Orders:  •  PSA, Total Screen; Future    Screen for colon cancer    Orders:  •  Cologuard       Preventive health issues were discussed with patient, and age appropriate screening tests were ordered as noted in patient's After Visit Summary. Personalized health advice and appropriate referrals for health education or preventive services given if needed, as noted in patient's After Visit Summary.    History of Present Illness     Patient comes in today for checkup, he is now seeing a cardiologist who is managing his atrial flutter.  He has started Eliquis.       Patient Care Team:  Johnie Alvarez DO as PCP - General  Fernando Mijares MD as PCP - PCP-[facility] ([facility])  Johnie Alvarez DO as PCP - PCP-[facility] ([facility]  Johnie Alvarez DO    Review of Systems   Constitutional:  Negative for chills, fatigue and fever.   HENT:  Negative for congestion, ear pain, hearing loss, postnasal drip, rhinorrhea and sore throat.    Eyes:  Negative for pain and visual disturbance.   Respiratory:  Negative for chest tightness, shortness of  breath and wheezing.    Cardiovascular:  Negative for chest pain and leg swelling.   Gastrointestinal:  Negative for abdominal distention, abdominal pain, constipation, diarrhea and vomiting.   Endocrine: Negative for cold intolerance and heat intolerance.   Genitourinary:  Negative for difficulty urinating, frequency and urgency.   Musculoskeletal:  Negative for arthralgias and gait problem.   Skin:  Negative for color change.   Neurological:  Negative for dizziness, tremors, syncope, numbness and headaches.   Hematological:  Negative for adenopathy.   Psychiatric/Behavioral:  Negative for agitation, confusion and sleep disturbance. The patient is not nervous/anxious.      Medical History Reviewed by provider this encounter:  Tobacco  Allergies  Meds  Problems  Med Hx  Surg Hx  Fam Hx       Annual Wellness Visit Questionnaire   Fabian is here for his Subsequent Wellness visit. Last Medicare Wellness visit information reviewed, patient interviewed and updates made to the record today.      Health Risk Assessment:   Patient rates overall health as very good. Patient feels that their physical health rating is slightly worse. Patient is very satisfied with their life. Eyesight was rated as same. Hearing was rated as same. Patient feels that their emotional and mental health rating is slightly better. Patients states they are never, rarely angry. Patient states they are sometimes unusually tired/fatigued. Pain experienced in the last 7 days has been some. Patient's pain rating has been 2/10. Patient states that he has experienced no weight loss or gain in last 6 months.     Depression Screening:   PHQ-2 Score: 0      Fall Risk Screening:   In the past year, patient has experienced: history of falling in past year    Number of falls: 1  Injured during fall?: Yes    Feels unsteady when standing or walking?: No    Worried about falling?: No      Home Safety:  Patient does not have trouble with stairs inside or  outside of their home. Patient has working smoke alarms and has working carbon monoxide detector. Home safety hazards include: none.     Medications:   Patient is currently taking over-the-counter supplements. OTC medications include: see medication list. Patient is able to manage medications.     Activities of Daily Living (ADLs)/Instrumental Activities of Daily Living (IADLs):   Walk and transfer into and out of bed and chair?: Yes  Dress and groom yourself?: Yes    Bathe or shower yourself?: Yes    Feed yourself? Yes  Do your laundry/housekeeping?: Yes  Manage your money, pay your bills and track your expenses?: Yes  Make your own meals?: Yes    Do your own shopping?: Yes    Previous Hospitalizations:   Any hospitalizations or ED visits within the last 12 months?: Yes    How many hospitalizations have you had in the last year?: 1-2    Advance Care Planning:   Living will: Yes    Durable POA for healthcare: Yes    Advanced directive: Yes      Cognitive Screening:   Provider or family/friend/caregiver concerned regarding cognition?: No    PREVENTIVE SCREENINGS      Cardiovascular Screening:    General: Screening Current      Diabetes Screening:     General: Screening Current      Colorectal Cancer Screening:     General: Risks and Benefits Discussed    Due for: Cologuard      Prostate Cancer Screening:      Due for: PSA      Osteoporosis Screening:    General: Screening Not Indicated      Abdominal Aortic Aneurysm (AAA) Screening:    Risk factors include: age between 65-74 yo        Lung Cancer Screening:     General: Screening Not Indicated      Hepatitis C Screening:    General: Screening Not Indicated    Review of Current Opioid Use    Opioid Risk Tool (ORT) Interpretation: Complete Opioid Risk Tool (ORT)    Social Drivers of Health     Financial Resource Strain: Low Risk  (1/5/2024)    Overall Financial Resource Strain (CARDIA)    • Difficulty of Paying Living Expenses: Not hard at all   Food Insecurity: No  "Food Insecurity (1/13/2025)    Hunger Vital Sign    • Worried About Running Out of Food in the Last Year: Never true    • Ran Out of Food in the Last Year: Never true   Transportation Needs: No Transportation Needs (1/13/2025)    PRAPARE - Transportation    • Lack of Transportation (Medical): No    • Lack of Transportation (Non-Medical): No   Housing Stability: Unknown (1/13/2025)    Housing Stability Vital Sign    • Unable to Pay for Housing in the Last Year: No    • Homeless in the Last Year: No   Utilities: Not At Risk (1/13/2025)    [facility] [employer]    • Threatened with loss of utilities: No     No results found.    Objective   /80 (BP Location: Left arm)   Pulse 72   Temp (!) 97 °F (36.1 °C)   Resp 14   Ht 6' 1\" (1.854 m)   Wt 83.5 kg (184 lb)   SpO2 97%   BMI 24.28 kg/m²     Physical Exam  Constitutional:       Appearance: He is well-developed.   HENT:      Head: Normocephalic.      Right Ear: External ear normal.      Left Ear: External ear normal.      Nose: Nose normal.   Eyes:      Extraocular Movements: Extraocular movements intact.      Conjunctiva/sclera: Conjunctivae normal.      Pupils: Pupils are equal, round, and reactive to light.   Neck:      Thyroid: No thyromegaly.   Cardiovascular:      Rate and Rhythm: Normal rate. Rhythm irregular.      Heart sounds: Normal heart sounds.   Pulmonary:      Effort: Pulmonary effort is normal.      Breath sounds: Normal breath sounds.   Abdominal:      General: Bowel sounds are normal.      Palpations: Abdomen is soft.   Musculoskeletal:         General: Normal range of motion.      Cervical back: Normal range of motion.   Skin:     General: Skin is warm and dry.   Neurological:      Mental Status: He is alert and oriented to person, place, and time.   Psychiatric:         Mood and Affect: Mood normal.         Behavior: Behavior normal.         "

## 2025-01-13 NOTE — ASSESSMENT & PLAN NOTE
Lab Results   Component Value Date    EGFR 49 01/07/2025    EGFR 45 10/04/2024    EGFR 50 01/05/2024    CREATININE 1.39 (H) 01/07/2025    CREATININE 1.50 (H) 10/04/2024    CREATININE 1.38 (H) 01/05/2024     Orders:  •  Comprehensive metabolic panel; Future  Stable, continue monitor with his routine blood work.

## 2025-01-27 ENCOUNTER — APPOINTMENT (OUTPATIENT)
Dept: LAB | Facility: CLINIC | Age: 75
End: 2025-01-27
Payer: COMMERCIAL

## 2025-01-27 DIAGNOSIS — N18.31 STAGE 3A CHRONIC KIDNEY DISEASE (HCC): ICD-10-CM

## 2025-01-27 DIAGNOSIS — Z12.5 PROSTATE CANCER SCREENING: ICD-10-CM

## 2025-01-27 LAB
ALBUMIN SERPL BCG-MCNC: 4.1 G/DL (ref 3.5–5)
ALP SERPL-CCNC: 102 U/L (ref 34–104)
ALT SERPL W P-5'-P-CCNC: 27 U/L (ref 7–52)
ANION GAP SERPL CALCULATED.3IONS-SCNC: 7 MMOL/L (ref 4–13)
AST SERPL W P-5'-P-CCNC: 27 U/L (ref 13–39)
BILIRUB SERPL-MCNC: 0.59 MG/DL (ref 0.2–1)
BUN SERPL-MCNC: 25 MG/DL (ref 5–25)
CALCIUM SERPL-MCNC: 9.1 MG/DL (ref 8.4–10.2)
CHLORIDE SERPL-SCNC: 103 MMOL/L (ref 96–108)
CO2 SERPL-SCNC: 26 MMOL/L (ref 21–32)
CREAT SERPL-MCNC: 1.43 MG/DL (ref 0.6–1.3)
GFR SERPL CREATININE-BSD FRML MDRD: 48 ML/MIN/1.73SQ M
GLUCOSE P FAST SERPL-MCNC: 92 MG/DL (ref 65–99)
POTASSIUM SERPL-SCNC: 4.4 MMOL/L (ref 3.5–5.3)
PROT SERPL-MCNC: 6.9 G/DL (ref 6.4–8.4)
PSA SERPL-MCNC: 0.27 NG/ML (ref 0–4)
SODIUM SERPL-SCNC: 136 MMOL/L (ref 135–147)

## 2025-01-27 PROCEDURE — 36415 COLL VENOUS BLD VENIPUNCTURE: CPT

## 2025-01-27 PROCEDURE — 80053 COMPREHEN METABOLIC PANEL: CPT

## 2025-01-27 PROCEDURE — G0103 PSA SCREENING: HCPCS

## 2025-01-28 ENCOUNTER — RESULTS FOLLOW-UP (OUTPATIENT)
Dept: FAMILY MEDICINE CLINIC | Facility: CLINIC | Age: 75
End: 2025-01-28

## 2025-02-25 ENCOUNTER — TELEPHONE (OUTPATIENT)
Age: 75
End: 2025-02-25

## 2025-02-25 NOTE — TELEPHONE ENCOUNTER
Caller: Patient     Doctor: Dr. HUNTER    Reason for call: Pt called & stated that he has an appt on 2/27 with Dr. HUNTER but did not have the stress test that was needed for this appt due to a fall. Pt would like to know should he still keep the appt or reschedule it. Please call pt and advise..     Call back#: [phone]

## 2025-02-26 NOTE — TELEPHONE ENCOUNTER
As long as patient is doing otherwise okay, the appointment can be pushed to April or May and I can let you know when I can see him.    In the interim, if patient is unable to do the stress test on the treadmill, we can order a pharmacological nuclear stress test at some point in the near future.  Please let me know.

## 2025-02-27 NOTE — TELEPHONE ENCOUNTER
SPOKE TO PT & HE DOES NOT WANT TO DO THE CHEMICAL TEST; HE PREFERS THE REGULAR STRESS TEST  PT WILL CALL THE TESTING DEPT TO GET XAVIER FOR THE STRESS TEST  PT IS GOING TO [place] ON 4/25/25 TO SEE DR HUNTER

## 2025-03-24 ENCOUNTER — TELEPHONE (OUTPATIENT)
Dept: CARDIOLOGY CLINIC | Facility: CLINIC | Age: 75
End: 2025-03-24

## 2025-03-24 NOTE — TELEPHONE ENCOUNTER
Called and lvm for pt relaying  advised.    Referral for echo and stress test printed and faxed to testing

## 2025-03-24 NOTE — TELEPHONE ENCOUNTER
----- Message from Samira Broussard MD sent at 3/24/2025  9:35 AM EDT -----  Regarding: Cardiac testing and follow-up appointment.  This patient is supposed to get an echocardiogram as well as a stress test which was ordered in December.    Patient did not get the stress test as he had a fall.    If he has recovered from the fall, he should go ahead and get the echocardiogram and stress test done.  Order already in the system.    Once he has the tests done, we can decide on when the follow-up appointment should be.  Thank you.

## 2025-03-27 DIAGNOSIS — I48.3 TYPICAL ATRIAL FLUTTER (HCC): ICD-10-CM

## 2025-03-27 RX ORDER — METOPROLOL SUCCINATE 25 MG/1
25 TABLET, EXTENDED RELEASE ORAL DAILY
Qty: 90 TABLET | Refills: 1 | Status: SHIPPED | OUTPATIENT
Start: 2025-03-27

## 2025-04-04 ENCOUNTER — VBI (OUTPATIENT)
Dept: ADMINISTRATIVE | Facility: OTHER | Age: 75
End: 2025-04-04

## 2025-04-04 NOTE — TELEPHONE ENCOUNTER
04/04/25 10:06 AM     Chart reviewed for CRC: Colonoscopy was/were not submitted to the patient's insurance.     Violeta Venegas MA   [facility]

## 2025-05-05 ENCOUNTER — HOSPITAL ENCOUNTER (OUTPATIENT)
Dept: NON INVASIVE DIAGNOSTICS | Facility: CLINIC | Age: 75
Discharge: HOME | End: 2025-05-05
Payer: COMMERCIAL

## 2025-05-05 VITALS
HEIGHT: 73 IN | BODY MASS INDEX: 24.39 KG/M2 | HEART RATE: 68 BPM | WEIGHT: 184 LBS | DIASTOLIC BLOOD PRESSURE: 80 MMHG | SYSTOLIC BLOOD PRESSURE: 140 MMHG

## 2025-05-05 VITALS
OXYGEN SATURATION: 99 % | DIASTOLIC BLOOD PRESSURE: 74 MMHG | WEIGHT: 184 LBS | BODY MASS INDEX: 24.39 KG/M2 | HEART RATE: 57 BPM | SYSTOLIC BLOOD PRESSURE: 126 MMHG | HEIGHT: 73 IN

## 2025-05-05 DIAGNOSIS — R06.02 SHORTNESS OF BREATH: ICD-10-CM

## 2025-05-05 DIAGNOSIS — I48.92 ATRIAL FLUTTER, UNSPECIFIED TYPE (HCC): ICD-10-CM

## 2025-05-05 LAB
AORTIC ROOT: 3.9 CM
ASCENDING AORTA: 3.1 CM
BSA FOR ECHO PROCEDURE: 2.08 M2
DOP CALC LVOT AREA: 3.46 CM2
DOP CALC LVOT DIAMETER: 2.1 CM
E WAVE DECELERATION TIME: 210 MS
E/A RATIO: 0.93
FRACTIONAL SHORTENING: 38 (ref 28–44)
INTERVENTRICULAR SEPTUM IN DIASTOLE (PARASTERNAL SHORT AXIS VIEW): 1.2 CM
INTERVENTRICULAR SEPTUM: 1.2 CM (ref 0.6–1.1)
LAAS-AP2: 22.7 CM2
LAAS-AP4: 23.2 CM2
LEFT ATRIUM SIZE: 3.5 CM
LEFT ATRIUM VOLUME (MOD BIPLANE): 75 ML
LEFT ATRIUM VOLUME INDEX (MOD BIPLANE): 36.1 ML/M2
LEFT INTERNAL DIMENSION IN SYSTOLE: 2.8 CM (ref 2.1–4)
LEFT VENTRICULAR INTERNAL DIMENSION IN DIASTOLE: 4.5 CM (ref 3.5–6)
LEFT VENTRICULAR POSTERIOR WALL IN END DIASTOLE: 1.1 CM
LEFT VENTRICULAR STROKE VOLUME: 63 ML
LV EF US.2D.A4C+ESTIMATED: 76 %
LVSV (TEICH): 63 ML
MAX HR PERCENT: 85 %
MAX HR: 126 BPM
MV E'TISSUE VEL-LAT: 10 CM/S
MV E'TISSUE VEL-SEP: 8 CM/S
MV PEAK A VEL: 0.68 M/S
MV PEAK E VEL: 63 CM/S
RATE PRESSURE PRODUCT: NORMAL
RIGHT ATRIUM AREA SYSTOLE A4C: 18.5 CM2
RIGHT VENTRICLE ID DIMENSION: 2.8 CM
SINOTUBULAR JUNCTION: 2.8 CM
SL CV LEFT ATRIUM LENGTH A2C: 5.5 CM
SL CV LV EF: 55
SL CV LV EF: 60
SL CV PED ECHO LEFT VENTRICLE DIASTOLIC VOLUME (MOD BIPLANE) 2D: 93 ML
SL CV PED ECHO LEFT VENTRICLE SYSTOLIC VOLUME (MOD BIPLANE) 2D: 29 ML
SL CV SINUS OF VALSALVA 2D: 3.6 CM
SL CV STRESS RECOVERY BP: NORMAL MMHG
SL CV STRESS RECOVERY HR: 77 BPM
SL CV STRESS RECOVERY O2 SAT: 99 %
SL CV STRESS STAGE REACHED: 3
STJ: 2.8 CM
STRESS ANGINA INDEX: 0
STRESS BASELINE BP: NORMAL MMHG
STRESS BASELINE HR: 57 BPM
STRESS O2 SAT REST: 99 %
STRESS PEAK HR: 126 BPM
STRESS POST ESTIMATED WORKLOAD: 8.5 METS
STRESS POST EXERCISE DUR MIN: 6 MIN
STRESS POST EXERCISE DUR SEC: 30 SEC
STRESS POST O2 SAT PEAK: 98 %
STRESS POST PEAK BP: 192 MMHG
TR MAX PG: 27 MMHG
TR PEAK VELOCITY: 2.6 M/S
TRICUSPID ANNULAR PLANE SYSTOLIC EXCURSION: 1.8 CM
TRICUSPID VALVE PEAK REGURGITATION VELOCITY: 2.61 M/S

## 2025-05-05 PROCEDURE — 93350 STRESS TTE ONLY: CPT | Performed by: INTERNAL MEDICINE

## 2025-05-05 PROCEDURE — 93306 TTE W/DOPPLER COMPLETE: CPT | Performed by: INTERNAL MEDICINE

## 2025-05-05 PROCEDURE — 93306 TTE W/DOPPLER COMPLETE: CPT

## 2025-05-05 PROCEDURE — 93350 STRESS TTE ONLY: CPT

## 2025-05-06 LAB
CHEST PAIN STATEMENT: NORMAL
MAX DIASTOLIC BP: 58 MMHG
MAX PREDICTED HEART RATE: 147 BPM
PROTOCOL NAME: NORMAL
STRESS POST EXERCISE DUR MIN: 6 MIN
STRESS POST EXERCISE DUR SEC: 30 SEC
STRESS POST PEAK HR: 142 BPM
STRESS POST PEAK SYSTOLIC BP: 192 MMHG
TARGET HR FORMULA: NORMAL
TEST INDICATION: NORMAL

## 2025-05-07 ENCOUNTER — OFFICE VISIT (OUTPATIENT)
Dept: CARDIOLOGY CLINIC | Facility: CLINIC | Age: 75
End: 2025-05-07
Payer: COMMERCIAL

## 2025-05-07 VITALS
SYSTOLIC BLOOD PRESSURE: 142 MMHG | HEART RATE: 57 BPM | WEIGHT: 189 LBS | BODY MASS INDEX: 25.05 KG/M2 | DIASTOLIC BLOOD PRESSURE: 78 MMHG | HEIGHT: 73 IN | RESPIRATION RATE: 17 BRPM | OXYGEN SATURATION: 96 %

## 2025-05-07 DIAGNOSIS — I10 PRIMARY HYPERTENSION: ICD-10-CM

## 2025-05-07 DIAGNOSIS — Z79.01 CHRONIC ANTICOAGULATION: ICD-10-CM

## 2025-05-07 DIAGNOSIS — I48.92 ATRIAL FLUTTER, UNSPECIFIED TYPE (HCC): Primary | ICD-10-CM

## 2025-05-07 PROCEDURE — 99213 OFFICE O/P EST LOW 20 MIN: CPT | Performed by: INTERNAL MEDICINE

## 2025-05-07 NOTE — PROGRESS NOTES
[facility] [place]  [address]  [place] [address]  Cardiology Follow Up    Fabian Diaz  1950  [id number]    Assessment & Plan  Atrial flutter, unspecified type (HCC)  Patient understands risks and benefits of anticoagulation.  Patient denies any bleeding issues.  Continue rate control with beta-blockers.  Patient has felt much better rate with decrease in the incidence of palpitations with beta-blockers.    Possible future options including antiarrhythmics and A-fib ablation discussed with patient.      Primary hypertension  Importance of salt restriction reinforced.  Continue present medications which were reviewed.    Recent echocardiogram showed normal ejection fraction with no significant valvular abnormalities.  Patient also had a stress echocardiogram which was negative for ischemia.  Sensitivity and specificity as well as limitations of different modalities of cardiac imaging discussed at length with patient.      Chronic anticoagulation  Patient will continue Eliquis.  Patient to report any bleeding issues.    Follow-up in 1 year or earlier as needed.  Follow-up with primary care physician.  Patient is agreeable with the plan of care.         Chief Complaint   Patient presents with    Follow-up       Interval History:   Patient presents for follow-up visit.  Patient denies any history of chest pain shortness of breath.  Patient denies any history of leg edema or orthopnea PND.  No history of presyncope syncope.  Patient states compliance with the present list of medications.  Patient denies any bleeding issues.    Patient Active Problem List   Diagnosis    Essential hypertension    Traumatic complete tear of right rotator cuff    Erectile dysfunction due to arterial insufficiency    Stage 3a chronic kidney disease (HCC)    Typical atrial flutter (HCC)     Past Medical History:   Diagnosis Date    Hypertension     Rotator cuff tear, left     Rotator cuff tear,  right      Social History     Socioeconomic History    Marital status:      Spouse name: Not on file    Number of children: Not on file    Years of education: Not on file    Highest education level: Not on file   Occupational History    Not on file   Tobacco Use    Smoking status: Never    Smokeless tobacco: Never   Vaping Use    Vaping status: Never Used   Substance and Sexual Activity    Alcohol use: Yes     Comment: special occasions    Drug use: No    Sexual activity: Yes   Other Topics Concern    Not on file   Social History Narrative    Drinks coffee     Social Drivers of Health     Financial Resource Strain: Low Risk  (1/5/2024)    Overall Financial Resource Strain (CARDIA)     Difficulty of Paying Living Expenses: Not hard at all   Food Insecurity: No Food Insecurity (1/13/2025)    Hunger Vital Sign     Worried About Running Out of Food in the Last Year: Never true     Ran Out of Food in the Last Year: Never true   Transportation Needs: No Transportation Needs (1/13/2025)    PRAPARE - Transportation     Lack of Transportation (Medical): No     Lack of Transportation (Non-Medical): No   Physical Activity: Not on file   Stress: Not on file   Social Connections: Unknown (6/18/2024)    Received from [employer]    Social Therapeutic Monitoring Services     How often do you feel lonely or isolated from those around you? (Adult - for ages 18 years and over): Not on file   Intimate Partner Violence: Not on file   Housing Stability: Unknown (1/13/2025)    Housing Stability Vital Sign     Unable to Pay for Housing in the Last Year: No     Number of Times Moved in the Last Year: Not on file     Homeless in the Last Year: No      Family History   Problem Relation Age of Onset    Diabetes Mother     Cancer Other      Past Surgical History:   Procedure Laterality Date    BASAL CELL CARCINOMA EXCISION      DENTAL SURGERY      HIP SURGERY Right 02/13/2019    JOINT REPLACEMENT  12/12/2022    MOHS SURGERY  03/26/2019    on nose with skin  graft from left ear    WISDOM TOOTH EXTRACTION      WRIST SURGERY Right     Tendon and muscle repair       Current Outpatient Medications:     amLODIPine (NORVASC) 5 mg tablet, Take 1 tablet (5 mg total) by mouth daily, Disp: 90 tablet, Rfl: 3    apixaban (Eliquis) 5 mg, Take 1 tablet (5 mg total) by mouth 2 (two) times a day, Disp: 180 tablet, Rfl: 3    metoprolol succinate (TOPROL-XL) 25 mg 24 hr tablet, TAKE 1 TABLET BY MOUTH DAILY., Disp: 90 tablet, Rfl: 1    Multiple Vitamin (multivitamin) tablet, Take 1 tablet by mouth daily, Disp: , Rfl:     tadalafil (CIALIS) 20 MG tablet, Take 1 tablet (20 mg total) by mouth daily as needed for erectile dysfunction, Disp: 30 tablet, Rfl: 0    traMADol (ULTRAM) 50 mg tablet, TAKE 1 TABLET BY MOUTH 3 TIMES DAILY AS NEEDED FOR PAIN (Patient not taking: Reported on 5/7/2025), Disp: , Rfl:   Allergies   Allergen Reactions    Tetracyclines & Related Anaphylaxis     Sores appeared in mouth and genitals    Demerol [Meperidine] Vomiting       Labs:  Hospital Outpatient Visit on 05/05/2025   Component Date Value    Baseline HR 05/05/2025 57     Baseline BP 05/05/2025 126/74     O2 sat rest 05/05/2025 99     Stress peak HR 05/05/2025 126     Post peak BP 05/05/2025 192     O2 sat peak 05/05/2025 98     Recovery HR 05/05/2025 77     Recovery BP 05/05/2025 140/70     O2 sat recovery 05/05/2025 99     Max HR 05/05/2025 126     Max HR Percent 05/05/2025 85     Exercise duration (min) 05/05/2025 6     Exercise duration (sec) 05/05/2025 30     Estimated workload 05/05/2025 8.5     Rate Pressure Product 05/05/2025 24,192.0     Angina Index 05/05/2025 0     Stress Stage Reached 05/05/2025 3.0     LV EF 05/05/2025 55     Protocol Name 05/05/2025 EUGENIO     Exercise duration (min) 05/05/2025 6     Exercise duration (sec) 05/05/2025 30     Post Peak Systolic BP 05/05/2025 192     Max Diastolic Bp 05/05/2025 58     Peak HR 05/05/2025 142     Max Predicted Heart Rate 05/05/2025 147     Reason for  Termination 05/05/2025                      Value:Target Heart Rate Achieved  Maximal exercise threshold      Test Indication 05/05/2025 DYSPNEA, SOB     Target Hr Formular 05/05/2025 (220 - Age)*85%     Chest Pain Statement 05/05/2025 none    Hospital Outpatient Visit on 05/05/2025   Component Date Value    Triscuspid Valve Regurgi* 05/05/2025 27.0     Sinus of Valsalva, 2D 05/05/2025 3.6     RAA A4C 05/05/2025 18.5     LA Volume Index (BP) 05/05/2025 36.1     MV Peak A Jeramy 05/05/2025 0.68     MV Peak E Jeramy 05/05/2025 63     LVOT diameter 05/05/2025 2.1     E wave deceleration time 05/05/2025 210     E/A ratio 05/05/2025 0.93     TR Peak Jeramy 05/05/2025 2.6     RVID d 05/05/2025 2.8     A4C EF 05/05/2025 76     Tricuspid valve peak reg* 05/05/2025 2.61     Left ventricular stroke * 05/05/2025 63.00     IVSd 05/05/2025 1.20     Tricuspid annular plane * 05/05/2025 1.80     Ao root 05/05/2025 3.90     Ao STJ 05/05/2025 2.80     LVPWd 05/05/2025 1.10     LA size 05/05/2025 3.5     Asc Ao 05/05/2025 3.1     STJ 05/05/2025 2.8     LA volume (BP) 05/05/2025 75     FS 05/05/2025 38     LVIDS 05/05/2025 2.80     IVS 05/05/2025 1.2     LVIDd 05/05/2025 4.50     LA length (A2C) 05/05/2025 5.50     LEFT VENTRICLE SYSTOLIC * 05/05/2025 29     LV DIASTOLIC VOLUME (MOD* 05/05/2025 93     Left Atrium Area-systoli* 05/05/2025 23.2     Left Atrium Area-systoli* 05/05/2025 22.7     MV E' Tissue Velocity La* 05/05/2025 10     MV E' Tissue Velocity Se* 05/05/2025 8     LVSV, 2D 05/05/2025 63     BSA 05/05/2025 2.08     LVOT area 05/05/2025 3.46     LV EF 05/05/2025 60      Imaging: Stress strip  Result Date: 5/6/2025  Narrative: Confirmed by KODY SEAY ([id number]),  Alicia Ballard ([id number]) on 5/6/2025 9:24:43 AM    Echo stress test, exercise  Result Date: 5/5/2025  Narrative:   Stress ECG: The ECG was not diagnostic due to resting ST-T abnormalities.   Left Ventricle: Left ventricular cavity size is normal. The left ventricular  "ejection fraction is 55%. Systolic function is normal. Wall motion is normal.   Peak Stress Echo: Left ventricle cavity has normal reduction in size at peak stress. The left ventricle systolic function is hyperdynamic at peak stress. The peak stress echo showed normal wall motion which was improved compared to baseline.     Echo complete w/ contrast if indicated  Result Date: 5/5/2025  Narrative:   Left Ventricle: Left ventricular cavity size is normal. Wall thickness is normal. The left ventricular ejection fraction is 60%. Systolic function is normal. Wall motion is normal.   Mitral Valve: There is mild annular calcification.   Tricuspid Valve: There is mild regurgitation.   Aorta: The aortic root is mildly dilated. 3.9cm.       Review of Systems:  Review of Systems  REVIEW OF SYSTEMS:  Constitutional:  Denies fever or chills   Eyes:  Denies change in visual acuity   HENT:  Denies nasal congestion or sore throat   Respiratory:  Denies cough or shortness of breath   Cardiovascular:  Denies chest pain or edema   GI:  Denies abdominal pain, nausea, vomiting, bloody stools or diarrhea   :  Denies dysuria, frequency, difficulty in micturition and nocturia  Musculoskeletal:  Denies back pain or joint pain   Neurologic:  Denies headache, focal weakness or sensory changes   Endocrine:  Denies polyuria or polydipsia   Lymphatic:  Denies swollen glands   Psychiatric:  Denies depression or anxiety    Physical Exam:    /78 (BP Location: Left arm, Patient Position: Sitting, Cuff Size: Standard)   Pulse 57   Resp 17   Ht 6' 1\" (1.854 m)   Wt 85.7 kg (189 lb)   SpO2 96%   BMI 24.94 kg/m²     Physical Exam  PHYSICAL EXAM:  General:  Patient is not in acute distress   Head: Normocephalic, Atraumatic.  HEENT:  Both pupils normal-size atraumatic, normocephalic, nonicteric  Neck:  JVP not raised. Trachea central. No carotid bruit  Respiratory:  normal breath sounds no crackles. no rhonchi  Cardiovascular:  Regular rate " and rhythm no S3 no murmurs  GI:  Abdomen soft nontender. No organomegaly.   Lymphatic:  No cervical or inguinal lymphadenopathy  Neurologic:  Patient is awake alert, oriented . Grossly nonfocal  Extremities no edema

## 2025-05-28 ENCOUNTER — TELEPHONE (OUTPATIENT)
Age: 75
End: 2025-05-28

## 2025-05-28 NOTE — TELEPHONE ENCOUNTER
Patient injured his right ribs in a work comp accident in 2024.  He has multiple rib fractures.  The panel of doctors he is seeing now recommend he sees a trauma doctor because they think cartilage is damaged.  The only one he can find is through [facility].  The problem with going with them is they will not see him without a CT scan of his ribs.    Patient believes workman's comp is giving him the run around at this point.  He is scheduled for an LESLIE on June 26.     Patient would like to speak to provider.  He asked if provider could give him a call if not, patient is willing to come in for an appointment.    Please advise, thank you.

## 2025-06-04 ENCOUNTER — APPOINTMENT (OUTPATIENT)
Dept: RADIOLOGY | Facility: CLINIC | Age: 75
End: 2025-06-04
Payer: COMMERCIAL

## 2025-06-04 ENCOUNTER — OFFICE VISIT (OUTPATIENT)
Dept: FAMILY MEDICINE CLINIC | Facility: CLINIC | Age: 75
End: 2025-06-04
Payer: COMMERCIAL

## 2025-06-04 VITALS
DIASTOLIC BLOOD PRESSURE: 72 MMHG | HEART RATE: 88 BPM | HEIGHT: 73 IN | BODY MASS INDEX: 24.92 KG/M2 | OXYGEN SATURATION: 97 % | WEIGHT: 188 LBS | RESPIRATION RATE: 18 BRPM | SYSTOLIC BLOOD PRESSURE: 124 MMHG

## 2025-06-04 DIAGNOSIS — R07.89 LEFT-SIDED CHEST WALL PAIN: Primary | ICD-10-CM

## 2025-06-04 DIAGNOSIS — R07.89 LEFT-SIDED CHEST WALL PAIN: ICD-10-CM

## 2025-06-04 PROCEDURE — 99213 OFFICE O/P EST LOW 20 MIN: CPT | Performed by: FAMILY MEDICINE

## 2025-06-04 PROCEDURE — 71101 X-RAY EXAM UNILAT RIBS/CHEST: CPT

## 2025-06-04 PROCEDURE — G2211 COMPLEX E/M VISIT ADD ON: HCPCS | Performed by: FAMILY MEDICINE

## 2025-06-04 RX ORDER — TRAMADOL HYDROCHLORIDE 50 MG/1
TABLET ORAL
COMMUNITY
Start: 2025-05-12

## 2025-06-04 NOTE — PROGRESS NOTES
"Name: Fabian Diaz      : 1950      MRN: [id number]  Encounter Provider: Johnie Alvarez DO  Encounter Date: 2025   Encounter department: [facility] [address]  :  Assessment & Plan  Left-sided chest wall pain    Orders:  •  XR ribs 2 vw left; Future      Assessment & Plan           History of Present Illness   Patient comes in today complaining of left chest wall pain.  He was diagnosed with a left rib fracture back in December and does not seem to be getting any better.  He states it hurts when he moves in certain directions especially twisting as well as bending over.  He states that is very difficult to push or pull items especially mowing the lawn.      Review of Systems   Constitutional:  Negative for chills, fatigue and fever.   HENT:  Negative for congestion, ear pain, hearing loss, postnasal drip, rhinorrhea and sore throat.    Eyes:  Negative for pain and visual disturbance.   Respiratory:  Negative for chest tightness, shortness of breath and wheezing.    Cardiovascular:  Positive for chest pain. Negative for leg swelling.   Gastrointestinal:  Negative for abdominal distention, abdominal pain, constipation, diarrhea and vomiting.   Endocrine: Negative for cold intolerance and heat intolerance.   Genitourinary:  Negative for difficulty urinating, frequency and urgency.   Musculoskeletal:  Negative for arthralgias and gait problem.   Skin:  Negative for color change.   Neurological:  Negative for dizziness, tremors, syncope, numbness and headaches.   Hematological:  Negative for adenopathy.   Psychiatric/Behavioral:  Negative for agitation, confusion and sleep disturbance. The patient is not nervous/anxious.        Objective   /72 (BP Location: Left arm, Patient Position: Sitting, Cuff Size: Standard)   Pulse 88   Resp 18   Ht 6' 1\" (1.854 m)   Wt 85.3 kg (188 lb)   SpO2 97%   BMI 24.80 kg/m²      Physical Exam  Constitutional:       " Appearance: He is well-developed.   HENT:      Head: Normocephalic.      Right Ear: External ear normal.      Left Ear: External ear normal.      Nose: Nose normal.     Eyes:      Extraocular Movements: Extraocular movements intact.      Conjunctiva/sclera: Conjunctivae normal.      Pupils: Pupils are equal, round, and reactive to light.     Neck:      Thyroid: No thyromegaly.     Cardiovascular:      Rate and Rhythm: Normal rate and regular rhythm.      Heart sounds: Normal heart sounds.   Pulmonary:      Effort: Pulmonary effort is normal.      Breath sounds: Normal breath sounds.   Abdominal:      General: Bowel sounds are normal.      Palpations: Abdomen is soft.     Musculoskeletal:         General: Tenderness (Left lateral chest wall) present. Normal range of motion.      Cervical back: Normal range of motion.     Skin:     General: Skin is warm and dry.     Neurological:      Mental Status: He is alert and oriented to person, place, and time.     Psychiatric:         Mood and Affect: Mood normal.         Behavior: Behavior normal.

## 2025-06-09 ENCOUNTER — TELEPHONE (OUTPATIENT)
Age: 75
End: 2025-06-09

## 2025-06-09 NOTE — TELEPHONE ENCOUNTER
Patient calling in for xray results from last week.  He would like a call back as soon as possible from pcp/nurse about them.

## 2025-06-10 DIAGNOSIS — I10 ESSENTIAL HYPERTENSION: ICD-10-CM

## 2025-06-10 RX ORDER — AMLODIPINE BESYLATE 5 MG/1
5 TABLET ORAL DAILY
Qty: 90 TABLET | Refills: 1 | Status: SHIPPED | OUTPATIENT
Start: 2025-06-10

## 2025-06-10 NOTE — TELEPHONE ENCOUNTER
Patient calling to follow up on x ray results. Requested a call back.     Please advise, thank you.

## 2025-06-11 NOTE — TELEPHONE ENCOUNTER
Phone call from patient calling to check if his results of his xray have come back yet. I did let him know they are still pending and not final yet. I let him know once received DR Alvarez will review and call patient. Patient verbally understands and will await the call.

## 2025-06-12 ENCOUNTER — RESULTS FOLLOW-UP (OUTPATIENT)
Dept: FAMILY MEDICINE CLINIC | Facility: CLINIC | Age: 75
End: 2025-06-12

## 2025-08-06 ENCOUNTER — OFFICE VISIT (OUTPATIENT)
Dept: FAMILY MEDICINE CLINIC | Facility: CLINIC | Age: 75
End: 2025-08-06
Payer: COMMERCIAL

## 2025-08-06 VITALS
HEIGHT: 73 IN | OXYGEN SATURATION: 96 % | DIASTOLIC BLOOD PRESSURE: 72 MMHG | BODY MASS INDEX: 24.52 KG/M2 | SYSTOLIC BLOOD PRESSURE: 116 MMHG | WEIGHT: 185 LBS | TEMPERATURE: 96.6 F | HEART RATE: 65 BPM

## 2025-08-06 DIAGNOSIS — M25.522 LEFT ELBOW PAIN: ICD-10-CM

## 2025-08-06 DIAGNOSIS — R07.89 LEFT-SIDED CHEST WALL PAIN: ICD-10-CM

## 2025-08-06 DIAGNOSIS — K21.9 GASTROESOPHAGEAL REFLUX DISEASE WITHOUT ESOPHAGITIS: Primary | ICD-10-CM

## 2025-08-06 PROCEDURE — G2211 COMPLEX E/M VISIT ADD ON: HCPCS | Performed by: FAMILY MEDICINE

## 2025-08-06 PROCEDURE — 99213 OFFICE O/P EST LOW 20 MIN: CPT | Performed by: FAMILY MEDICINE

## 2025-08-06 RX ORDER — LIDOCAINE 50 MG/G
1 PATCH TOPICAL DAILY
COMMUNITY
Start: 2025-07-24

## 2025-08-06 RX ORDER — PREGABALIN 50 MG/1
50 CAPSULE ORAL 3 TIMES DAILY
COMMUNITY
Start: 2025-07-24

## 2025-08-06 RX ORDER — PANTOPRAZOLE SODIUM 40 MG/1
40 TABLET, DELAYED RELEASE ORAL
Qty: 30 TABLET | Refills: 1 | Status: SHIPPED | OUTPATIENT
Start: 2025-08-06 | End: 2026-02-02